# Patient Record
Sex: MALE | Race: WHITE | NOT HISPANIC OR LATINO | Employment: UNEMPLOYED | URBAN - NONMETROPOLITAN AREA
[De-identification: names, ages, dates, MRNs, and addresses within clinical notes are randomized per-mention and may not be internally consistent; named-entity substitution may affect disease eponyms.]

---

## 2020-03-16 ENCOUNTER — HISTORICAL (OUTPATIENT)
Dept: ADMINISTRATIVE | Facility: HOSPITAL | Age: 60
End: 2020-03-16

## 2020-04-21 ENCOUNTER — HISTORICAL (OUTPATIENT)
Dept: CARDIOLOGY | Facility: HOSPITAL | Age: 60
End: 2020-04-21

## 2021-01-20 ENCOUNTER — HISTORICAL (OUTPATIENT)
Dept: ADMINISTRATIVE | Facility: HOSPITAL | Age: 61
End: 2021-01-20

## 2021-03-04 ENCOUNTER — HISTORICAL (OUTPATIENT)
Dept: ADMINISTRATIVE | Facility: HOSPITAL | Age: 61
End: 2021-03-04

## 2021-03-15 ENCOUNTER — HISTORICAL (OUTPATIENT)
Dept: ADMINISTRATIVE | Facility: HOSPITAL | Age: 61
End: 2021-03-15

## 2022-01-02 ENCOUNTER — HISTORICAL (OUTPATIENT)
Dept: ADMINISTRATIVE | Facility: HOSPITAL | Age: 62
End: 2022-01-02

## 2022-01-21 ENCOUNTER — TELEPHONE (OUTPATIENT)
Dept: ORTHOPEDICS | Facility: CLINIC | Age: 62
End: 2022-01-21
Payer: MEDICAID

## 2022-01-21 DIAGNOSIS — M16.12 PRIMARY OSTEOARTHRITIS OF LEFT HIP: Primary | ICD-10-CM

## 2022-03-02 ENCOUNTER — TELEPHONE (OUTPATIENT)
Dept: ORTHOPEDICS | Facility: CLINIC | Age: 62
End: 2022-03-02
Payer: MEDICAID

## 2022-03-02 NOTE — TELEPHONE ENCOUNTER
----- Message from Lauren Temple sent at 3/2/2022  8:59 AM CST -----  Type:  Patient Call Back    Who Called: Dereje     What is the reqeust in detail: Pt IS requesting a call back to reschedule is appt to a morning appt, no solution found in the template. Please Advise     Can the clinic reply by [facility]?    Best Call Back [phone]

## 2022-03-08 ENCOUNTER — TELEPHONE (OUTPATIENT)
Dept: ORTHOPEDICS | Facility: CLINIC | Age: 62
End: 2022-03-08
Payer: MEDICAID

## 2022-03-08 NOTE — TELEPHONE ENCOUNTER
----- Message from Meagan Padilla sent at 3/8/2022  4:43 PM CST -----  Regarding: advice  Contact: [phone]  Pt is calling to speak with someone in office needing information about appt. Please contact pt

## 2022-03-08 NOTE — TELEPHONE ENCOUNTER
Spoke with Mr. Aguirre, pt has canceled appointment for now- states he will reach out to his insurance company for a closer facility.

## 2022-06-23 DIAGNOSIS — G56.00 CARPAL TUNNEL SYNDROME, UNSPECIFIED LATERALITY: Primary | ICD-10-CM

## 2022-07-12 ENCOUNTER — OFFICE VISIT (OUTPATIENT)
Dept: ORTHOPEDICS | Facility: CLINIC | Age: 62
End: 2022-07-12
Payer: MEDICAID

## 2022-07-12 ENCOUNTER — TELEPHONE (OUTPATIENT)
Dept: ORTHOPEDICS | Facility: CLINIC | Age: 62
End: 2022-07-12

## 2022-07-12 VITALS — WEIGHT: 257.81 LBS | BODY MASS INDEX: 36.09 KG/M2 | HEIGHT: 71 IN

## 2022-07-12 DIAGNOSIS — M16.12 PRIMARY OSTEOARTHRITIS OF LEFT HIP: Primary | ICD-10-CM

## 2022-07-12 DIAGNOSIS — Z41.9 ELECTIVE SURGERY: ICD-10-CM

## 2022-07-12 DIAGNOSIS — Z96.642 S/P TOTAL LEFT HIP ARTHROPLASTY: Primary | ICD-10-CM

## 2022-07-12 PROCEDURE — 1159F PR MEDICATION LIST DOCUMENTED IN MEDICAL RECORD: ICD-10-PCS | Mod: CPTII,,, | Performed by: ORTHOPAEDIC SURGERY

## 2022-07-12 PROCEDURE — 99213 OFFICE O/P EST LOW 20 MIN: CPT | Mod: PBBFAC,PN | Performed by: ORTHOPAEDIC SURGERY

## 2022-07-12 PROCEDURE — 99999 PR PBB SHADOW E&M-EST. PATIENT-LVL III: CPT | Mod: PBBFAC,,, | Performed by: ORTHOPAEDIC SURGERY

## 2022-07-12 PROCEDURE — 4010F PR ACE/ARB THEARPY RXD/TAKEN: ICD-10-PCS | Mod: CPTII,,, | Performed by: ORTHOPAEDIC SURGERY

## 2022-07-12 PROCEDURE — 1159F MED LIST DOCD IN RCRD: CPT | Mod: CPTII,,, | Performed by: ORTHOPAEDIC SURGERY

## 2022-07-12 PROCEDURE — 4010F ACE/ARB THERAPY RXD/TAKEN: CPT | Mod: CPTII,,, | Performed by: ORTHOPAEDIC SURGERY

## 2022-07-12 PROCEDURE — 99999 PR PBB SHADOW E&M-EST. PATIENT-LVL III: ICD-10-PCS | Mod: PBBFAC,,, | Performed by: ORTHOPAEDIC SURGERY

## 2022-07-12 PROCEDURE — 3008F PR BODY MASS INDEX (BMI) DOCUMENTED: ICD-10-PCS | Mod: CPTII,,, | Performed by: ORTHOPAEDIC SURGERY

## 2022-07-12 PROCEDURE — 3008F BODY MASS INDEX DOCD: CPT | Mod: CPTII,,, | Performed by: ORTHOPAEDIC SURGERY

## 2022-07-12 PROCEDURE — 1160F PR REVIEW ALL MEDS BY PRESCRIBER/CLIN PHARMACIST DOCUMENTED: ICD-10-PCS | Mod: CPTII,,, | Performed by: ORTHOPAEDIC SURGERY

## 2022-07-12 PROCEDURE — 1160F RVW MEDS BY RX/DR IN RCRD: CPT | Mod: CPTII,,, | Performed by: ORTHOPAEDIC SURGERY

## 2022-07-12 PROCEDURE — 99204 OFFICE O/P NEW MOD 45 MIN: CPT | Mod: S$PBB,,, | Performed by: ORTHOPAEDIC SURGERY

## 2022-07-12 PROCEDURE — 99204 PR OFFICE/OUTPT VISIT, NEW, LEVL IV, 45-59 MIN: ICD-10-PCS | Mod: S$PBB,,, | Performed by: ORTHOPAEDIC SURGERY

## 2022-07-12 RX ORDER — EZETIMIBE 10 MG/1
10 TABLET ORAL DAILY
COMMUNITY
Start: 2022-05-11

## 2022-07-12 RX ORDER — METOPROLOL TARTRATE 100 MG/1
100 TABLET ORAL DAILY
COMMUNITY
Start: 2022-06-14

## 2022-07-12 RX ORDER — ROSUVASTATIN CALCIUM 40 MG/1
TABLET, COATED ORAL
COMMUNITY
Start: 2022-05-11

## 2022-07-12 RX ORDER — BENAZEPRIL HYDROCHLORIDE 40 MG/1
40 TABLET ORAL DAILY
COMMUNITY
Start: 2022-06-14

## 2022-07-12 RX ORDER — ASPIRIN 81 MG/1
81 TABLET ORAL DAILY
COMMUNITY

## 2022-07-12 RX ORDER — IBUPROFEN 800 MG/1
800 TABLET ORAL EVERY 8 HOURS PRN
COMMUNITY
Start: 2022-06-29

## 2022-07-12 NOTE — PROGRESS NOTES
Subjective:      Patient ID: Dereje Aguirre is a 62 y.o. male.    Chief Complaint: Pain of the Left Knee, Consult, and Hip Pain (LEFT )    HPI      Dereje Aguirre is seen for evaluation and treatment of hip pain.  They have experienced problems with their left hip over the past several years Pain is located in the groin and  referred to the knee. They have been treated with over the counter analgesics, NSAIDS and activity modification.   Symptoms have recently worsened. Ambulation reportedly has been impaired. Self care ADLs are painful.  Patient has been unable to work due to hip pain    Review of Systems   Constitutional: Negative for fever and weight loss.   HENT: Negative for congestion.    Eyes: Negative for visual disturbance.   Cardiovascular: Negative for chest pain.   Respiratory: Negative for shortness of breath.    Hematologic/Lymphatic: Negative for bleeding problem. Does not bruise/bleed easily.   Skin: Negative for poor wound healing.   Musculoskeletal: Positive for joint pain.   Gastrointestinal: Negative for abdominal pain.   Genitourinary: Negative for dysuria.   Neurological: Negative for seizures.   Psychiatric/Behavioral: Negative for altered mental status.   Allergic/Immunologic: Negative for persistent infections.         Objective:            Ortho/SPM Exam  Left hip    The patient is not in acute distress.   Body habitus is:normal.   Sclerae normal  The patient walks with a limp.   Respiratory distress:  none  The skin over the hip is:intact.   There is:no local tenderness.   Range of motion- Flexion eighty, External rotation 35, internal rotation -10.  Resisted SLR positive.  Pain with rotation positive  Sciatic tension findings negative.  Shortening/lengthening compared to the contralateral side exam deferred.  Pulses DP present, PT present.  Motor normal 5/5 strength in all tested muscle groups.   Sensory normal.    I reviewed the relevant imaging for the patient's condition:  Left hip  films show complete loss of superior joint space with advanced secondary degenerative changes          Assessment:       Encounter Diagnosis   Name Primary?    Primary osteoarthritis of left hip Yes    The condition is radiographically advanced with significant pain and functional impairment.  Nonsurgical measures have not control symptoms      Plan:       Dereje was seen today for consult, hip pain and pain.    Diagnoses and all orders for this visit:    Primary osteoarthritis of left hip    I explained my diagnostic impression and the reasoning behind it in detail, using layman's terms.  Models and/or pictures were used to help in the explanation.     The process of left total hip replacement was explained to the patient.  The nature of the procedure was explained using a model.  The expected perioperative clinical course and period of recovery as applicable to the patient's condition was described.  The risks including death, infection, thromboembolic events, instability, leg length discrepancy, persistent pain/stiffness, fracture around implant and implant failure due to wear or loosening, with possible need for reoperation, were all explained.  The possibility and expectations of continued nonsurgical care were reviewed.  The patient understands and wishes to proceed with the recommended operation.

## 2022-07-15 DIAGNOSIS — M16.12 PRIMARY OSTEOARTHRITIS OF LEFT HIP: Primary | ICD-10-CM

## 2022-07-15 RX ORDER — MUPIROCIN 20 MG/G
OINTMENT TOPICAL
Status: CANCELLED | OUTPATIENT
Start: 2022-07-15

## 2022-07-15 RX ORDER — TRANEXAMIC ACID 100 MG/ML
1000 INJECTION, SOLUTION INTRAVENOUS
Status: CANCELLED | OUTPATIENT
Start: 2022-07-15

## 2022-07-15 RX ORDER — NAPROXEN 500 MG/1
500 TABLET ORAL
Status: CANCELLED | OUTPATIENT
Start: 2022-07-15 | End: 2022-07-15

## 2022-07-15 RX ORDER — ACETAMINOPHEN 500 MG
1000 TABLET ORAL
Status: CANCELLED | OUTPATIENT
Start: 2022-07-15 | End: 2022-07-15

## 2022-07-15 RX ORDER — CEFAZOLIN SODIUM 2 G/50ML
2 SOLUTION INTRAVENOUS
Status: CANCELLED | OUTPATIENT
Start: 2022-07-15

## 2022-07-15 RX ORDER — SODIUM CHLORIDE 0.9 % (FLUSH) 0.9 %
3 SYRINGE (ML) INJECTION EVERY 8 HOURS
Status: CANCELLED | OUTPATIENT
Start: 2022-07-15

## 2022-07-15 RX ORDER — PREGABALIN 75 MG/1
150 CAPSULE ORAL
Status: CANCELLED | OUTPATIENT
Start: 2022-07-15 | End: 2022-07-15

## 2022-09-23 DIAGNOSIS — Z01.818 PREOP TESTING: ICD-10-CM

## 2022-09-23 DIAGNOSIS — M16.12 PRIMARY OSTEOARTHRITIS OF LEFT HIP: Primary | ICD-10-CM

## 2022-09-23 NOTE — PROGRESS NOTES
PATIENT OPTIMIZATION VISIT:     CC: Left hip pain    Dereje Aguirre is a 62 y.o. male here today for a pre-operative visit in preparation for a Left total hip arthroplasty to be performed by  Dr. Leon on 10/17/22.      Dereje Aguirre has a chronic history of Left hip pain.  Pain is worse with activity and weight bearing. Patient has experienced interference of activities of daily living due to increased pain and decreased range of motion. Patient has failed non-operative treatment including NSAIDs, as well as greater than 3 months of activity modification.     he was last seen and treated in the clinic on 7/12/2022. he will be medically optimized by the pre op center. There has been no significant change in medical status since last visit. No fever, chills, malaise, or unexplained weight change.     He plans to stay with his daughter in [place] after the surgery. He lives in [place].     History of HTN, high cholesterol.     PCP: Andrey Vidales MD.     Patient is NOT vaccinated against covid. He thinks he had covid in January of 2022.     History reviewed. No pertinent past medical history.    History reviewed. No pertinent surgical history.    History reviewed. No pertinent family history.    Review of patient's allergies indicates:  No Known Allergies      Current Outpatient Medications:     amLODIPine (NORVASC) 5 MG tablet, Take 5 mg by mouth once daily., Disp: , Rfl:     aspirin (ECOTRIN) 81 MG EC tablet, Take 81 mg by mouth once daily., Disp: , Rfl:     benazepriL (LOTENSIN) 40 MG tablet, Take 40 mg by mouth once daily., Disp: , Rfl:     diphenhydramine HCl (BENADRYL ALLERGY ORAL), Take by mouth., Disp: , Rfl:     ezetimibe (ZETIA) 10 mg tablet, Take 10 mg by mouth once daily., Disp: , Rfl:      mg tablet, Take 800 mg by mouth every 8 (eight) hours as needed., Disp: , Rfl:     metoprolol tartrate (LOPRESSOR) 100 MG tablet, Take 100 mg by mouth., Disp: , Rfl:     rosuvastatin (CRESTOR) 40 MG Tab,  "SMARTSI Tablet(s) By Mouth Every Evening, Disp: , Rfl:     Review of Systems:   Constitutional: no fever or chills  Eyes: no visual changes  ENT: no nasal congestion or sore throat  Respiratory: no cough or shortness of breath  Cardiovascular: no chest pain or palpitations  Gastrointestinal: no nausea or vomiting, tolerating diet  Genitourinary: no hematuria or dysuria  Integument/Breast: no rash or pruritis  Hematologic/Lymphatic: no easy bruising or lymphadenopathy  Musculoskeletal: positive for hip pain  Neurological: no seizures or tremors  Behavioral/Psych: no auditory or visual hallucinations  Endocrine: no heat or cold intolerance    PE:  Ht 5' 11" (1.803 m)   Wt 118.9 kg (262 lb 1.6 oz)   BMI 36.56 kg/m²     Constitutional: Oriented to person, place, and time. Well-developed and well-nourished.   HENT:   Head: Normocephalic and atraumatic.   Cardiovascular: Normal rate and regular rhythm.   Pulmonary/Chest: Effort normal and breath sounds normal.   Abdominal: Soft.   Neurological: He is alert and oriented to person, place, and time.   Skin: Skin is warm and dry. Capillary refill takes less than 2 seconds.   Psychiatric: Normal mood and affect with normal behavior.     Body habitus is:normal.   The patient walks with a limp.     Left hip exam:   The skin over the hip is:intact.   There is:no local tenderness.   Range of motion- Flexion eighty, External rotation 35, internal rotation -10.    Resisted SLR positive.  Pain with rotation positive  Sciatic tension findings negative.  Shortening/lengthening compared to the contralateral side exam deferred.    Motor normal 5/5 strength in all tested muscle groups.   Sensory normal.     Radiographs: Radiographs reveal complete loss of superior joint space with advanced secondary degenerative changes    Diagnosis: osteoarthritis Left hip    Plan: Left total hip arthroplasty on 10/17/22.    Pre-op labs to be done including CBC with diff and CMP.     Patient will be " contacted by [facility] and by anesthesia for pre-op visits. Patient will be screened for covid prior to surgery- he lives 3 [place] away. May need to do rapid test day of surgery. Dr. Leon's staff will call him and let him know.     Patient plans to go home same day of surgery if possible.     Postop DME ordered including RW    Postop HHPT ordered as well.     During today's Patient Optimization Visit all medications and imaging were reviewed. If completed, all consultant notes, EKG, and lab findings were reviewed. Any additional testing or consultts needed for medical clearance were ordered today. Pre, kim, and post operative procedures and expectations discussed.  Discharge planning was discussed and Home Health has been ordered (when applicable).  All questions were answered.      I spent a total of 30 minutes completing this on the day of the visit.This includes face to face time and non-face to face time preparing to see the patient (eg, review of tests), obtaining and/or reviewing separately obtained history, documenting clinical information in the electronic or other health record, independently interpreting results and communicating results to the patient/family/caregiver, or care coordinator.    Patient has 2 week postop scheduled with Dr. Leon on 11/1/22.

## 2022-09-27 ENCOUNTER — OFFICE VISIT (OUTPATIENT)
Dept: ORTHOPEDICS | Facility: CLINIC | Age: 62
End: 2022-09-27
Payer: MEDICAID

## 2022-09-27 VITALS — HEIGHT: 71 IN | WEIGHT: 262.13 LBS | BODY MASS INDEX: 36.7 KG/M2

## 2022-09-27 DIAGNOSIS — M16.12 PRIMARY OSTEOARTHRITIS OF LEFT HIP: Primary | ICD-10-CM

## 2022-09-27 PROCEDURE — 1160F PR REVIEW ALL MEDS BY PRESCRIBER/CLIN PHARMACIST DOCUMENTED: ICD-10-PCS | Mod: CPTII,,, | Performed by: PHYSICIAN ASSISTANT

## 2022-09-27 PROCEDURE — 99214 PR OFFICE/OUTPT VISIT, EST, LEVL IV, 30-39 MIN: ICD-10-PCS | Mod: S$PBB,,, | Performed by: PHYSICIAN ASSISTANT

## 2022-09-27 PROCEDURE — 1159F MED LIST DOCD IN RCRD: CPT | Mod: CPTII,,, | Performed by: PHYSICIAN ASSISTANT

## 2022-09-27 PROCEDURE — 99213 OFFICE O/P EST LOW 20 MIN: CPT | Mod: PBBFAC,PN | Performed by: PHYSICIAN ASSISTANT

## 2022-09-27 PROCEDURE — 1159F PR MEDICATION LIST DOCUMENTED IN MEDICAL RECORD: ICD-10-PCS | Mod: CPTII,,, | Performed by: PHYSICIAN ASSISTANT

## 2022-09-27 PROCEDURE — 99214 OFFICE O/P EST MOD 30 MIN: CPT | Mod: S$PBB,,, | Performed by: PHYSICIAN ASSISTANT

## 2022-09-27 PROCEDURE — 1160F RVW MEDS BY RX/DR IN RCRD: CPT | Mod: CPTII,,, | Performed by: PHYSICIAN ASSISTANT

## 2022-09-27 PROCEDURE — 99999 PR PBB SHADOW E&M-EST. PATIENT-LVL III: CPT | Mod: PBBFAC,,, | Performed by: PHYSICIAN ASSISTANT

## 2022-09-27 PROCEDURE — 3008F BODY MASS INDEX DOCD: CPT | Mod: CPTII,,, | Performed by: PHYSICIAN ASSISTANT

## 2022-09-27 PROCEDURE — 4010F PR ACE/ARB THEARPY RXD/TAKEN: ICD-10-PCS | Mod: CPTII,,, | Performed by: PHYSICIAN ASSISTANT

## 2022-09-27 PROCEDURE — 3008F PR BODY MASS INDEX (BMI) DOCUMENTED: ICD-10-PCS | Mod: CPTII,,, | Performed by: PHYSICIAN ASSISTANT

## 2022-09-27 PROCEDURE — 4010F ACE/ARB THERAPY RXD/TAKEN: CPT | Mod: CPTII,,, | Performed by: PHYSICIAN ASSISTANT

## 2022-09-27 PROCEDURE — 99999 PR PBB SHADOW E&M-EST. PATIENT-LVL III: ICD-10-PCS | Mod: PBBFAC,,, | Performed by: PHYSICIAN ASSISTANT

## 2022-09-27 RX ORDER — AMLODIPINE BESYLATE 5 MG/1
5 TABLET ORAL DAILY
COMMUNITY
Start: 2022-09-12

## 2022-09-27 NOTE — H&P
PATIENT OPTIMIZATION VISIT:     CC: Left hip pain    Dereje Aguirre is a 62 y.o. male here today for a pre-operative visit in preparation for a Left total hip arthroplasty to be performed by  Dr. Leon on 10/17/22.      Dereje Aguirre has a chronic history of Left hip pain.  Pain is worse with activity and weight bearing. Patient has experienced interference of activities of daily living due to increased pain and decreased range of motion. Patient has failed non-operative treatment including NSAIDs, as well as greater than 3 months of activity modification.     he was last seen and treated in the clinic on 7/12/2022. he will be medically optimized by the pre op center. There has been no significant change in medical status since last visit. No fever, chills, malaise, or unexplained weight change.     He plans to stay with his daughter in [place] after the surgery. He lives in [place].     History of HTN, high cholesterol.     PCP: Andrey Vidales MD.     Patient is NOT vaccinated against covid. He thinks he had covid in January of 2022.     History reviewed. No pertinent past medical history.    History reviewed. No pertinent surgical history.    History reviewed. No pertinent family history.    Review of patient's allergies indicates:  No Known Allergies      Current Outpatient Medications:     amLODIPine (NORVASC) 5 MG tablet, Take 5 mg by mouth once daily., Disp: , Rfl:     aspirin (ECOTRIN) 81 MG EC tablet, Take 81 mg by mouth once daily., Disp: , Rfl:     benazepriL (LOTENSIN) 40 MG tablet, Take 40 mg by mouth once daily., Disp: , Rfl:     diphenhydramine HCl (BENADRYL ALLERGY ORAL), Take by mouth., Disp: , Rfl:     ezetimibe (ZETIA) 10 mg tablet, Take 10 mg by mouth once daily., Disp: , Rfl:      mg tablet, Take 800 mg by mouth every 8 (eight) hours as needed., Disp: , Rfl:     metoprolol tartrate (LOPRESSOR) 100 MG tablet, Take 100 mg by mouth., Disp: , Rfl:     rosuvastatin (CRESTOR) 40 MG Tab,  "SMARTSI Tablet(s) By Mouth Every Evening, Disp: , Rfl:     Review of Systems:   Constitutional: no fever or chills  Eyes: no visual changes  ENT: no nasal congestion or sore throat  Respiratory: no cough or shortness of breath  Cardiovascular: no chest pain or palpitations  Gastrointestinal: no nausea or vomiting, tolerating diet  Genitourinary: no hematuria or dysuria  Integument/Breast: no rash or pruritis  Hematologic/Lymphatic: no easy bruising or lymphadenopathy  Musculoskeletal: positive for hip pain  Neurological: no seizures or tremors  Behavioral/Psych: no auditory or visual hallucinations  Endocrine: no heat or cold intolerance    PE:  Ht 5' 11" (1.803 m)   Wt 118.9 kg (262 lb 1.6 oz)   BMI 36.56 kg/m²     Constitutional: Oriented to person, place, and time. Well-developed and well-nourished.   HENT:   Head: Normocephalic and atraumatic.   Cardiovascular: Normal rate and regular rhythm.   Pulmonary/Chest: Effort normal and breath sounds normal.   Abdominal: Soft.   Neurological: He is alert and oriented to person, place, and time.   Skin: Skin is warm and dry. Capillary refill takes less than 2 seconds.   Psychiatric: Normal mood and affect with normal behavior.     Body habitus is:normal.   The patient walks with a limp.     Left hip exam:   The skin over the hip is:intact.   There is:no local tenderness.   Range of motion- Flexion eighty, External rotation 35, internal rotation -10.    Resisted SLR positive.  Pain with rotation positive  Sciatic tension findings negative.  Shortening/lengthening compared to the contralateral side exam deferred.    Motor normal 5/5 strength in all tested muscle groups.   Sensory normal.     Radiographs: Radiographs reveal complete loss of superior joint space with advanced secondary degenerative changes    Diagnosis: osteoarthritis Left hip    Plan: Left total hip arthroplasty on 10/17/22.    Pre-op labs to be done including CBC with diff and CMP.     Patient will be " contacted by [facility] and by anesthesia for pre-op visits. Patient will be screened for covid prior to surgery- he lives 3 hours away. May need to do rapid test day of surgery. Dr. Leon's staff will call him and let him know.     Patient plans to go home same day of surgery if possible.     Postop DME ordered including RW    Postop HHPT ordered as well.     During today's Patient Optimization Visit all medications and imaging were reviewed. If completed, all consultant notes, EKG, and lab findings were reviewed. Any additional testing or consultts needed for medical clearance were ordered today. Pre, kim, and post operative procedures and expectations discussed.  Discharge planning was discussed and Home Health has been ordered (when applicable).  All questions were answered.      I spent a total of 30 minutes completing this on the day of the visit.This includes face to face time and non-face to face time preparing to see the patient (eg, review of tests), obtaining and/or reviewing separately obtained history, documenting clinical information in the electronic or other health record, independently interpreting results and communicating results to the patient/family/caregiver, or care coordinator.    Patient has 2 week postop scheduled with Dr. Leon on 11/1/22.

## 2022-10-10 ENCOUNTER — PATIENT MESSAGE (OUTPATIENT)
Dept: ORTHOPEDICS | Facility: CLINIC | Age: 62
End: 2022-10-10
Payer: MEDICAID

## 2022-10-10 ENCOUNTER — TELEPHONE (OUTPATIENT)
Dept: ORTHOPEDICS | Facility: CLINIC | Age: 62
End: 2022-10-10
Payer: MEDICAID

## 2022-10-10 NOTE — TELEPHONE ENCOUNTER
Anesthesia wants him to get a preop EKG. His surgery is 10/17.     [place].     Please call him and see if he can get this [place]KG done in [place]. Can PCP do it? Let me know if I need to send any orders.     Would prefer he get EKG done this week, but if it absolutely cannot be done then we can do the morning of surgery. There is a change surgery could be cancelled depending on results. Not likely but could happen.     Please let me know.

## 2022-10-10 NOTE — TELEPHONE ENCOUNTER
Spoke with Mr. Oseguera, pt stated he was seen by his cardiologist on 09/12 and did complete an EKG at that office visit. Patient will have cardiologist fax recent EKG over.

## 2022-10-17 PROBLEM — Z96.642 HISTORY OF LEFT HIP REPLACEMENT: Status: ACTIVE | Noted: 2022-10-17

## 2022-10-18 DIAGNOSIS — Z96.642 S/P TOTAL LEFT HIP ARTHROPLASTY: ICD-10-CM

## 2022-10-18 DIAGNOSIS — M16.12 PRIMARY OSTEOARTHRITIS OF LEFT HIP: Primary | ICD-10-CM

## 2022-11-01 ENCOUNTER — OFFICE VISIT (OUTPATIENT)
Dept: ORTHOPEDICS | Facility: CLINIC | Age: 62
End: 2022-11-01
Payer: MEDICAID

## 2022-11-01 VITALS — HEIGHT: 71 IN | WEIGHT: 268.06 LBS | BODY MASS INDEX: 37.53 KG/M2

## 2022-11-01 DIAGNOSIS — M16.12 PRIMARY OSTEOARTHRITIS OF LEFT HIP: Primary | ICD-10-CM

## 2022-11-01 DIAGNOSIS — Z96.642 S/P TOTAL LEFT HIP ARTHROPLASTY: ICD-10-CM

## 2022-11-01 PROCEDURE — 1160F RVW MEDS BY RX/DR IN RCRD: CPT | Mod: CPTII,,, | Performed by: ORTHOPAEDIC SURGERY

## 2022-11-01 PROCEDURE — 1159F PR MEDICATION LIST DOCUMENTED IN MEDICAL RECORD: ICD-10-PCS | Mod: CPTII,,, | Performed by: ORTHOPAEDIC SURGERY

## 2022-11-01 PROCEDURE — 4010F ACE/ARB THERAPY RXD/TAKEN: CPT | Mod: CPTII,,, | Performed by: ORTHOPAEDIC SURGERY

## 2022-11-01 PROCEDURE — 99999 PR PBB SHADOW E&M-EST. PATIENT-LVL III: CPT | Mod: PBBFAC,,, | Performed by: ORTHOPAEDIC SURGERY

## 2022-11-01 PROCEDURE — 99024 POSTOP FOLLOW-UP VISIT: CPT | Mod: ,,, | Performed by: ORTHOPAEDIC SURGERY

## 2022-11-01 PROCEDURE — 99024 PR POST-OP FOLLOW-UP VISIT: ICD-10-PCS | Mod: ,,, | Performed by: ORTHOPAEDIC SURGERY

## 2022-11-01 PROCEDURE — 1159F MED LIST DOCD IN RCRD: CPT | Mod: CPTII,,, | Performed by: ORTHOPAEDIC SURGERY

## 2022-11-01 PROCEDURE — 4010F PR ACE/ARB THEARPY RXD/TAKEN: ICD-10-PCS | Mod: CPTII,,, | Performed by: ORTHOPAEDIC SURGERY

## 2022-11-01 PROCEDURE — 99999 PR PBB SHADOW E&M-EST. PATIENT-LVL III: ICD-10-PCS | Mod: PBBFAC,,, | Performed by: ORTHOPAEDIC SURGERY

## 2022-11-01 PROCEDURE — 99213 OFFICE O/P EST LOW 20 MIN: CPT | Mod: PBBFAC,PN | Performed by: ORTHOPAEDIC SURGERY

## 2022-11-01 PROCEDURE — 1160F PR REVIEW ALL MEDS BY PRESCRIBER/CLIN PHARMACIST DOCUMENTED: ICD-10-PCS | Mod: CPTII,,, | Performed by: ORTHOPAEDIC SURGERY

## 2022-11-01 NOTE — PROGRESS NOTES
"Subjective:      Patient ID: Dereje Aguirre is a 62 y.o. male.    Chief Complaint: Post-op Evaluation (2 wk p/o- left WINSTON )      HPI:  Two weeks postop  The patient is seen for postop follow-up of left  WINSTON.  Pain control has been satisfactory  They feel that they are ambulating easily  Postoperative complaints include:  None at this time      Current Outpatient Medications:     amLODIPine (NORVASC) 5 MG tablet, Take 5 mg by mouth once daily., Disp: , Rfl:     aspirin (ECOTRIN) 81 MG EC tablet, Take 81 mg by mouth once daily., Disp: , Rfl:     benazepriL (LOTENSIN) 40 MG tablet, Take 40 mg by mouth once daily., Disp: , Rfl:     ezetimibe (ZETIA) 10 mg tablet, Take 10 mg by mouth once daily., Disp: , Rfl:     loratadine (CLARITIN) 10 mg tablet, Take 10 mg by mouth once daily., Disp: , Rfl:     metoprolol tartrate (LOPRESSOR) 100 MG tablet, Take 100 mg by mouth once daily., Disp: , Rfl:     rosuvastatin (CRESTOR) 40 MG Tab, SMARTSI Tablet(s) By Mouth Every Evening, Disp: , Rfl:     diphenhydramine HCl (BENADRYL ALLERGY ORAL), Take by mouth., Disp: , Rfl:      mg tablet, Take 800 mg by mouth every 8 (eight) hours as needed., Disp: , Rfl:     oxyCODONE-acetaminophen (PERCOCET) 5-325 mg per tablet, Take 1 tablet by mouth every 6 (six) hours as needed for Pain. (Patient not taking: Reported on 2022), Disp: 45 tablet, Rfl: 0  Review of patient's allergies indicates:  No Known Allergies    Ht 5' 11" (1.803 m)   Wt 121.6 kg (268 lb 1.3 oz)   BMI 37.39 kg/m²     ROS        Objective:    Ortho Exam          Alert, oriented, no acute distress  Sclera-Normal  Respiratory distress-none  Gait:  Minimal limp  Wound:  Healing cleanly  Range of motion:  Painless  Distal perfusion:  Intact  Distal neurologic:  Intact    Imaging:  States radiographs show well-positioned left hip implant without complicating process      Assessment:             1. Primary osteoarthritis of left hip    2. S/P total left hip arthroplasty "        The patient is recovering normally from the procedure.  There is no evidence of complication the surgical site.        Plan:          No follow-ups on file.    I explained my assessment and reviewed the natural history of recovery from the procedure.  Appropriate progression of physical therapy and activity was discussed.    After discussing the usual options the patient will do self-directed rehab for now.    Considering the distance from his home to this facility, we will try to stretch out follow-up going forward

## 2022-12-14 NOTE — PROGRESS NOTES
POSTOP VISIT FOR LEFT WINSTON:   (Edward)    Dereje Aguirre is here today for a 6 week postop check. Patient is s/p left WINSTON by Dr. Leon on 10/17/22.      He is doing a self directed exercise program for his left hip.     He has some intermittent muscular pain in left hip that is tolerable. He is doing very well.     He has triggering of right middle finger x months. Also with constant numbness in thumb, index, and middle fingers on right hand. He is right hand dominant. Triggering worse at night and in morning.       EXAM:   A well developed male in no distress.  Alert and oriented. Breathing in unlabored. Mood and affect are appropriate.     Hip incision is well healed. There is a good, stable range of motion and leg lengths are clinically equal. The extremity is neurovascularly intact. He ambulates without any assistive devices.    Impression: Doing well 6 weeks s/p LEFT WINSTON    Plan:   - Continue to increase activity as tolerated.   - Can wean from walker/cane as able.   - Continue on prn motrin.     F/u with Dr. Leon at 3 months postop and prn.

## 2022-12-16 ENCOUNTER — OFFICE VISIT (OUTPATIENT)
Dept: ORTHOPEDICS | Facility: CLINIC | Age: 62
End: 2022-12-16
Payer: MEDICAID

## 2022-12-16 VITALS — HEIGHT: 71 IN | BODY MASS INDEX: 36.93 KG/M2 | WEIGHT: 263.81 LBS

## 2022-12-16 DIAGNOSIS — Z96.642 S/P TOTAL LEFT HIP ARTHROPLASTY: ICD-10-CM

## 2022-12-16 DIAGNOSIS — M16.12 PRIMARY OSTEOARTHRITIS OF LEFT HIP: Primary | ICD-10-CM

## 2022-12-16 DIAGNOSIS — M65.331 TRIGGER FINGER, RIGHT MIDDLE FINGER: ICD-10-CM

## 2022-12-16 DIAGNOSIS — M25.569 KNEE PAIN, UNSPECIFIED CHRONICITY, UNSPECIFIED LATERALITY: ICD-10-CM

## 2022-12-16 PROCEDURE — 4010F ACE/ARB THERAPY RXD/TAKEN: CPT | Mod: CPTII,,, | Performed by: PHYSICIAN ASSISTANT

## 2022-12-16 PROCEDURE — 20550 NJX 1 TENDON SHEATH/LIGAMENT: CPT | Mod: S$PBB,79,RT, | Performed by: PHYSICIAN ASSISTANT

## 2022-12-16 PROCEDURE — 20550 TENDON SHEATH: ICD-10-PCS | Mod: S$PBB,79,RT, | Performed by: PHYSICIAN ASSISTANT

## 2022-12-16 PROCEDURE — 1160F RVW MEDS BY RX/DR IN RCRD: CPT | Mod: CPTII,,, | Performed by: PHYSICIAN ASSISTANT

## 2022-12-16 PROCEDURE — 4010F PR ACE/ARB THEARPY RXD/TAKEN: ICD-10-PCS | Mod: CPTII,,, | Performed by: PHYSICIAN ASSISTANT

## 2022-12-16 PROCEDURE — 99999 PR PBB SHADOW E&M-EST. PATIENT-LVL IV: CPT | Mod: PBBFAC,,, | Performed by: PHYSICIAN ASSISTANT

## 2022-12-16 PROCEDURE — 99214 OFFICE O/P EST MOD 30 MIN: CPT | Mod: PBBFAC,PN,25 | Performed by: PHYSICIAN ASSISTANT

## 2022-12-16 PROCEDURE — 99024 POSTOP FOLLOW-UP VISIT: CPT | Mod: POP,,, | Performed by: PHYSICIAN ASSISTANT

## 2022-12-16 PROCEDURE — 20550 NJX 1 TENDON SHEATH/LIGAMENT: CPT | Mod: PBBFAC,PN,RT | Performed by: PHYSICIAN ASSISTANT

## 2022-12-16 PROCEDURE — 99024 PR POST-OP FOLLOW-UP VISIT: ICD-10-PCS | Mod: POP,,, | Performed by: PHYSICIAN ASSISTANT

## 2022-12-16 PROCEDURE — 3008F PR BODY MASS INDEX (BMI) DOCUMENTED: ICD-10-PCS | Mod: CPTII,,, | Performed by: PHYSICIAN ASSISTANT

## 2022-12-16 PROCEDURE — 1160F PR REVIEW ALL MEDS BY PRESCRIBER/CLIN PHARMACIST DOCUMENTED: ICD-10-PCS | Mod: CPTII,,, | Performed by: PHYSICIAN ASSISTANT

## 2022-12-16 PROCEDURE — 99214 PR OFFICE/OUTPT VISIT, EST, LEVL IV, 30-39 MIN: ICD-10-PCS | Mod: S$PBB,24,25, | Performed by: PHYSICIAN ASSISTANT

## 2022-12-16 PROCEDURE — 99999 PR PBB SHADOW E&M-EST. PATIENT-LVL IV: ICD-10-PCS | Mod: PBBFAC,,, | Performed by: PHYSICIAN ASSISTANT

## 2022-12-16 PROCEDURE — 3008F BODY MASS INDEX DOCD: CPT | Mod: CPTII,,, | Performed by: PHYSICIAN ASSISTANT

## 2022-12-16 PROCEDURE — 1159F PR MEDICATION LIST DOCUMENTED IN MEDICAL RECORD: ICD-10-PCS | Mod: CPTII,,, | Performed by: PHYSICIAN ASSISTANT

## 2022-12-16 PROCEDURE — 1159F MED LIST DOCD IN RCRD: CPT | Mod: CPTII,,, | Performed by: PHYSICIAN ASSISTANT

## 2022-12-16 PROCEDURE — 99214 OFFICE O/P EST MOD 30 MIN: CPT | Mod: S$PBB,24,25, | Performed by: PHYSICIAN ASSISTANT

## 2022-12-16 RX ORDER — TRIAMCINOLONE ACETONIDE 40 MG/ML
20 INJECTION, SUSPENSION INTRA-ARTICULAR; INTRAMUSCULAR
Status: DISCONTINUED | OUTPATIENT
Start: 2022-12-16 | End: 2022-12-16 | Stop reason: HOSPADM

## 2022-12-16 RX ORDER — TRIAMCINOLONE ACETONIDE 1 MG/G
CREAM TOPICAL
COMMUNITY
Start: 2022-11-18

## 2022-12-16 RX ADMIN — TRIAMCINOLONE ACETONIDE 20 MG: 40 INJECTION, SUSPENSION INTRA-ARTICULAR; INTRAMUSCULAR at 01:12

## 2022-12-16 NOTE — PROCEDURES
Tendon Sheath    Date/Time: 12/16/2022 1:30 PM  Performed by: Charley Ritter PA-C  Authorized by: Charley Ritter PA-C     Consent Done?:  Yes (Verbal)  Timeout: prior to procedure the correct patient, procedure, and site was verified    Local anesthetic:  Lidocaine 1% without epinephrine  Location:  Long finger  Site:  R long flexor tendon sheath  Medications:  20 mg triamcinolone acetonide 40 mg/mL    Additional Comments: PROCEDURE NOTE:  RIGHT MIDDLE TRIGGER FINGER INJECTION    I have explained the risks, benefits, and alternatives of the procedure in detail.  The patient voices understanding and all questions have been answered.  The patient agrees to proceed as planned.    After a sterile prep of the skin using chloraprep one step, the area was sprayed with local topical anesthetic and then cleaned with alcohol. The RIGHT MIDDLE finger flexor tendon is injected using a 25 gauge needle with a combination of 0.5 cc 1% plain xylocaine and 20 mg of triamcinolone.     The patient is cautioned that immediate relief of pain is secondary to the local anesthetic and will be temporary. After the anesthetic wears off there may be a increase in pain that may last for a few hours or a few days and they should use ice to help alleviate this this pain.     If patient is diabetic, post injection elevation of blood sugar was discussed. Patient is to check blood sugar regularly and call PCP with any issues.     Patient tolerated the procedure well.

## 2022-12-16 NOTE — PROGRESS NOTES
Subjective:      Patient ID: Dereje Aguirre is a 62 y.o. male.    Chief Complaint: Post-op Evaluation of the Left Hip (Patient presents today for a post-op visit for his left hip.)      MARJ  (Edward/French)    Dereje Aguirre is here today for a 6 week postop check. Patient is s/p left WINSTON by Dr. Leon on 10/17/22.       He is doing a self directed exercise program for his left hip.      He has some intermittent muscular pain in left hip that is tolerable. He is doing very well.      He has triggering of right middle finger x months. Also with constant numbness in thumb, index, and middle fingers on right hand. He is right hand dominant. Triggering worse at night and in morning. He thinks PCP had EMG done showing carpal tunnel syndrome.        Past Medical History:   Diagnosis Date    High cholesterol     Hypertension     Kidney stones     Sleep apnea     uses CPAP         Current Outpatient Medications:     amLODIPine (NORVASC) 5 MG tablet, Take 5 mg by mouth once daily., Disp: , Rfl:     aspirin (ECOTRIN) 81 MG EC tablet, Take 81 mg by mouth once daily., Disp: , Rfl:     benazepriL (LOTENSIN) 40 MG tablet, Take 40 mg by mouth once daily., Disp: , Rfl:     diphenhydramine HCl (BENADRYL ALLERGY ORAL), Take by mouth., Disp: , Rfl:     ezetimibe (ZETIA) 10 mg tablet, Take 10 mg by mouth once daily., Disp: , Rfl:      mg tablet, Take 800 mg by mouth every 8 (eight) hours as needed., Disp: , Rfl:     loratadine (CLARITIN) 10 mg tablet, Take 10 mg by mouth once daily., Disp: , Rfl:     metoprolol tartrate (LOPRESSOR) 100 MG tablet, Take 100 mg by mouth once daily., Disp: , Rfl:     rosuvastatin (CRESTOR) 40 MG Tab, SMARTSI Tablet(s) By Mouth Every Evening, Disp: , Rfl:     triamcinolone acetonide 0.1% (KENALOG) 0.1 % cream, Apply topically., Disp: , Rfl:     Review of patient's allergies indicates:  No Known Allergies    Review of Systems   Constitutional: Negative for chills, fever, night sweats and weight  "gain.   Gastrointestinal:  Negative for bowel incontinence, nausea and vomiting.   Genitourinary:  Negative for bladder incontinence.   Neurological:  Negative for disturbances in coordination and loss of balance.         Objective:        Ht 5' 11" (1.803 m)   Wt 119.7 kg (263 lb 12.8 oz)   BMI 36.79 kg/m²     Ortho/SPM Exam    EXAM:   A well developed male in no distress.  Alert and oriented. Breathing in unlabored. Mood and affect are appropriate.      Hip incision is well healed. There is a good, stable range of motion and leg lengths are clinically equal. The extremity is neurovascularly intact. He ambulates without any assistive devices.    RIGHT Hand/Wrist Examination:    Observation/Inspection:  Swelling  none    Deformity  none  Discoloration  none     Scars   none    Atrophy  none    HAND/WRIST EXAMINATION:  Finkelstein's Test   Neg  WHAT Test    Neg  Snuff box tenderness   Neg  Hook of Hamate Tenderness  Neg  CMC grind    Neg    Neurovascular Exam:  Digits WWP, brisk CR < 3s throughout  NVI motor/LTS to M/R/U nerves, radial pulse 2+  Tinel's Test - Carpal Tunnel  Neg  Tinel's Test - Cubital Tunnel  Neg  Phalen's Test    positive  Median Nerve Compression Test Positive    He has triggering of right middle finger. Minimal tenderness over A1 pulley.     ROM hand/wrist/elbow full, painless      XRAY INTERPRETATION:   X-rays of right hand dated 12/16/22 are personally reviewed and show CMC joint arthritis and degeneration of DIP joint middle finger.           Assessment:       Encounter Diagnoses   Name Primary?    Primary osteoarthritis of left hip Yes    S/P total left hip arthroplasty     Trigger finger, right middle finger     Knee pain, unspecified chronicity, unspecified laterality           Plan:       Dereje was seen today for post-op evaluation.    Diagnoses and all orders for this visit:    Primary osteoarthritis of left hip    S/P total left hip arthroplasty    Trigger finger, right middle " finger  -     X-Ray Hand 3 view Right; Future  -     Tendon Sheath    Knee pain, unspecified chronicity, unspecified laterality  -     X-ray Knee Ortho Bilateral with Flexion; Future      He is doing well regarding left WINSTON.     New complaint of right middle trigger finger and numbness in right thumb/index/middle finger.     XRs show CMC joint arthritis and degeneration of DIP joint middle finger. He likely has carpal tunnel as well.     Treatment options reviewed with patient along with above right hand xrays. Following plan made:     - Return to regular activities. Okay to return to [place] per Dr. Leon. Given note.   - RIGHT middle trigger finger injection done without complication. See procedure note.   - He will have PCP fax EMG results over to be scanned into chart. Current numbness is tolerable.   - He will get referral for his knees and follow up with Dr. Leon in February. Will get XRs prior to this visit.     Follow up in about 2 months (around 2/14/2023).

## 2022-12-16 NOTE — PATIENT INSTRUCTIONS
It was nice to see you today.     You have some wear and tear at the base of your right thumb and in tip of middle finger. You have a trigger finger and this is likely what is causing your pain.     The injection that I did today should give you some good relief of pain. It is normal to have some increased soreness over the next few days after an injection. Put ice on it and elevate. This will get better.     Have your PCP send us a referral for bilateral knee pain. You can bring with you or fax to [phone].     Please stay in touch and call me if you need anything. You can also send me a message in [facility].     Charlye   [phone]

## 2023-01-25 ENCOUNTER — TELEPHONE (OUTPATIENT)
Dept: ORTHOPEDICS | Facility: CLINIC | Age: 63
End: 2023-01-25
Payer: MEDICAID

## 2023-01-25 NOTE — TELEPHONE ENCOUNTER
----- Message from Omayra Valentino sent at 1/25/2023 11:21 AM CST -----  Contact: pt  Type:  referral status     Who Called:Pt   Would the patient rather a call back or a response via [facility]? Call  Best Call Back Number: [phone]  Additional Information:   Pt calling to check on his referral ( hand and knees) before his appt on 02/14

## 2023-01-25 NOTE — TELEPHONE ENCOUNTER
Spoke with patient. I informed patient that we did not receive a referral as of yet. Patient stated he will bring it to the office the day of his appointment.

## 2023-02-01 ENCOUNTER — HOSPITAL ENCOUNTER (INPATIENT)
Facility: HOSPITAL | Age: 63
LOS: 2 days | Discharge: OTHER INSTITUTION | DRG: 194 | End: 2023-02-03
Attending: FAMILY MEDICINE | Admitting: FAMILY MEDICINE
Payer: MEDICAID

## 2023-02-01 DIAGNOSIS — J18.9 PNEUMONIA: ICD-10-CM

## 2023-02-01 DIAGNOSIS — R06.02 SOB (SHORTNESS OF BREATH): ICD-10-CM

## 2023-02-01 LAB
ABS NEUT CALC (OHS): 6.9 X10(3)/MCL (ref 2.1–9.2)
ALBUMIN SERPL-MCNC: 3.7 G/DL (ref 3.4–5)
ALBUMIN/GLOB SERPL: 1.4 RATIO
ALP SERPL-CCNC: 84 UNIT/L (ref 50–144)
ALT SERPL-CCNC: 51 UNIT/L (ref 1–45)
ANION GAP SERPL CALC-SCNC: 16 MEQ/L (ref 2–13)
AST SERPL-CCNC: 85 UNIT/L (ref 17–59)
BILIRUBIN DIRECT+TOT PNL SERPL-MCNC: 1.4 MG/DL (ref 0–1)
BUN SERPL-MCNC: 64 MG/DL (ref 7–20)
CALCIUM SERPL-MCNC: 8.3 MG/DL (ref 8.4–10.2)
CHLORIDE SERPL-SCNC: 100 MMOL/L (ref 98–110)
CO2 SERPL-SCNC: 20 MMOL/L (ref 21–32)
CREAT SERPL-MCNC: 4.98 MG/DL (ref 0.66–1.25)
CREAT/UREA NIT SERPL: 13 (ref 12–20)
ERYTHROCYTE [DISTWIDTH] IN BLOOD BY AUTOMATED COUNT: 13.2 % (ref 11.6–14.4)
GFR SERPLBLD CREATININE-BSD FMLA CKD-EPI: 12 MLS/MIN/1.73/M2
GLOBULIN SER-MCNC: 2.6 GM/DL (ref 2–3.9)
GLUCOSE SERPL-MCNC: 251 MG/DL (ref 70–115)
HCT VFR BLD AUTO: 39.1 % (ref 36–52)
HGB BLD-MCNC: 13.7 GM/DL (ref 13–18)
IMM GRANULOCYTES # BLD AUTO: 0.02 X10(3)/MCL (ref 0–0.03)
IMM GRANULOCYTES NFR BLD AUTO: 0.3 % (ref 0–0.5)
LACTATE SERPL-SCNC: 1.4 MMOL/L (ref 0.4–2)
LIPASE SERPL-CCNC: 167 U/L (ref 23–300)
LYMPHOCYTES NFR BLD MANUAL: 0.45 X10(3)/MCL
LYMPHOCYTES NFR BLD MANUAL: 6 % (ref 13–40)
MAGNESIUM SERPL-MCNC: 2.7 MG/DL (ref 1.8–2.4)
MCH RBC QN AUTO: 29.7 PG (ref 27–34)
MCV RBC AUTO: 84.6 FL (ref 79–99)
MEAN CELL HEMOGLOBIN CONCENTRATION (OHS) G/DL: 35 G/DL (ref 31–37)
MONOCYTES NFR BLD MANUAL: 0.15 X10(3)/MCL (ref 0.1–1.3)
MONOCYTES NFR BLD MANUAL: 2 % (ref 2–11)
NEUTROPHILS NFR BLD MANUAL: 67 % (ref 47–80)
NEUTS BAND NFR BLD MANUAL: 25 % (ref 0–11)
NRBC BLD AUTO-RTO: 0 % (ref 0–1)
PLATELET # BLD AUTO: 128 X10(3)/MCL (ref 140–371)
PLATELET # BLD EST: ABNORMAL 10*3/UL
PMV BLD AUTO: 10.8 FL (ref 9.4–12.4)
POTASSIUM SERPL-SCNC: 3.1 MMOL/L (ref 3.5–5.1)
PROT SERPL-MCNC: 6.3 GM/DL (ref 6.3–8.2)
RBC # BLD AUTO: 4.62 X10(6)/MCL (ref 4–6)
RBC MORPH BLD: NORMAL
SODIUM SERPL-SCNC: 136 MMOL/L (ref 135–145)
WBC # SPEC AUTO: 7.5 X10(3)/MCL (ref 4–11.5)

## 2023-02-01 PROCEDURE — 63600175 PHARM REV CODE 636 W HCPCS: Performed by: INTERNAL MEDICINE

## 2023-02-01 PROCEDURE — 63600175 PHARM REV CODE 636 W HCPCS: Performed by: FAMILY MEDICINE

## 2023-02-01 PROCEDURE — 83690 ASSAY OF LIPASE: CPT | Performed by: INTERNAL MEDICINE

## 2023-02-01 PROCEDURE — 83735 ASSAY OF MAGNESIUM: CPT | Performed by: FAMILY MEDICINE

## 2023-02-01 PROCEDURE — 25000003 PHARM REV CODE 250: Performed by: INTERNAL MEDICINE

## 2023-02-01 PROCEDURE — 87040 BLOOD CULTURE FOR BACTERIA: CPT | Performed by: FAMILY MEDICINE

## 2023-02-01 PROCEDURE — 85025 COMPLETE CBC W/AUTO DIFF WBC: CPT | Performed by: FAMILY MEDICINE

## 2023-02-01 PROCEDURE — 25000003 PHARM REV CODE 250: Performed by: FAMILY MEDICINE

## 2023-02-01 PROCEDURE — 36415 COLL VENOUS BLD VENIPUNCTURE: CPT | Performed by: FAMILY MEDICINE

## 2023-02-01 PROCEDURE — 80053 COMPREHEN METABOLIC PANEL: CPT | Performed by: FAMILY MEDICINE

## 2023-02-01 PROCEDURE — 85027 COMPLETE CBC AUTOMATED: CPT | Performed by: FAMILY MEDICINE

## 2023-02-01 PROCEDURE — 83605 ASSAY OF LACTIC ACID: CPT | Performed by: FAMILY MEDICINE

## 2023-02-01 PROCEDURE — 11000001 HC ACUTE MED/SURG PRIVATE ROOM

## 2023-02-01 RX ORDER — ONDANSETRON 2 MG/ML
4 INJECTION INTRAMUSCULAR; INTRAVENOUS EVERY 6 HOURS PRN
Status: DISCONTINUED | OUTPATIENT
Start: 2023-02-01 | End: 2023-02-01

## 2023-02-01 RX ORDER — HYDROXYZINE HYDROCHLORIDE 25 MG/1
50 TABLET, FILM COATED ORAL ONCE
Status: COMPLETED | OUTPATIENT
Start: 2023-02-01 | End: 2023-02-01

## 2023-02-01 RX ORDER — ONDANSETRON 2 MG/ML
4 INJECTION INTRAMUSCULAR; INTRAVENOUS EVERY 8 HOURS PRN
Status: DISCONTINUED | OUTPATIENT
Start: 2023-02-01 | End: 2023-02-03 | Stop reason: HOSPADM

## 2023-02-01 RX ORDER — ACETAMINOPHEN 500 MG
1000 TABLET ORAL EVERY 6 HOURS PRN
Status: DISCONTINUED | OUTPATIENT
Start: 2023-02-01 | End: 2023-02-03 | Stop reason: HOSPADM

## 2023-02-01 RX ORDER — TIZANIDINE 2 MG/1
2 TABLET ORAL EVERY 8 HOURS PRN
Status: DISCONTINUED | OUTPATIENT
Start: 2023-02-01 | End: 2023-02-03 | Stop reason: HOSPADM

## 2023-02-01 RX ORDER — ATORVASTATIN CALCIUM 40 MG/1
40 TABLET, FILM COATED ORAL NIGHTLY
Status: DISCONTINUED | OUTPATIENT
Start: 2023-02-01 | End: 2023-02-03

## 2023-02-01 RX ORDER — METOPROLOL TARTRATE 25 MG/1
25 TABLET, FILM COATED ORAL 2 TIMES DAILY
Status: DISCONTINUED | OUTPATIENT
Start: 2023-02-01 | End: 2023-02-03 | Stop reason: HOSPADM

## 2023-02-01 RX ORDER — ACETAMINOPHEN 500 MG
1000 TABLET ORAL EVERY 4 HOURS PRN
Status: DISCONTINUED | OUTPATIENT
Start: 2023-02-01 | End: 2023-02-01

## 2023-02-01 RX ORDER — ENOXAPARIN SODIUM 100 MG/ML
40 INJECTION SUBCUTANEOUS EVERY 24 HOURS
Status: DISCONTINUED | OUTPATIENT
Start: 2023-02-01 | End: 2023-02-01

## 2023-02-01 RX ORDER — TALC
6 POWDER (GRAM) TOPICAL NIGHTLY PRN
Status: DISCONTINUED | OUTPATIENT
Start: 2023-02-01 | End: 2023-02-03 | Stop reason: HOSPADM

## 2023-02-01 RX ADMIN — MELATONIN TAB 3 MG 6 MG: 3 TAB at 09:02

## 2023-02-01 RX ADMIN — ENOXAPARIN SODIUM 40 MG: 40 INJECTION SUBCUTANEOUS at 07:02

## 2023-02-01 RX ADMIN — AZITHROMYCIN 500 MG: 500 INJECTION, POWDER, LYOPHILIZED, FOR SOLUTION INTRAVENOUS at 07:02

## 2023-02-01 RX ADMIN — ATORVASTATIN CALCIUM 40 MG: 40 TABLET, FILM COATED ORAL at 09:02

## 2023-02-01 RX ADMIN — HYDROXYZINE HYDROCHLORIDE 50 MG: 25 TABLET ORAL at 09:02

## 2023-02-01 RX ADMIN — METOPROLOL TARTRATE 25 MG: 25 TABLET, FILM COATED ORAL at 09:02

## 2023-02-01 RX ADMIN — CEFTRIAXONE SODIUM 1 G: 1 INJECTION, POWDER, FOR SOLUTION INTRAMUSCULAR; INTRAVENOUS at 07:02

## 2023-02-01 RX ADMIN — ONDANSETRON 4 MG: 2 INJECTION INTRAMUSCULAR; INTRAVENOUS at 09:02

## 2023-02-02 PROBLEM — N17.9 ACUTE RENAL FAILURE: Status: ACTIVE | Noted: 2023-02-02

## 2023-02-02 PROBLEM — R91.1 LUNG NODULE: Status: ACTIVE | Noted: 2023-02-02

## 2023-02-02 PROBLEM — I10 HTN (HYPERTENSION): Status: ACTIVE | Noted: 2023-02-02

## 2023-02-02 PROBLEM — E87.6 HYPOKALEMIA: Status: ACTIVE | Noted: 2023-02-02

## 2023-02-02 PROBLEM — R74.01 TRANSAMINITIS: Status: ACTIVE | Noted: 2023-02-02

## 2023-02-02 PROBLEM — E78.5 HYPERLIPIDEMIA: Status: ACTIVE | Noted: 2023-02-02

## 2023-02-02 PROBLEM — J10.1 INFLUENZA A: Status: ACTIVE | Noted: 2023-02-02

## 2023-02-02 LAB
ANION GAP SERPL CALC-SCNC: 16 MEQ/L (ref 2–13)
AORTIC VALVE CUSP SEPERATION: 1.92 CM
ASCENDING AORTA: 2.7 CM
AV INDEX (PROSTH): 0.84
AV MEAN GRADIENT: 2 MMHG
AV PEAK GRADIENT: 4 MMHG
AV VALVE AREA: 2.82 CM2
AV VELOCITY RATIO: 0.84
BASOPHILS # BLD AUTO: 0.01 X10(3)/MCL (ref 0.01–0.08)
BASOPHILS NFR BLD AUTO: 0.1 % (ref 0.1–1.2)
BSA FOR ECHO PROCEDURE: 2.42 M2
BUN SERPL-MCNC: 72 MG/DL (ref 7–20)
CALCIUM SERPL-MCNC: 8 MG/DL (ref 8.4–10.2)
CHLORIDE SERPL-SCNC: 101 MMOL/L (ref 98–110)
CO2 SERPL-SCNC: 19 MMOL/L (ref 21–32)
CREAT SERPL-MCNC: 5.32 MG/DL (ref 0.66–1.25)
CREAT UR-MCNC: 221.6 MG/DL
CREAT/UREA NIT SERPL: 14 (ref 12–20)
CV ECHO LV RWT: 0.4 CM
DOP CALC AO PEAK VEL: 1.04 M/S
DOP CALC AO VTI: 17.9 CM
DOP CALC LVOT AREA: 3.4 CM2
DOP CALC LVOT DIAMETER: 2.07 CM
DOP CALC LVOT PEAK VEL: 0.87 M/S
DOP CALC LVOT STROKE VOLUME: 50.45 CM3
DOP CALCLVOT PEAK VEL VTI: 15 CM
E WAVE DECELERATION TIME: 172 MSEC
E/A RATIO: 0.91
E/E' RATIO: 4.36 M/S
ECHO LV POSTERIOR WALL: 1.09 CM (ref 0.6–1.1)
EJECTION FRACTION: 60 %
EOSINOPHIL # BLD AUTO: 0 X10(3)/MCL (ref 0.04–0.54)
EOSINOPHIL NFR BLD AUTO: 0 % (ref 0.7–7)
ERYTHROCYTE [DISTWIDTH] IN BLOOD BY AUTOMATED COUNT: 13.2 % (ref 11.6–14.4)
FRACTIONAL SHORTENING: 33 % (ref 28–44)
GFR SERPLBLD CREATININE-BSD FMLA CKD-EPI: 11 MLS/MIN/1.73/M2
GLUCOSE SERPL-MCNC: 233 MG/DL (ref 70–115)
HCT VFR BLD AUTO: 39.1 % (ref 36–52)
HGB BLD-MCNC: 13.7 GM/DL (ref 13–18)
IMM GRANULOCYTES # BLD AUTO: 0.02 X10(3)/MCL (ref 0–0.03)
IMM GRANULOCYTES NFR BLD AUTO: 0.3 % (ref 0–0.5)
INFLUENZA A (OHS): POSITIVE
INFLUENZA B (OHS): NEGATIVE
INTERVENTRICULAR SEPTUM: 1.13 CM (ref 0.6–1.1)
IVC DIAMETER: 1.33 CM
LEFT ATRIUM SIZE: 4.02 CM
LEFT ATRIUM VOLUME INDEX MOD: 21.9 ML/M2
LEFT ATRIUM VOLUME MOD: 51.4 CM3
LEFT INTERNAL DIMENSION IN SYSTOLE: 3.59 CM (ref 2.1–4)
LEFT VENTRICLE DIASTOLIC VOLUME INDEX: 59.87 ML/M2
LEFT VENTRICLE DIASTOLIC VOLUME: 140.7 ML
LEFT VENTRICLE MASS INDEX: 101 G/M2
LEFT VENTRICLE SYSTOLIC VOLUME INDEX: 23 ML/M2
LEFT VENTRICLE SYSTOLIC VOLUME: 54.1 ML
LEFT VENTRICULAR INTERNAL DIMENSION IN DIASTOLE: 5.39 CM (ref 3.5–6)
LEFT VENTRICULAR MASS: 236.99 G
LV LATERAL E/E' RATIO: 4 M/S
LV SEPTAL E/E' RATIO: 4.8 M/S
LVOT MG: 1.6 MMHG
LVOT MV: 0.59 CM/S
LYMPHOCYTES # BLD AUTO: 0.63 X10(3)/MCL (ref 1.32–3.57)
LYMPHOCYTES NFR BLD AUTO: 8.6 % (ref 20–55)
MCH RBC QN AUTO: 29.3 PG (ref 27–34)
MCV RBC AUTO: 83.7 FL (ref 79–99)
MEAN CELL HEMOGLOBIN CONCENTRATION (OHS) G/DL: 35 G/DL (ref 31–37)
MONOCYTES # BLD AUTO: 0.44 X10(3)/MCL (ref 0.3–0.82)
MONOCYTES NFR BLD AUTO: 6 % (ref 4.7–12.5)
MV PEAK A VEL: 0.53 M/S
MV PEAK E VEL: 0.48 M/S
NEUTROPHILS # BLD AUTO: 6.2 X10(3)/MCL (ref 1.78–5.38)
NEUTROPHILS NFR BLD AUTO: 85 % (ref 37–73)
NRBC BLD AUTO-RTO: 0 % (ref 0–1)
PLATELET # BLD AUTO: 132 X10(3)/MCL (ref 140–371)
PMV BLD AUTO: 10.5 FL (ref 9.4–12.4)
POTASSIUM SERPL-SCNC: 3.2 MMOL/L (ref 3.5–5.1)
RA PRESSURE: 3 MMHG
RBC # BLD AUTO: 4.67 X10(6)/MCL (ref 4–6)
SODIUM SERPL-SCNC: 136 MMOL/L (ref 135–145)
SODIUM UR-SCNC: 49 MMOL/L (ref 30–90)
TDI LATERAL: 0.12 M/S
TDI SEPTAL: 0.1 M/S
TDI: 0.11 M/S
TRICUSPID ANNULAR PLANE SYSTOLIC EXCURSION: 2.07 CM
WBC # SPEC AUTO: 7.3 X10(3)/MCL (ref 4–11.5)

## 2023-02-02 PROCEDURE — 25500020 PHARM REV CODE 255: Performed by: FAMILY MEDICINE

## 2023-02-02 PROCEDURE — 25000003 PHARM REV CODE 250: Performed by: INTERNAL MEDICINE

## 2023-02-02 PROCEDURE — 80048 BASIC METABOLIC PNL TOTAL CA: CPT | Performed by: FAMILY MEDICINE

## 2023-02-02 PROCEDURE — 25000003 PHARM REV CODE 250: Performed by: FAMILY MEDICINE

## 2023-02-02 PROCEDURE — 94640 AIRWAY INHALATION TREATMENT: CPT

## 2023-02-02 PROCEDURE — 82570 ASSAY OF URINE CREATININE: CPT | Performed by: INTERNAL MEDICINE

## 2023-02-02 PROCEDURE — 84300 ASSAY OF URINE SODIUM: CPT | Performed by: INTERNAL MEDICINE

## 2023-02-02 PROCEDURE — 63600175 PHARM REV CODE 636 W HCPCS: Performed by: FAMILY MEDICINE

## 2023-02-02 PROCEDURE — 27000207 HC ISOLATION

## 2023-02-02 PROCEDURE — 85025 COMPLETE CBC W/AUTO DIFF WBC: CPT | Performed by: FAMILY MEDICINE

## 2023-02-02 PROCEDURE — 11000001 HC ACUTE MED/SURG PRIVATE ROOM

## 2023-02-02 PROCEDURE — 87400 INFLUENZA A/B EACH AG IA: CPT | Performed by: INTERNAL MEDICINE

## 2023-02-02 PROCEDURE — 27000221 HC OXYGEN, UP TO 24 HOURS

## 2023-02-02 PROCEDURE — 36415 COLL VENOUS BLD VENIPUNCTURE: CPT | Performed by: FAMILY MEDICINE

## 2023-02-02 PROCEDURE — 25000242 PHARM REV CODE 250 ALT 637 W/ HCPCS: Performed by: FAMILY MEDICINE

## 2023-02-02 RX ORDER — POTASSIUM CHLORIDE 14.9 MG/ML
20 INJECTION INTRAVENOUS ONCE
Status: COMPLETED | OUTPATIENT
Start: 2023-02-02 | End: 2023-02-02

## 2023-02-02 RX ORDER — HYDROCODONE POLISTIREX AND CHLORPHENIRAMINE POLISTIREX 10; 8 MG/5ML; MG/5ML
5 SUSPENSION, EXTENDED RELEASE ORAL EVERY 12 HOURS PRN
Status: DISCONTINUED | OUTPATIENT
Start: 2023-02-02 | End: 2023-02-03 | Stop reason: HOSPADM

## 2023-02-02 RX ORDER — OSELTAMIVIR PHOSPHATE 75 MG/1
75 CAPSULE ORAL 2 TIMES DAILY
Status: DISCONTINUED | OUTPATIENT
Start: 2023-02-02 | End: 2023-02-03

## 2023-02-02 RX ORDER — BENZONATATE 100 MG/1
100 CAPSULE ORAL 3 TIMES DAILY PRN
Status: DISCONTINUED | OUTPATIENT
Start: 2023-02-02 | End: 2023-02-03 | Stop reason: HOSPADM

## 2023-02-02 RX ORDER — POTASSIUM CHLORIDE 20 MEQ/1
40 TABLET, EXTENDED RELEASE ORAL ONCE
Status: COMPLETED | OUTPATIENT
Start: 2023-02-02 | End: 2023-02-02

## 2023-02-02 RX ORDER — IPRATROPIUM BROMIDE AND ALBUTEROL SULFATE 2.5; .5 MG/3ML; MG/3ML
3 SOLUTION RESPIRATORY (INHALATION) EVERY 6 HOURS
Status: DISCONTINUED | OUTPATIENT
Start: 2023-02-02 | End: 2023-02-03 | Stop reason: HOSPADM

## 2023-02-02 RX ORDER — SODIUM CHLORIDE 9 MG/ML
INJECTION, SOLUTION INTRAVENOUS CONTINUOUS
Status: DISCONTINUED | OUTPATIENT
Start: 2023-02-02 | End: 2023-02-03 | Stop reason: HOSPADM

## 2023-02-02 RX ADMIN — PERFLUTREN 2 ML: 6.52 INJECTION, SUSPENSION INTRAVENOUS at 02:02

## 2023-02-02 RX ADMIN — POTASSIUM CHLORIDE 20 MEQ: 14.9 INJECTION, SOLUTION INTRAVENOUS at 01:02

## 2023-02-02 RX ADMIN — OSELTAMAVIR PHOSPHATE 75 MG: 75 CAPSULE ORAL at 09:02

## 2023-02-02 RX ADMIN — APIXABAN 5 MG: 2.5 TABLET, FILM COATED ORAL at 09:02

## 2023-02-02 RX ADMIN — POTASSIUM CHLORIDE 40 MEQ: 20 TABLET, EXTENDED RELEASE ORAL at 06:02

## 2023-02-02 RX ADMIN — IPRATROPIUM BROMIDE AND ALBUTEROL SULFATE 3 ML: 2.5; .5 SOLUTION RESPIRATORY (INHALATION) at 07:02

## 2023-02-02 RX ADMIN — ATORVASTATIN CALCIUM 40 MG: 40 TABLET, FILM COATED ORAL at 09:02

## 2023-02-02 RX ADMIN — OSELTAMAVIR PHOSPHATE 75 MG: 75 CAPSULE ORAL at 01:02

## 2023-02-02 RX ADMIN — GUAIFENESIN AND DEXTROMETHORPHAN HYDROBROMIDE 1 TABLET: 30; 600 TABLET, EXTENDED RELEASE ORAL at 01:02

## 2023-02-02 RX ADMIN — METOPROLOL TARTRATE 25 MG: 25 TABLET, FILM COATED ORAL at 09:02

## 2023-02-02 RX ADMIN — SODIUM CHLORIDE: 9 INJECTION, SOLUTION INTRAVENOUS at 01:02

## 2023-02-02 RX ADMIN — CEFTRIAXONE SODIUM 1 G: 1 INJECTION, POWDER, FOR SOLUTION INTRAMUSCULAR; INTRAVENOUS at 06:02

## 2023-02-02 RX ADMIN — GUAIFENESIN AND DEXTROMETHORPHAN HYDROBROMIDE 1 TABLET: 30; 600 TABLET, EXTENDED RELEASE ORAL at 09:02

## 2023-02-02 RX ADMIN — IPRATROPIUM BROMIDE AND ALBUTEROL SULFATE 3 ML: 2.5; .5 SOLUTION RESPIRATORY (INHALATION) at 12:02

## 2023-02-02 RX ADMIN — AZITHROMYCIN 500 MG: 500 INJECTION, POWDER, LYOPHILIZED, FOR SOLUTION INTRAVENOUS at 06:02

## 2023-02-02 RX ADMIN — METHYLPREDNISOLONE SODIUM SUCCINATE 40 MG: 40 INJECTION, POWDER, FOR SOLUTION INTRAMUSCULAR; INTRAVENOUS at 01:02

## 2023-02-02 RX ADMIN — SODIUM CHLORIDE: 9 INJECTION, SOLUTION INTRAVENOUS at 08:02

## 2023-02-02 NOTE — PROGRESS NOTES
[facility]/Surg  [facility] Medicine  Progress Note    Patient Name: Dereje Aguirre  MRN: [id number]  Patient Class: IP- Inpatient   Admission Date: 2/1/2023  Length of Stay: 1 days  Attending Physician: Jolene Young MD  Primary Care Provider: Andrey Vidales MD        Subjective:     Principal Problem:Pneumonia        HPI:  Patient is a 62-year-old man with history of hypertension/hyperlipidemia who had presented to his PCP Andrey Vidales MD for issues of worsening shortness of breath and cough. I spoke with Dr. Vidales and pt had been started on Azithromax and Augmentin po and was worsening so he came back in to his office on 02/01 with worsening symtpoms, Dr. Vidales felt he failed outpt management and called for admission to hospital      Patient states that for the past couple of weeks he has been having difficulty with symptoms of shortness of breath and cough.  He had gone to see his primary care physician who had treated him for a pneumonia.    Despite being treated for the symptoms patient continued to have runny nose, sinus congestion and cough.  Initially the cough was considered dry but has become more productive in the past week.  Patient states that due to the persistent cough he developed pleuritic chest pain and upper abdominal pain that gets worse when coughing and taking deep breaths.  Patient states that he had previously not had issues of shortness of breath on exertion but that has since gotten worse to the point where patient has become short of breath on minimal exertion.  Denies any constipation but has been having difficulty with diarrhea, decreased appetite and decreased oral intake.  Patient states that he has been feeling bad a lot, belching and not feeling as well as he did before.      Overview/Hospital Course:  02/02/2023 patient admitted from PCP office on 02/01 with failure of outpatient treatment known influenza A positive worsening shortness of breath requiring  inpatient admission he is on 3 L of oxygen at present satting 91%  Patient BUN creatinine were 64 and 4.9 on admit this a.m. there up to 72 and 5.3 glucose was 233 also has some mild elevation of his liver functions he is no known prior history of any renal insufficiency see chest also showed a left lower lobe nodule patient states he was seen by Dr. Jimenez pulmonologist in [place] for this in the past was told it was fungal was treated with medicine for 6 months and the lesion has been stable since   patient reports shortness of breath this a.m. very easily fatigued denies chest pain at present       Interval History:      Review of Systems   Constitutional:  Positive for appetite change, fatigue and fever.   Respiratory:  Positive for cough, shortness of breath and wheezing.    Cardiovascular:  Negative for chest pain and leg swelling.   Gastrointestinal:  Negative for abdominal distention, abdominal pain, constipation, diarrhea, nausea and vomiting.   Skin:  Negative for color change, pallor, rash and wound.   Neurological:  Negative for tremors, syncope and headaches.   Psychiatric/Behavioral:  Negative for agitation and behavioral problems.      Objective:     Vital Signs (Most Recent):  Temp: 97.9 °F (36.6 °C) (02/02/23 0701)  Pulse: 91 (02/02/23 0701)  Resp: (!) 22 (02/02/23 0701)  BP: 112/69 (02/02/23 0701)  SpO2: (!) 93 % (02/02/23 0701)   Vital Signs (24h Range):  Temp:  [97.5 °F (36.4 °C)-98.6 °F (37 °C)] 97.9 °F (36.6 °C)  Pulse:  [] 91  Resp:  [20-22] 22  SpO2:  [90 %-96 %] 93 %  BP: (110-127)/(68-75) 112/69     Weight: 116.5 kg (256 lb 12.8 oz)  Body mass index is 35.82 kg/m².    Intake/Output Summary (Last 24 hours) at 2/2/2023 1132  Last data filed at 2/2/2023 0529  Gross per 24 hour   Intake 300 ml   Output 150 ml   Net 150 ml      Physical Exam  Constitutional:       General: He is in acute distress.      Appearance: Normal appearance. He is normal weight. He is ill-appearing.   HENT:       Head: Normocephalic and atraumatic.      Right Ear: External ear normal.      Left Ear: External ear normal.      Nose: Nose normal.   Eyes:      General: No scleral icterus.     Conjunctiva/sclera: Conjunctivae normal.   Cardiovascular:      Rate and Rhythm: Normal rate and regular rhythm.      Pulses: Normal pulses.      Heart sounds: Normal heart sounds.   Pulmonary:      Effort: No respiratory distress.      Breath sounds: Wheezing (Scattered wheezes bilaterally) and rhonchi present. No rales.   Abdominal:      General: Abdomen is flat. Bowel sounds are normal. There is no distension.      Palpations: Abdomen is soft.      Tenderness: There is no abdominal tenderness. There is no guarding.   Musculoskeletal:         General: No swelling or tenderness.   Skin:     General: Skin is warm and dry.      Findings: No erythema or rash.   Neurological:      General: No focal deficit present.      Mental Status: He is alert and oriented to person, place, and time.   Psychiatric:         Mood and Affect: Mood normal.         Behavior: Behavior normal.         Thought Content: Thought content normal.         Significant Labs: All pertinent labs within the past 24 hours have been reviewed.  BMP:   Recent Labs   Lab 02/01/23 1848 02/02/23  0430    136   K 3.1* 3.2*   CO2 20* 19*   BUN 64.0* 72.0*   CREATININE 4.98* 5.32*   CALCIUM 8.3* 8.0*   MG 2.70*  --      CBC:   Recent Labs   Lab 02/01/23 1848 02/02/23  0430   WBC 7.5 7.3   HGB 13.7 13.7   HCT 39.1 39.1   * 132*       Significant Imaging: I have reviewed all pertinent imaging results/findings within the past 24 hours.      1. Bibasilar consolidation is noted within both lung bases (more pronounced on the right) and is suspicious for bibasilar pneumonic infiltrates.  2. A 2.5-3 cm, round, noncalcified nodule is noted posteriorly within the left lung base and is suspicious for a neoplastic/metastatic process.  3. A 2.1 cm, hypodense nodule is noted  originating from the left adrenal gland.  This does not appear particularly worrisome and I suspect represents a benign adenoma.  4. Left-sided, nonobstructing nephrolithiasis as described above.  5. The prostate gland is enlarged (5.5-6 cm).  I suspect the changes are related to BPH, however, correlation with the patient's physical examination and PSA is recommended.  6. Chronic changes are present as described above.            Assessment/Plan:      * Pneumonia  Bilateral lower lobe pneumonia  Sirs not septic white count is not elevated he did have a fever and have evidence of acute kidney injury with acute renal failure creatinine is worsened today  We will continue Rocephin and Zithromax  Blood in cultures have been done and pending  I am going to add some Tussionex for cough Mucinex for expectorant Tessalon Perles p.r.n.   We will also add some IV steroids Solu-Medrol 40 mg IV Q 12      Acute renal failure  We will give 1 L fluid bolus with IV fluids suspect renal failure due to dehydration his lisinopril will be held we will recheck labs in a.m.  We do not have Nephrology Services here if he does not have a significant improvement in the next 24-48 hours we may need to transfer him for Nephrology consult this was discussed with the patient      Influenza A   We will add Tamiflu    Transaminitis  Slight elevation of his liver functions we will get repeat levels in a.m.      Hypokalemia  Replace potassium      Lung nodule  Patient has a lung nodule that was there since March of 2021 he is seen pulmonology for this Dr. Jimenez in [place] it was noted on his CT at time of admission patient states that his last follow up with Dr. Jimenez they completed 6 months of some antifungal treatment and is not following up after this      Hyperlipidemia  Continue home med atorvastatin      HTN (hypertension)  Continue his home meds metoprolol        VTE Risk Mitigation (From admission, onward)         Ordered      apixaban tablet 5 mg  2 times daily         02/01/23 2116     IP VTE LOW RISK PATIENT  Once         02/01/23 1745                Discharge Planning   BARBER:      Code Status: Full Code   Is the patient medically ready for discharge?:     Reason for patient still in hospital (select all that apply): Patient trending condition and Treatment  Discharge Plan A: Home                  Jolene Young MD  Department of Hospital Medicine   [facility]/Surg

## 2023-02-02 NOTE — PROGRESS NOTES
Inpatient Nutrition Evaluation    Admit Date: 2/1/2023   Total duration of encounter: 1 day    Nutrition Recommendation/Prescription     Continue Clear Liquid Diet until patient wishes to advance. Once able to advance, ADAT to Low Na.    Add Boost Breeze Bid (500 Kcal, 18 Gm Pro) due to patients desire to stay on CLD for now.     Continue to encourage PO intake and adjust MNT PRN.     Nutrition Assessment     Chart Review    Reason Seen: malnutrition screening tool (MST)    Malnutrition Screening Tool Results   Have you recently lost weight without trying?: Unsure  Have you been eating poorly because of a decreased appetite?: No   MST Score: 2     Diagnosis:  Pneumonia, Acute Renal Failure, Influenza A, Transaminitis, Hypokalemia, Lung Nodule, HLD, HTN.     Nutrition-Related Labs:  CrCl- 15.92, Glucose- 233H, Ca- 8.0L.     Diet Order: Diet clear liquid  Oral Supplement Order: none  Appetite/Oral Intake: poor/% of meals 100%- 1 recorded meal.   Factors Affecting Nutritional Intake: altered gastrointestinal function and decreased appetite  Food/Sabianism/Cultural Preferences: none reported  Food Allergies: no known food allergies       Wound(s):       Comments (2/2): Patient reports possible 15-20# loss in 1 week but reports his weight fluctuated most recently went from 260# to 240# but CBW- 256.3# showing (3.7# / 1.4%) loss in 1 week. Patient reported poor po intake for 3 days now.     02/02/2023 patient admitted from PCP office on 02/01 with failure of outpatient treatment known influenza A positive worsening shortness of breath requiring inpatient admission he is on 3 L of oxygen at present satting 91%  Patient BUN creatinine were 64 and 4.9 on admit this a.m. there up to 72 and 5.3 glucose was 233 also has some mild elevation of his liver functions he is no known prior history of any renal insufficiency see chest also showed a left lower lobe nodule patient states he was seen by Dr. Jimenez pulmonologist  "in [place] for this in the past was told it was fungal was treated with medicine for 6 months and the lesion has been stable since   patient reports shortness of breath this a.m. very easily fatigued denies chest pain at present    Anthropometrics    Height: 5' 11" (180.3 cm)    Last Weight: 116.5 kg (256 lb 12.8 oz) (23) Weight Method: Bed Scale  BMI (Calculated): 35.8  BMI Classification: obese grade II (BMI 35-39.9)        Ideal Body Weight (IBW), Male: 172 lb     % Ideal Body Weight, Male (lb): 149.3 %                 Usual Body Weight (UBW), k.18 kg  % Usual Body Weight: 98.77     Usual Weight Provided By: patient    Wt Readings from Last 3 Encounters:   23 116.5 kg (256 lb 12.8 oz)      Weight Change(s) Since Admission:  Admit Weight: 116.5 kg (256 lb 12.8 oz) (23)      Patient Education    Not applicable.    Monitoring & Evaluation     Dietitian will monitor energy intake, weight, weight change, electrolyte/renal panel, glucose/endocrine profile, and gastrointestinal profile.  Nutrition Risk/Follow-Up: low (follow-up in 5-7 days)  Patients assigned 'low nutrition risk' status do not qualify for a full nutritional assessment but will be monitored and re-evaluated in a 5-7 day time period. Please consult if re-evaluation needed sooner.   "

## 2023-02-02 NOTE — SUBJECTIVE & OBJECTIVE
Interval History:      Review of Systems   Constitutional:  Positive for appetite change, fatigue and fever.   Respiratory:  Positive for cough, shortness of breath and wheezing.    Cardiovascular:  Negative for chest pain and leg swelling.   Gastrointestinal:  Negative for abdominal distention, abdominal pain, constipation, diarrhea, nausea and vomiting.   Skin:  Negative for color change, pallor, rash and wound.   Neurological:  Negative for tremors, syncope and headaches.   Psychiatric/Behavioral:  Negative for agitation and behavioral problems.      Objective:     Vital Signs (Most Recent):  Temp: 97.9 °F (36.6 °C) (02/02/23 0701)  Pulse: 91 (02/02/23 0701)  Resp: (!) 22 (02/02/23 0701)  BP: 112/69 (02/02/23 0701)  SpO2: (!) 93 % (02/02/23 0701)   Vital Signs (24h Range):  Temp:  [97.5 °F (36.4 °C)-98.6 °F (37 °C)] 97.9 °F (36.6 °C)  Pulse:  [] 91  Resp:  [20-22] 22  SpO2:  [90 %-96 %] 93 %  BP: (110-127)/(68-75) 112/69     Weight: 116.5 kg (256 lb 12.8 oz)  Body mass index is 35.82 kg/m².    Intake/Output Summary (Last 24 hours) at 2/2/2023 1132  Last data filed at 2/2/2023 0529  Gross per 24 hour   Intake 300 ml   Output 150 ml   Net 150 ml      Physical Exam  Constitutional:       General: He is in acute distress.      Appearance: Normal appearance. He is normal weight. He is ill-appearing.   HENT:      Head: Normocephalic and atraumatic.      Right Ear: External ear normal.      Left Ear: External ear normal.      Nose: Nose normal.   Eyes:      General: No scleral icterus.     Conjunctiva/sclera: Conjunctivae normal.   Cardiovascular:      Rate and Rhythm: Normal rate and regular rhythm.      Pulses: Normal pulses.      Heart sounds: Normal heart sounds.   Pulmonary:      Effort: No respiratory distress.      Breath sounds: Wheezing (Scattered wheezes bilaterally) and rhonchi present. No rales.   Abdominal:      General: Abdomen is flat. Bowel sounds are normal. There is no distension.       Palpations: Abdomen is soft.      Tenderness: There is no abdominal tenderness. There is no guarding.   Musculoskeletal:         General: No swelling or tenderness.   Skin:     General: Skin is warm and dry.      Findings: No erythema or rash.   Neurological:      General: No focal deficit present.      Mental Status: He is alert and oriented to person, place, and time.   Psychiatric:         Mood and Affect: Mood normal.         Behavior: Behavior normal.         Thought Content: Thought content normal.         Significant Labs: All pertinent labs within the past 24 hours have been reviewed.  BMP:   Recent Labs   Lab 02/01/23 1848 02/02/23  0430    136   K 3.1* 3.2*   CO2 20* 19*   BUN 64.0* 72.0*   CREATININE 4.98* 5.32*   CALCIUM 8.3* 8.0*   MG 2.70*  --      CBC:   Recent Labs   Lab 02/01/23 1848 02/02/23  0430   WBC 7.5 7.3   HGB 13.7 13.7   HCT 39.1 39.1   * 132*       Significant Imaging: I have reviewed all pertinent imaging results/findings within the past 24 hours.      1. Bibasilar consolidation is noted within both lung bases (more pronounced on the right) and is suspicious for bibasilar pneumonic infiltrates.  2. A 2.5-3 cm, round, noncalcified nodule is noted posteriorly within the left lung base and is suspicious for a neoplastic/metastatic process.  3. A 2.1 cm, hypodense nodule is noted originating from the left adrenal gland.  This does not appear particularly worrisome and I suspect represents a benign adenoma.  4. Left-sided, nonobstructing nephrolithiasis as described above.  5. The prostate gland is enlarged (5.5-6 cm).  I suspect the changes are related to BPH, however, correlation with the patient's physical examination and PSA is recommended.  6. Chronic changes are present as described above.

## 2023-02-02 NOTE — H&P
Date of Service: 2/1/2023    Active Problem List:  -Acute bacterial pneumonia  -Bandemia  -Failure of outpatient medical treatment  -Acute kidney injury   -Hypokalemia  -Essential hypertension/hyperlipidemia  -Coronary artery disease  -Chronic anticoagulation      Medical Decision Making:  -Treat the bacterial pneumonia with IV ceftriaxone/azithromycin.  Recheck influenza A/B.  Patient had been exposed to his relative who had been diagnosed and treated for influenza.  Patient's previous renal function back in September 2022 was normal with BUN/creatinine 26/1.13.  - Given patient's abdominal discomfort level of abdominal discomfort previous history of kidney stones and worsening renal function that is present at this time I edema necessary to do CT of the abdomen and pelvis without contrast to look for postobstructive uropathy or any other possibility of an infectious process.    Diet: Regular diet  DVT Prophy: Eliquis  Disposition: Patient likely be hospitalized within 48 hours    This encounter was completed via telemedicine (audio/video) w/ nursing at bedside ot assist w/ clinical exam.  SOC Audio/Visual Equipment is using HIPPA Compliant Web Platform     Participants on Call: Bedside RN, Patient, Physician on Call.     Physician on Call: Mariana Siegel MD  Seen in Emergency Room awaiting bed placement: No  Status of Pt: Observation  Location of Patient: [place]  Location of Physician: [place], [place] MD  Internal Medicine/Hospitalist  2/1/2023     ===========================================================  CC: Shortness of breath and cough  Allergies: No known drug allergies  Code Status: Full code  History obtained From: Patient    History:   Patient is a 62-year-old man with history of hypertension/hyperlipidemia who had arrived at the emergency room for issues of worsening shortness of breath and cough.    Patient states that for the past couple of weeks he has been having difficulty  "with symptoms of shortness of breath and cough.  He had gone to see his primary care physician who had treated him for a pneumonia.  It is unclear which antibiotic he had been placed on.  Despite being treated for the symptoms patient continued to have runny nose, sinus congestion and cough.  Initially the cough was considered dry but has become more productive in the past week.  Patient states that due to the persistent coffees developed pleuritic chest pain and upper abdominal pain that gets worse when coughing and taking deep breaths.  Patient states that he had previously not had issues of shortness of breath on exertion but that has since gotten worse to the point where patient has become short of breath on minimal exertion.  Denies any constipation but has been having difficulty with diarrhea, decreased appetite and decreased oral intake.  Patient states that he has been feeling bad a lot, belching and not feeling as well as he did before.    Initial vitals 98.6°F, 98 bpm, 22 respirations a minute, 112/68 mmHg, 93% on room air.    Review of laboratory studies show white blood cell count of, hemoglobin of 13.7, MCV of 84.6, platelet count of 128.  25% bandemia.  Sodium of 136, potassium 2.1, chloride 100, carbon dioxide 20, BUN/creatinine 64/4.98.  Glucose of 251.  Calcium of 8.3, magnesium of 2.7.  AST/ALT 85/51, total bilirubin of 1.4.  Lipase of 167.  Lactate within normal limits at 1.4.  X-ray of the chest reveals evidence of a right lower lobe pneumonia    Patient was admitted to the hospital for failure of outpatient medical treatment for a bacterial pneumonia.    ================================================  /69   Pulse 93   Temp 98.1 °F (36.7 °C)   Resp 20   Ht 5' 11" (1.803 m)   Wt 116.5 kg (256 lb 12.8 oz)   SpO2 96%   BMI 35.82 kg/m²     Physical Exam: Done via telemedicine with nursing at bedside to assist with clinical exam  Gen: Mild distress, looks uncomfortable  HEENT: NCAT, " EOMI, Moist Mucous Membranes  CV: s1s2 w/ rrr (-) MRC, peripheral pulses intact and symmetrical  Resp: Diminished air movement at the bases of both lungs, scattered end expiratory wheezing.  GI: snt, nd w/ bs  Musc-Skel: MAL,(-) Pedal Edema  Skin:No obvious rash or lesion, Normal Skin Color, Appropriate skin turgor, dry  Neuro: No Acute/New Focal/Gross Neurological Deficits appreciated, No Facial Asymmetry or weakness noted during interview/exam  Psych: A&O x3 w/ appropriate mood and affect    Labs/Imaging/Medications/Vitals/Relevant Records within EMR have personally been reviewed by me    ===========================================    No current facility-administered medications on file prior to encounter.     No current outpatient medications on file prior to encounter.     Past Medical History:   Diagnosis Date    High cholesterol     Hypertension      No past surgical history on file.  Social History     Socioeconomic History    Marital status:      Family History Reviewed and Non-contributory

## 2023-02-02 NOTE — PLAN OF CARE
02/02/23 0931   Discharge Assessment   Assessment Type Discharge Planning Assessment   Confirmed/corrected address, phone number and insurance Yes   Confirmed Demographics Correct on Facesheet   Source of Information patient   When was your last doctors appointment? 01/30/23   Communicated BARBER with patient/caregiver Date not available/Unable to determine   Reason For Admission Pneumonia   People in Home alone   Do you expect to return to your current living situation? Yes   Prior to hospitilization cognitive status: Alert/Oriented   Current cognitive status: Alert/Oriented   Equipment Currently Used at Home CPAP   Readmission within 30 days? No   Patient currently being followed by outpatient case management? No   Do you currently have service(s) that help you manage your care at home? No   Do you take prescription medications? Yes   Do you have prescription coverage? Yes   Coverage [employer]   Do you have any problems affording any of your prescribed medications? No   Is the patient taking medications as prescribed? yes   Who is going to help you get home at discharge? Family or Friend   How do you get to doctors appointments? car, drives self   Are you on dialysis? No   Do you take coumadin? No   Discharge Plan A Home   Discharge Plan B Home Health   DME Needed Upon Discharge  none   Discharge Plan discussed with: Patient   Discharge Barriers Identified None   Physical Activity   On average, how many days per week do you engage in moderate to strenuous exercise (like a brisk walk)? 7 days   On average, how many minutes do you engage in exercise at this level? 20 min   Financial Resource Strain   How hard is it for you to pay for the very basics like food, housing, medical care, and heating? Not hard   Housing Stability   In the last 12 months, was there a time when you were not able to pay the mortgage or rent on time? N   In the last 12 months, how many places have you lived? 1   In the last  12 months, was there a time when you did not have a steady place to sleep or slept in a shelter (including now)? N   Transportation Needs   In the past 12 months, has lack of transportation kept you from medical appointments or from getting medications? no   In the past 12 months, has lack of transportation kept you from meetings, work, or from getting things needed for daily living? No   Food Insecurity   Within the past 12 months, you worried that your food would run out before you got the money to buy more. Never true   Within the past 12 months, the food you bought just didn't last and you didn't have money to get more. Never true   Stress   Do you feel stress - tense, restless, nervous, or anxious, or unable to sleep at night because your mind is troubled all the time - these days? Only a littl   Social Connections   In a typical week, how many times do you talk on the phone with family, friends, or neighbors? More than 3   How often do you get together with friends or relatives? More than 3   How often do you attend Religion or Scientology services? Never   Do you belong to any clubs or organizations such as Religion groups, unions, fraternal or athletic groups, or school groups? No   How often do you attend meetings of the clubs or organizations you belong to? Never   Are you , , , , never , or living with a partner?    Alcohol Use   Q1: How often do you have a drink containing alcohol? Monthly or l   Q2: How many drinks containing alcohol do you have on a typical day when you are drinking? 1 or 2   Q3: How often do you have six or more drinks on one occasion? Never

## 2023-02-02 NOTE — PLAN OF CARE
Problem: Adult Inpatient Plan of Care  Goal: Plan of Care Review  2/2/2023 0348 by Elizabeth La RN  Outcome: Ongoing, Progressing  2/1/2023 2058 by Elizabeth La RN  Outcome: Ongoing, Progressing  Goal: Patient-Specific Goal (Individualized)  2/2/2023 0348 by Elizabeth La RN  Outcome: Ongoing, Progressing  2/1/2023 2058 by Elizabeth La RN  Outcome: Ongoing, Progressing  Goal: Absence of Hospital-Acquired Illness or Injury  2/2/2023 0348 by Elizabeth La RN  Outcome: Ongoing, Progressing  2/1/2023 2058 by Elizabeth La RN  Outcome: Ongoing, Progressing  Goal: Optimal Comfort and Wellbeing  2/2/2023 0348 by Elizabeth La RN  Outcome: Ongoing, Progressing  2/1/2023 2058 by Elizabeth La RN  Outcome: Ongoing, Progressing  Goal: Readiness for Transition of Care  2/2/2023 0348 by Elizabeth La RN  Outcome: Ongoing, Progressing  2/1/2023 2058 by Elizabeth La RN  Outcome: Ongoing, Progressing     Problem: Fluid Imbalance (Pneumonia)  Goal: Fluid Balance  2/2/2023 0348 by Elizabeth La RN  Outcome: Ongoing, Progressing  2/1/2023 2058 by Elizabeth La RN  Outcome: Ongoing, Progressing     Problem: Infection (Pneumonia)  Goal: Resolution of Infection Signs and Symptoms  2/2/2023 0348 by Elizabeth La RN  Outcome: Ongoing, Progressing  2/1/2023 2058 by Elizabeth La RN  Outcome: Ongoing, Progressing     Problem: Respiratory Compromise (Pneumonia)  Goal: Effective Oxygenation and Ventilation  2/2/2023 0348 by Elizabeth La RN  Outcome: Ongoing, Progressing  2/1/2023 2058 by Elizabeth La RN  Outcome: Ongoing, Progressing

## 2023-02-02 NOTE — ASSESSMENT & PLAN NOTE
Bilateral lower lobe pneumonia  Sirs not septic white count is not elevated he did have a fever and have evidence of acute kidney injury with acute renal failure creatinine is worsened today  We will continue Rocephin and Zithromax  Blood in cultures have been done and pending  I am going to add some Tussionex for cough Mucinex for expectorant Tesgideon Barrera p.r.n.   We will also add some IV steroids Solu-Medrol 40 mg IV Q 12

## 2023-02-02 NOTE — HOSPITAL COURSE
02/02/2023 patient admitted from PCP office on 02/01 with failure of outpatient treatment known influenza A positive worsening shortness of breath requiring inpatient admission he is on 3 L of oxygen at present satting 91%  Patient BUN creatinine were 64 and 4.9 on admit this a.m. there up to 72 and 5.3 glucose was 233 also has some mild elevation of his liver functions he is no known prior history of any renal insufficiency see chest also showed a left lower lobe nodule patient states he was seen by Dr. Jimenez pulmonologist in [place] for this in the past was told it was fungal was treated with medicine for 6 months and the lesion has been stable since   patient reports shortness of breath this a.m. very easily fatigued denies chest pain at present  02/03/2023 patient admitted with flu found to have worsening renal function creatinine is up to 7.3 this a.m. I called the transfer center this morning we are working on finding somewhere that has a renal/nephrologist [facility] on diversion we did place a Lee this morning and she got about 800 cc out with placement patient says he really did not have the urge to urinate denies any history of prostate trouble clinically he feels better as far as his breathing and congestion today we will await word from the transfer center regarding transfer we will leave his acute renal failure is likely multifactorial and hope he will respond to continued care without having to go on dialysis but feel he needs an inpatient renal consult at this time due to worsening renal function this was explained to the patient  02/03/2023 hospital course patient was admitted from PCP found to have mild renal insufficiency we gave him ivf with bolus and held nephrotoxic med for the however his renal function has not improved  I spoke to the transfer center this morning and he is going to be transferred to [facility] there other issues include chronic AFib  for which he is on Eliquis he has some hyperglycemia we have ordered him some sliding scale A1c was measured at 7.8 he was not on any meds for diabetes at home prior to admit we have continued his metoprolol he was on an ACE inhibitor prior to admit which has been held also holding his lipitor due to some elevated liver function

## 2023-02-02 NOTE — HPI
Patient is a 62-year-old man with history of hypertension/hyperlipidemia who had presented to his PCP Andrey Vidales MD for issues of worsening shortness of breath and cough. I spoke with Dr. Vidales and pt had been started on Azithromax and Augmentin po and was worsening so he came back in to his office on 02/01 with worsening symtpoms, Dr. Vidales felt he failed outpt management and called for admission to hospital      Patient states that for the past couple of weeks he has been having difficulty with symptoms of shortness of breath and cough.  He had gone to see his primary care physician who had treated him for a pneumonia.    Despite being treated for the symptoms patient continued to have runny nose, sinus congestion and cough.  Initially the cough was considered dry but has become more productive in the past week.  Patient states that due to the persistent cough he developed pleuritic chest pain and upper abdominal pain that gets worse when coughing and taking deep breaths.  Patient states that he had previously not had issues of shortness of breath on exertion but that has since gotten worse to the point where patient has become short of breath on minimal exertion.  Denies any constipation but has been having difficulty with diarrhea, decreased appetite and decreased oral intake.  Patient states that he has been feeling bad a lot, belching and not feeling as well as he did before.

## 2023-02-02 NOTE — ASSESSMENT & PLAN NOTE
Patient has a lung nodule that was there since March of 2021 he is seen pulmonology for this Dr. Jimenez in [place] it was noted on his CT at time of admission patient states that his last follow up with Dr. Jimenez they completed 6 months of some antifungal treatment and is not following up after this

## 2023-02-02 NOTE — ASSESSMENT & PLAN NOTE
We will give 1 L fluid bolus with IV fluids suspect renal failure due to dehydration his lisinopril will be held we will recheck labs in a.m.  We do not have Nephrology Services here if he does not have a significant improvement in the next 24-48 hours we may need to transfer him for Nephrology consult this was discussed with the patient

## 2023-02-03 ENCOUNTER — HOSPITAL ENCOUNTER (INPATIENT)
Facility: HOSPITAL | Age: 63
LOS: 4 days | Discharge: HOME | DRG: 682 | End: 2023-02-07
Attending: FAMILY MEDICINE | Admitting: FAMILY MEDICINE
Payer: MEDICAID

## 2023-02-03 VITALS
HEART RATE: 89 BPM | TEMPERATURE: 98 F | SYSTOLIC BLOOD PRESSURE: 137 MMHG | WEIGHT: 256 LBS | HEIGHT: 71 IN | BODY MASS INDEX: 35.84 KG/M2 | DIASTOLIC BLOOD PRESSURE: 78 MMHG | RESPIRATION RATE: 20 BRPM | OXYGEN SATURATION: 92 %

## 2023-02-03 DIAGNOSIS — J10.1 INFLUENZA A: ICD-10-CM

## 2023-02-03 DIAGNOSIS — R07.9 CHEST PAIN: ICD-10-CM

## 2023-02-03 DIAGNOSIS — E11.9 TYPE 2 DIABETES MELLITUS WITHOUT COMPLICATION, WITHOUT LONG-TERM CURRENT USE OF INSULIN: ICD-10-CM

## 2023-02-03 DIAGNOSIS — N17.9 AKI (ACUTE KIDNEY INJURY): ICD-10-CM

## 2023-02-03 DIAGNOSIS — N17.9 ACUTE RENAL FAILURE, UNSPECIFIED ACUTE RENAL FAILURE TYPE: Primary | ICD-10-CM

## 2023-02-03 PROBLEM — E11.29 CONTROLLED TYPE 2 DIABETES MELLITUS WITH KIDNEY COMPLICATION, WITHOUT LONG-TERM CURRENT USE OF INSULIN: Status: ACTIVE | Noted: 2023-02-03

## 2023-02-03 PROBLEM — I48.20 CHRONIC A-FIB: Status: ACTIVE | Noted: 2023-02-03

## 2023-02-03 LAB
ABS NEUT CALC (OHS): 3.31 X10(3)/MCL (ref 2.1–9.2)
ALBUMIN SERPL-MCNC: 3.1 G/DL (ref 3.4–5)
ALBUMIN/GLOB SERPL: 1.2 RATIO
ALP SERPL-CCNC: 71 UNIT/L (ref 50–144)
ALT SERPL-CCNC: 73 UNIT/L (ref 1–45)
ANION GAP SERPL CALC-SCNC: 16 MEQ/L (ref 2–13)
APPEARANCE UR: CLEAR
AST SERPL-CCNC: 123 UNIT/L (ref 17–59)
BACTERIA #/AREA URNS AUTO: ABNORMAL /HPF
BILIRUB UR QL STRIP.AUTO: NEGATIVE MG/DL
BILIRUBIN DIRECT+TOT PNL SERPL-MCNC: 0.8 MG/DL (ref 0–1)
BUN SERPL-MCNC: 102 MG/DL (ref 7–20)
CALCIUM SERPL-MCNC: 7 MG/DL (ref 8.4–10.2)
CHLORIDE SERPL-SCNC: 101 MMOL/L (ref 98–110)
CO2 SERPL-SCNC: 16 MMOL/L (ref 21–32)
COLOR UR AUTO: YELLOW
CREAT SERPL-MCNC: 7.38 MG/DL (ref 0.66–1.25)
CREAT/UREA NIT SERPL: 14 (ref 12–20)
ERYTHROCYTE [DISTWIDTH] IN BLOOD BY AUTOMATED COUNT: 13.2 % (ref 11.6–14.4)
EST. AVERAGE GLUCOSE BLD GHB EST-MCNC: 177.2 MG/DL (ref 70–115)
GFR SERPLBLD CREATININE-BSD FMLA CKD-EPI: 8 MLS/MIN/1.73/M2
GLOBULIN SER-MCNC: 2.5 GM/DL (ref 2–3.9)
GLUCOSE SERPL-MCNC: 409 MG/DL (ref 70–115)
GLUCOSE UR QL STRIP.AUTO: >=1000 MG/DL
HBA1C MFR BLD: 7.8 % (ref 4–6)
HCT VFR BLD AUTO: 36 % (ref 36–52)
HGB BLD-MCNC: 12.2 GM/DL (ref 13–18)
IMM GRANULOCYTES # BLD AUTO: 0.05 X10(3)/MCL (ref 0–0.03)
IMM GRANULOCYTES NFR BLD AUTO: 1.1 % (ref 0–0.5)
KETONES UR QL STRIP.AUTO: NEGATIVE MG/DL
LEUKOCYTE ESTERASE UR QL STRIP.AUTO: NEGATIVE UNIT/L
LYMPH ABN # BLD MANUAL: 2 %
LYMPHOCYTES NFR BLD MANUAL: 0.72 X10(3)/MCL
LYMPHOCYTES NFR BLD MANUAL: 15 % (ref 13–40)
MCH RBC QN AUTO: 28.9 PG (ref 27–34)
MCV RBC AUTO: 85.3 FL (ref 79–99)
MEAN CELL HEMOGLOBIN CONCENTRATION (OHS) G/DL: 33.9 G/DL (ref 31–37)
MONOCYTES NFR BLD MANUAL: 0.67 X10(3)/MCL (ref 0.1–1.3)
MONOCYTES NFR BLD MANUAL: 14 % (ref 2–11)
NEUTROPHILS NFR BLD MANUAL: 68 % (ref 47–80)
NEUTS BAND NFR BLD MANUAL: 1 % (ref 0–11)
NITRITE UR QL STRIP.AUTO: NEGATIVE
NRBC BLD AUTO-RTO: 0 % (ref 0–1)
PH UR STRIP.AUTO: 5.5 [PH]
PLATELET # BLD AUTO: 134 X10(3)/MCL (ref 140–371)
PLATELET # BLD EST: ABNORMAL 10*3/UL
PMV BLD AUTO: 10.1 FL (ref 9.4–12.4)
POCT GLUCOSE: 477 MG/DL (ref 70–110)
POCT GLUCOSE: >500 MG/DL (ref 70–110)
POTASSIUM SERPL-SCNC: 4.7 MMOL/L (ref 3.5–5.1)
PROT SERPL-MCNC: 5.6 GM/DL (ref 6.3–8.2)
PROT UR QL STRIP.AUTO: 30 MG/DL
RBC # BLD AUTO: 4.22 X10(6)/MCL (ref 4–6)
RBC #/AREA URNS AUTO: ABNORMAL /HPF
RBC UR QL AUTO: ABNORMAL UNIT/L
SARS-COV-2 RDRP RESP QL NAA+PROBE: NEGATIVE
SODIUM SERPL-SCNC: 133 MMOL/L (ref 135–145)
SP GR UR STRIP.AUTO: 1.01
SQUAMOUS #/AREA URNS AUTO: ABNORMAL /HPF
UA DIPSTICK W REFLEX MICRO PNL UR: ABNORMAL
UROBILINOGEN UR STRIP-ACNC: 0.2 MG/DL
WBC # SPEC AUTO: 4.8 X10(3)/MCL (ref 4–11.5)
WBC #/AREA URNS AUTO: ABNORMAL /HPF

## 2023-02-03 PROCEDURE — 25000003 PHARM REV CODE 250: Performed by: INTERNAL MEDICINE

## 2023-02-03 PROCEDURE — 94640 AIRWAY INHALATION TREATMENT: CPT

## 2023-02-03 PROCEDURE — 36415 COLL VENOUS BLD VENIPUNCTURE: CPT | Performed by: FAMILY MEDICINE

## 2023-02-03 PROCEDURE — 81001 URINALYSIS AUTO W/SCOPE: CPT | Performed by: FAMILY MEDICINE

## 2023-02-03 PROCEDURE — 80053 COMPREHEN METABOLIC PANEL: CPT | Performed by: FAMILY MEDICINE

## 2023-02-03 PROCEDURE — 63600175 PHARM REV CODE 636 W HCPCS: Performed by: FAMILY MEDICINE

## 2023-02-03 PROCEDURE — 21400001 HC TELEMETRY ROOM

## 2023-02-03 PROCEDURE — 85025 COMPLETE CBC W/AUTO DIFF WBC: CPT | Performed by: FAMILY MEDICINE

## 2023-02-03 PROCEDURE — 25000003 PHARM REV CODE 250: Performed by: FAMILY MEDICINE

## 2023-02-03 PROCEDURE — 27000221 HC OXYGEN, UP TO 24 HOURS

## 2023-02-03 PROCEDURE — 85027 COMPLETE CBC AUTOMATED: CPT | Performed by: FAMILY MEDICINE

## 2023-02-03 PROCEDURE — 87635 SARS-COV-2 COVID-19 AMP PRB: CPT | Performed by: FAMILY MEDICINE

## 2023-02-03 PROCEDURE — 87088 URINE BACTERIA CULTURE: CPT | Performed by: FAMILY MEDICINE

## 2023-02-03 PROCEDURE — 94761 N-INVAS EAR/PLS OXIMETRY MLT: CPT

## 2023-02-03 PROCEDURE — 25000242 PHARM REV CODE 250 ALT 637 W/ HCPCS: Performed by: FAMILY MEDICINE

## 2023-02-03 PROCEDURE — 83036 HEMOGLOBIN GLYCOSYLATED A1C: CPT | Performed by: FAMILY MEDICINE

## 2023-02-03 RX ORDER — BENZONATATE 100 MG/1
100 CAPSULE ORAL 3 TIMES DAILY PRN
Status: ON HOLD
Start: 2023-02-03 | End: 2023-02-07 | Stop reason: HOSPADM

## 2023-02-03 RX ORDER — TIZANIDINE 2 MG/1
2 TABLET ORAL EVERY 8 HOURS PRN
Status: ON HOLD
Start: 2023-02-03 | End: 2023-02-07 | Stop reason: HOSPADM

## 2023-02-03 RX ORDER — HYDROCODONE POLISTIREX AND CHLORPHENIRAMINE POLISTIREX 10; 8 MG/5ML; MG/5ML
5 SUSPENSION, EXTENDED RELEASE ORAL EVERY 12 HOURS PRN
Refills: 0 | Status: ON HOLD
Start: 2023-02-03 | End: 2023-02-07 | Stop reason: HOSPADM

## 2023-02-03 RX ORDER — TALC
6 POWDER (GRAM) TOPICAL NIGHTLY PRN
Refills: 0 | Status: ON HOLD
Start: 2023-02-03 | End: 2023-02-07 | Stop reason: HOSPADM

## 2023-02-03 RX ORDER — ACETAMINOPHEN 500 MG
1000 TABLET ORAL EVERY 6 HOURS PRN
Refills: 0 | Status: ON HOLD
Start: 2023-02-03 | End: 2023-02-07 | Stop reason: HOSPADM

## 2023-02-03 RX ORDER — SODIUM CHLORIDE 9 MG/ML
125 INJECTION, SOLUTION INTRAVENOUS CONTINUOUS
Status: ON HOLD
Start: 2023-02-03 | End: 2023-02-07 | Stop reason: HOSPADM

## 2023-02-03 RX ORDER — IBUPROFEN 200 MG
24 TABLET ORAL
Status: DISCONTINUED | OUTPATIENT
Start: 2023-02-03 | End: 2023-02-03

## 2023-02-03 RX ORDER — ONDANSETRON 2 MG/ML
4 INJECTION INTRAMUSCULAR; INTRAVENOUS EVERY 8 HOURS PRN
Status: ON HOLD
Start: 2023-02-03 | End: 2023-02-07 | Stop reason: HOSPADM

## 2023-02-03 RX ORDER — INSULIN ASPART 100 [IU]/ML
10 INJECTION, SOLUTION INTRAVENOUS; SUBCUTANEOUS ONCE
Status: COMPLETED | OUTPATIENT
Start: 2023-02-03 | End: 2023-02-03

## 2023-02-03 RX ORDER — GLUCAGON 1 MG
1 KIT INJECTION
Status: DISCONTINUED | OUTPATIENT
Start: 2023-02-03 | End: 2023-02-03

## 2023-02-03 RX ORDER — IPRATROPIUM BROMIDE AND ALBUTEROL SULFATE 2.5; .5 MG/3ML; MG/3ML
3 SOLUTION RESPIRATORY (INHALATION) EVERY 6 HOURS
Qty: 75 ML | Refills: 0 | Status: ON HOLD
Start: 2023-02-03 | End: 2023-02-07 | Stop reason: HOSPADM

## 2023-02-03 RX ORDER — OSELTAMIVIR PHOSPHATE 6 MG/ML
30 FOR SUSPENSION ORAL DAILY
Qty: 25 ML | Refills: 0 | Status: ON HOLD
Start: 2023-02-03 | End: 2023-02-07 | Stop reason: HOSPADM

## 2023-02-03 RX ORDER — INSULIN ASPART 100 [IU]/ML
0-5 INJECTION, SOLUTION INTRAVENOUS; SUBCUTANEOUS
Status: DISCONTINUED | OUTPATIENT
Start: 2023-02-03 | End: 2023-02-03 | Stop reason: HOSPADM

## 2023-02-03 RX ORDER — IBUPROFEN 200 MG
16 TABLET ORAL
Status: DISCONTINUED | OUTPATIENT
Start: 2023-02-03 | End: 2023-02-03

## 2023-02-03 RX ORDER — OSELTAMIVIR PHOSPHATE 6 MG/ML
30 FOR SUSPENSION ORAL DAILY
Status: DISCONTINUED | OUTPATIENT
Start: 2023-02-03 | End: 2023-02-03 | Stop reason: HOSPADM

## 2023-02-03 RX ORDER — GLUCAGON 1 MG
1 KIT INJECTION
Status: DISCONTINUED | OUTPATIENT
Start: 2023-02-03 | End: 2023-02-03 | Stop reason: HOSPADM

## 2023-02-03 RX ORDER — METOPROLOL TARTRATE 25 MG/1
25 TABLET, FILM COATED ORAL 2 TIMES DAILY
Qty: 60 TABLET | Refills: 11
Start: 2023-02-03 | End: 2024-02-03

## 2023-02-03 RX ADMIN — IPRATROPIUM BROMIDE AND ALBUTEROL SULFATE 3 ML: 2.5; .5 SOLUTION RESPIRATORY (INHALATION) at 01:02

## 2023-02-03 RX ADMIN — METOPROLOL TARTRATE 25 MG: 25 TABLET, FILM COATED ORAL at 09:02

## 2023-02-03 RX ADMIN — APIXABAN 5 MG: 2.5 TABLET, FILM COATED ORAL at 09:02

## 2023-02-03 RX ADMIN — OSELTAMIVIR PHOSPHATE 30 MG: 6 FOR SUSPENSION ORAL at 10:02

## 2023-02-03 RX ADMIN — INSULIN ASPART 5 UNITS: 100 INJECTION, SOLUTION INTRAVENOUS; SUBCUTANEOUS at 10:02

## 2023-02-03 RX ADMIN — HYDROCODONE POLISTIREX AND CHLORPHENIRAMINE POLISTIREX 5 ML: 10; 8 SUSPENSION, EXTENDED RELEASE ORAL at 12:02

## 2023-02-03 RX ADMIN — METHYLPREDNISOLONE SODIUM SUCCINATE 40 MG: 40 INJECTION, POWDER, FOR SOLUTION INTRAMUSCULAR; INTRAVENOUS at 12:02

## 2023-02-03 RX ADMIN — GUAIFENESIN AND DEXTROMETHORPHAN HYDROBROMIDE 1 TABLET: 30; 600 TABLET, EXTENDED RELEASE ORAL at 09:02

## 2023-02-03 RX ADMIN — CEFTRIAXONE SODIUM 1 G: 1 INJECTION, POWDER, FOR SOLUTION INTRAMUSCULAR; INTRAVENOUS at 05:02

## 2023-02-03 RX ADMIN — IPRATROPIUM BROMIDE AND ALBUTEROL SULFATE 3 ML: 2.5; .5 SOLUTION RESPIRATORY (INHALATION) at 06:02

## 2023-02-03 RX ADMIN — IPRATROPIUM BROMIDE AND ALBUTEROL SULFATE 3 ML: 2.5; .5 SOLUTION RESPIRATORY (INHALATION) at 07:02

## 2023-02-03 RX ADMIN — INSULIN ASPART 10 UNITS: 100 INJECTION, SOLUTION INTRAVENOUS; SUBCUTANEOUS at 01:02

## 2023-02-03 RX ADMIN — AZITHROMYCIN 500 MG: 500 INJECTION, POWDER, LYOPHILIZED, FOR SOLUTION INTRAVENOUS at 06:02

## 2023-02-03 RX ADMIN — SODIUM CHLORIDE: 9 INJECTION, SOLUTION INTRAVENOUS at 04:02

## 2023-02-03 RX ADMIN — ACETAMINOPHEN 1000 MG: 500 TABLET, FILM COATED ORAL at 10:02

## 2023-02-03 NOTE — ASSESSMENT & PLAN NOTE
Bilateral lower lobe pneumonia  Sirs not septic white count is not elevated he did have a fever and have evidence of acute kidney injury   acute renal failure creatinine is worsened today  We will continue Rocephin and Zithromax  Blood in cultures have been done and neg so far  added Tussionex for cough Mucinex for expectorant Sandeep Barrera p.r.n.   continue IV steroids Solu-Medrol 40 mg IV Q 12

## 2023-02-03 NOTE — PLAN OF CARE
Problem: Adult Inpatient Plan of Care  Goal: Plan of Care Review  Outcome: Ongoing, Progressing  Goal: Patient-Specific Goal (Individualized)  Outcome: Ongoing, Progressing  Goal: Absence of Hospital-Acquired Illness or Injury  Outcome: Ongoing, Progressing  Goal: Optimal Comfort and Wellbeing  Outcome: Ongoing, Progressing  Goal: Readiness for Transition of Care  Outcome: Ongoing, Progressing     Problem: Fluid Imbalance (Pneumonia)  Goal: Fluid Balance  Outcome: Ongoing, Progressing     Problem: Infection (Pneumonia)  Goal: Resolution of Infection Signs and Symptoms  Outcome: Ongoing, Progressing     Problem: Respiratory Compromise (Pneumonia)  Goal: Effective Oxygenation and Ventilation  Outcome: Ongoing, Progressing     Problem: Fluid and Electrolyte Imbalance (Acute Kidney Injury/Impairment)  Goal: Fluid and Electrolyte Balance  Outcome: Ongoing, Progressing     Problem: Oral Intake Inadequate (Acute Kidney Injury/Impairment)  Goal: Optimal Nutrition Intake  Outcome: Ongoing, Progressing     Problem: Renal Function Impairment (Acute Kidney Injury/Impairment)  Goal: Effective Renal Function  Outcome: Ongoing, Progressing     Problem: Infection  Goal: Absence of Infection Signs and Symptoms  Outcome: Ongoing, Progressing

## 2023-02-03 NOTE — PLAN OF CARE
Problem: Adult Inpatient Plan of Care  Goal: Plan of Care Review  Outcome: Ongoing, Progressing  Goal: Patient-Specific Goal (Individualized)  Outcome: Ongoing, Progressing  Goal: Absence of Hospital-Acquired Illness or Injury  Outcome: Ongoing, Progressing  Goal: Optimal Comfort and Wellbeing  Outcome: Ongoing, Progressing  Goal: Readiness for Transition of Care  Outcome: Ongoing, Progressing     Problem: Fluid Imbalance (Pneumonia)  Goal: Fluid Balance  Outcome: Ongoing, Progressing     Problem: Infection (Pneumonia)  Goal: Resolution of Infection Signs and Symptoms  Outcome: Ongoing, Progressing     Problem: Respiratory Compromise (Pneumonia)  Goal: Effective Oxygenation and Ventilation  Outcome: Ongoing, Progressing     Problem: Fluid and Electrolyte Imbalance (Acute Kidney Injury/Impairment)  Goal: Fluid and Electrolyte Balance  Outcome: Ongoing, Progressing     Problem: Oral Intake Inadequate (Acute Kidney Injury/Impairment)  Goal: Optimal Nutrition Intake  Outcome: Ongoing, Progressing     Problem: Renal Function Impairment (Acute Kidney Injury/Impairment)  Goal: Effective Renal Function  Outcome: Ongoing, Progressing     Problem: Infection  Goal: Absence of Infection Signs and Symptoms  Outcome: Ongoing, Progressing     Problem: Diabetes Comorbidity  Goal: Blood Glucose Level Within Targeted Range  Outcome: Ongoing, Progressing

## 2023-02-03 NOTE — ASSESSMENT & PLAN NOTE
Patient with Long standing persistent (>12 months) atrial fibrillation which is controlled currently with Beta Blocker. Patient is currently in sinus rhythm.[id number] Score: The patient doesn't have any registry metric data available. HASBLED Score:  1. Anticoagulation indicated. Anticoagulation done with eliquis.

## 2023-02-03 NOTE — PLAN OF CARE
[facility]    FACILITY TRANSFER ORDERS      Patient Name: Dereje Aguirre  YOB: 1960    PCP: Andrey Vidales MD   PCP Address: [address] [place] [address] [place] [address]  PCP Phone Number: [phone]  PCP Fax: [phone]    Encounter Date: 02/03/2023    Admit to:  [facility]    Vital Signs:  Routine    Diagnoses:   Active Hospital Problems    Diagnosis  POA    *Pneumonia [J18.9]  Yes     Priority: 1 - High    Acute renal failure [N17.9]  Yes     Priority: 2     Influenza A [J10.1]  Yes     Priority: 3     Chronic a-fib [I48.20]  Yes     Priority: 4     Transaminitis [R74.01]  Yes     Priority: 4     Hypokalemia [E87.6]  Yes     Priority: 5     Lung nodule [R91.1]  Yes     Priority: 6     HTN (hypertension) [I10]  Yes     Priority: 6     Hyperlipidemia [E78.5]  Yes     Priority: 7     Controlled type 2 diabetes mellitus with kidney complication, without long-term current use of insulin [E11.29]  Yes      Resolved Hospital Problems   No resolved problems to display.       Allergies:Review of patient's allergies indicates:  No Known Allergies    Diet: diabetic diet: 2000 calorie    Activities: Activity as tolerated    Goals of Care Treatment Preferences:  Code Status: Full Code      Nursing:       Labs: CBC and BMP         CONSULTS:     to evaluate for community resources/long-range planning.    MISCELLANEOUS CARE:  Diabetes Care:   Fingerstick blood sugar AC and HS and Report CBG < 60 or > 350 to physician.    WOUND CARE ORDERS  None    Medications: Review discharge medications with patient and family and provide education.      Current Discharge Medication List        START taking these medications    Details   0.9 % sodium chloride (SODIUM CHLORIDE 0.9%) solution Inject 125 mL/hr into the vein continuous.      acetaminophen (TYLENOL) 500 MG tablet Take 2 tablets (1,000 mg total) by mouth every 6 (six) hours as needed.  Refills: 0      albuterol-ipratropium (DUO-NEB) 2.5  mg-0.5 mg/3 mL nebulizer solution Take 3 mLs by nebulization every 6 (six) hours. Rescue  Qty: 75 mL, Refills: 0      apixaban (ELIQUIS) 5 mg Tab Take 1 tablet (5 mg total) by mouth 2 (two) times daily.      benzonatate (TESSALON) 100 MG capsule Take 1 capsule (100 mg total) by mouth 3 (three) times daily as needed for Cough.      dextromethorphan-guaiFENesin  mg (MUCINEX DM)  mg per 12 hr tablet Take 1 tablet by mouth 2 (two) times daily. for 10 days  Qty: 20 tablet, Refills: 0      dextrose 5 % (D5W) SolP 250 mL with azithromycin 500 mg SolR 500 mg Inject 500 mg into the vein once daily.      dextrose 5 % in water (D5W) 5 % PgBk 50 mL with cefTRIAXone 1 gram SolR 1 g Inject 1 g into the vein every 12 (twelve) hours.      hydrocodone-chlorpheniramine (TUSSIONEX) 10-8 mg/5 mL suspension Take 5 mLs by mouth every 12 (twelve) hours as needed for Cough.  Refills: 0    Comments: Quantity prescribed more than 7 day supply? No      melatonin (MELATIN) 3 mg tablet Take 2 tablets (6 mg total) by mouth nightly as needed for Insomnia.  Refills: 0      methylPREDNISolone sodium succinate (SOLU-MEDROL) 40 mg/mL SolR Inject 40 mg into the vein every 12 (twelve) hours.      metoprolol tartrate (LOPRESSOR) 25 MG tablet Take 1 tablet (25 mg total) by mouth 2 (two) times daily.  Qty: 60 tablet, Refills: 11    Comments: .      ondansetron 4 mg/2 mL Soln Inject 4 mg into the vein every 8 (eight) hours as needed.      oseltamivir (TAMIFLU) 6 mg/mL SusR Take 5 mLs (30 mg total) by mouth once daily. for 5 days  Qty: 25 mL, Refills: 0      tiZANidine (ZANAFLEX) 2 MG tablet Take 1 tablet (2 mg total) by mouth every 8 (eight) hours as needed.                Immunizations Administered as of 2/3/2023       No immunizations on file.                          _________________________________  Jolene Young MD  02/03/2023

## 2023-02-03 NOTE — DISCHARGE SUMMARY
[facility]/Surg  Hospital Medicine  Discharge Summary      Patient Name: Dereje Aguirre  MRN: [id number]  [facility]: [id number]  Patient Class: IP- Inpatient  Admission Date: 2/1/2023  Hospital Length of Stay: 2 days  Discharge Date and Time:  02/03/2023 9:19 AM  Attending Physician: Jolene Young MD   Discharging Provider: Jolene Young MD  Primary Care Provider: Andrey Vidales MD    Primary Care Team: Networked reference to record PCT     HPI:   Patient is a 62-year-old man with history of hypertension/hyperlipidemia who had presented to his PCP Andrey Vidales MD for issues of worsening shortness of breath and cough. I spoke with Dr. Vidales and pt had been started on Azithromax and Augmentin po and was worsening so he came back in to his office on 02/01 with worsening symtpoms, Dr. Vidales felt he failed outpt management and called for admission to hospital      Patient states that for the past couple of weeks he has been having difficulty with symptoms of shortness of breath and cough.  He had gone to see his primary care physician who had treated him for a pneumonia.    Despite being treated for the symptoms patient continued to have runny nose, sinus congestion and cough.  Initially the cough was considered dry but has become more productive in the past week.  Patient states that due to the persistent cough he developed pleuritic chest pain and upper abdominal pain that gets worse when coughing and taking deep breaths.  Patient states that he had previously not had issues of shortness of breath on exertion but that has since gotten worse to the point where patient has become short of breath on minimal exertion.  Denies any constipation but has been having difficulty with diarrhea, decreased appetite and decreased oral intake.  Patient states that he has been feeling bad a lot, belching and not feeling as well as he did before.      * No surgery found *      Hospital Course:   02/02/2023 patient  admitted from PCP office on 02/01 with failure of outpatient treatment known influenza A positive worsening shortness of breath requiring inpatient admission he is on 3 L of oxygen at present satting 91%  Patient BUN creatinine were 64 and 4.9 on admit this a.m. there up to 72 and 5.3 glucose was 233 also has some mild elevation of his liver functions he is no known prior history of any renal insufficiency see chest also showed a left lower lobe nodule patient states he was seen by Dr. Jimenez pulmonologist in [place] for this in the past was told it was fungal was treated with medicine for 6 months and the lesion has been stable since   patient reports shortness of breath this a.m. very easily fatigued denies chest pain at present  02/03/2023 patient admitted with flu found to have worsening renal function creatinine is up to 7.3 this a.m. I called the transfer center this morning we are working on finding somewhere that has a renal/nephrologist [facility] on diversion we did place a Lee this morning and she got about 800 cc out with placement patient says he really did not have the urge to urinate denies any history of prostate trouble clinically he feels better as far as his breathing and congestion today we will await word from the transfer center regarding transfer we will leave his acute renal failure is likely multifactorial and hope he will respond to continued care without having to go on dialysis but feel he needs an inpatient renal consult at this time due to worsening renal function this was explained to the patient  02/03/2023 hospital course patient was admitted from PCP found to have mild renal insufficiency we gave him ivf with bolus and held nephrotoxic med for the however his renal function has not improved  I spoke to the transfer center this morning and he is going to be transferred [facility] other issues include chronic AFib for which he is on  Eliquis he has some hyperglycemia we have ordered him some sliding scale A1c was measured at 7.8 he was not on any meds for diabetes at home prior to admit we have continued his metoprolol he was on an ACE inhibitor prior to admit which has been held also holding his lipitor due to some elevated liver function       Goals of Care Treatment Preferences:  Code Status: Full Code      Consults:     * Pneumonia  Bilateral lower lobe pneumonia  Sirs not septic white count is not elevated he did have a fever and have evidence of acute kidney injury   acute renal failure creatinine is worsened today  We will continue Rocephin and Zithromax  Blood in cultures have been done and neg so far  added Tussionex for cough Mucinex for expectorant Tessalon Bruce p.r.n.   continue IV steroids Solu-Medrol 40 mg IV Q 12   patient be transferred to [place] [facility] for worsening renal function his creatinine is up to 7.3 despite IV fluid rehydration and holding of nephrotoxic meds we placed a Lee this a.m. 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    Significant Diagnostic Studies: Labs:   BMP:   Recent  Labs   Lab 02/01/23  1848 02/02/23  0430 02/03/23  0536    136 133*   K 3.1* 3.2* 4.7   CO2 20* 19* 16*   BUN 64.0* 72.0* 102.0*   CREATININE 4.98* 5.32* 7.38*   CALCIUM 8.3* 8.0* 7.0*   MG 2.70*  --   --        Pending Diagnostic Studies:     Procedure Component Value Units Date/Time    Urinalysis, Reflex to Urine Culture [[id number]]     Order Status: Sent Lab Status: No result     Specimen: Urine          Medications:  Reconciled Home Medications:      Medication List      You have not been prescribed any medications.         Indwelling Lines/Drains at time of discharge:   Lines/Drains/Airways     Drain  Duration                Urethral Catheter 02/03/23 0701 Straight-tip;Non-latex 16 Fr. <1 day                Time spent on the discharge of patient: 45 minutes     Physical Exam  Constitutional:       General: He is not in acute distress.     Appearance: Normal appearance. He is normal weight. He is not ill-appearing.   HENT:      Head: Normocephalic and atraumatic.   Cardiovascular:      Rate and Rhythm: Normal rate and regular rhythm.      Pulses: Normal pulses.      Heart sounds: Normal heart sounds.   Pulmonary:      Effort: Pulmonary effort is normal.      Breath sounds: Wheezing (Few scattered wheezes his overall breathing has improved since admit) present.   Abdominal:      General: Abdomen is flat. Bowel sounds are normal.      Palpations: Abdomen is soft.   Skin:     General: Skin is warm and dry.      Findings: No erythema or rash.   Neurological:      Mental Status: He is alert.     Had a face-to-face encounter with this patient prior to discharge    Jolene Young MD  Department of Hospital Medicine  [facility]/Surg

## 2023-02-03 NOTE — PROGRESS NOTES
[facility]/Surg  [facility] Medicine  Progress Note    Patient Name: Dereje Aguirre  MRN: [id number]  Patient Class: IP- Inpatient   Admission Date: 2/1/2023  Length of Stay: 2 days  Attending Physician: Jolene Young MD  Primary Care Provider: Andrey Vidales MD        Subjective:     Principal Problem:Pneumonia        HPI:  Patient is a 62-year-old man with history of hypertension/hyperlipidemia who had presented to his PCP Andrey Vidales MD for issues of worsening shortness of breath and cough. I spoke with Dr. Vidales and pt had been started on Azithromax and Augmentin po and was worsening so he came back in to his office on 02/01 with worsening symtpoms, Dr. Vidales felt he failed outpt management and called for admission to hospital      Patient states that for the past couple of weeks he has been having difficulty with symptoms of shortness of breath and cough.  He had gone to see his primary care physician who had treated him for a pneumonia.    Despite being treated for the symptoms patient continued to have runny nose, sinus congestion and cough.  Initially the cough was considered dry but has become more productive in the past week.  Patient states that due to the persistent cough he developed pleuritic chest pain and upper abdominal pain that gets worse when coughing and taking deep breaths.  Patient states that he had previously not had issues of shortness of breath on exertion but that has since gotten worse to the point where patient has become short of breath on minimal exertion.  Denies any constipation but has been having difficulty with diarrhea, decreased appetite and decreased oral intake.  Patient states that he has been feeling bad a lot, belching and not feeling as well as he did before.      Overview/Hospital Course:  02/02/2023 patient admitted from PCP office on 02/01 with failure of outpatient treatment known influenza A positive worsening shortness of breath requiring  inpatient admission he is on 3 L of oxygen at present satting 91%  Patient BUN creatinine were 64 and 4.9 on admit this a.m. there up to 72 and 5.3 glucose was 233 also has some mild elevation of his liver functions he is no known prior history of any renal insufficiency see chest also showed a left lower lobe nodule patient states he was seen by Dr. Jimenez pulmonologist in [place] for this in the past was told it was fungal was treated with medicine for 6 months and the lesion has been stable since   patient reports shortness of breath this a.m. very easily fatigued denies chest pain at present  02/03/2023 patient admitted with flu found to have worsening renal function creatinine is up to 7.3 this a.m. I called the transfer center this morning we are working on finding somewhere that has a renal/nephrologist [facility] on diversion we did place a Lee this morning and she got about 800 cc out with placement patient says he really did not have the urge to urinate denies any history of prostate trouble clinically he feels better as far as his breathing and congestion today we will await word from the transfer center regarding transfer we will leave his acute renal failure is likely multifactorial and hope he will respond to continued care without having to go on dialysis but feel he needs an inpatient renal consult at this time due to worsening renal function this was explained to the patient       Interval History:      Review of Systems   Constitutional:  Positive for appetite change (Improving) and fatigue (Improving). Negative for fever.   Respiratory:  Positive for cough (He is feeling better today less coughing) and shortness of breath (Overall improving). Negative for wheezing.    Cardiovascular:  Negative for chest pain and leg swelling.   Gastrointestinal:  Negative for abdominal pain, constipation, diarrhea, nausea and vomiting.   Skin:  Negative for rash and wound.   Neurological:   Negative for tremors, syncope and headaches.   Psychiatric/Behavioral:  Negative for agitation and behavioral problems.      Objective:     Vital Signs (Most Recent):  Temp: 97.8 °F (36.6 °C) (02/03/23 0701)  Pulse: 79 (02/03/23 0701)  Resp: 20 (02/03/23 0701)  BP: 120/73 (02/03/23 0701)  SpO2: 95 % (02/03/23 0701)   Vital Signs (24h Range):  Temp:  [97.8 °F (36.6 °C)-100 °F (37.8 °C)] 97.8 °F (36.6 °C)  Pulse:  [79-95] 79  Resp:  [16-20] 20  SpO2:  [90 %-95 %] 95 %  BP: ()/(55-73) 120/73     Weight: 116.5 kg (256 lb 12.8 oz)  Body mass index is 35.82 kg/m².    Intake/Output Summary (Last 24 hours) at 2/3/2023 0904  Last data filed at 2/3/2023 0500  Gross per 24 hour   Intake 3371 ml   Output --   Net 3371 ml        Physical Exam  Constitutional:       General: He is not in acute distress.     Appearance: Normal appearance. He is normal weight. He is not ill-appearing.      Comments: Is improving today and looks better   HENT:      Head: Normocephalic and atraumatic.      Right Ear: External ear normal.      Left Ear: External ear normal.      Nose: Nose normal.   Eyes:      General: No scleral icterus.     Conjunctiva/sclera: Conjunctivae normal.   Cardiovascular:      Rate and Rhythm: Normal rate and regular rhythm.      Pulses: Normal pulses.      Heart sounds: Normal heart sounds.   Pulmonary:      Effort: No respiratory distress.      Breath sounds: No wheezing (Scattered wheezes bilaterally), rhonchi or rales.   Abdominal:      General: Abdomen is flat. Bowel sounds are normal. There is no distension.      Palpations: Abdomen is soft.      Tenderness: There is no abdominal tenderness. There is no guarding.   Musculoskeletal:         General: No swelling or tenderness.   Skin:     General: Skin is warm and dry.      Findings: No erythema or rash.   Neurological:      General: No focal deficit present.      Mental Status: He is alert and oriented to person, place, and time.   Psychiatric:         Mood and  Affect: Mood normal.         Behavior: Behavior normal.         Significant Labs: All pertinent labs within the past 24 hours have been reviewed.  BMP:   Recent Labs   Lab 02/01/23 1848 02/02/23 0430 02/03/23  0536      < > 133*   K 3.1*   < > 4.7   CO2 20*   < > 16*   BUN 64.0*   < > 102.0*   CREATININE 4.98*   < > 7.38*   CALCIUM 8.3*   < > 7.0*   MG 2.70*  --   --     < > = values in this interval not displayed.       CBC:   Recent Labs   Lab 02/01/23 1848 02/02/23 0430 02/03/23  0536   WBC 7.5 7.3 4.8   HGB 13.7 13.7 12.2*   HCT 39.1 39.1 36.0   * 132* 134*         Significant Imaging: I have reviewed all pertinent imaging results/findings within the past 24 hours.      1. Bibasilar consolidation is noted within both lung bases (more pronounced on the right) and is suspicious for bibasilar pneumonic infiltrates.  2. A 2.5-3 cm, round, noncalcified nodule is noted posteriorly within the left lung base and is suspicious for a neoplastic/metastatic process.  3. A 2.1 cm, hypodense nodule is noted originating from the left adrenal gland.  This does not appear particularly worrisome and I suspect represents a benign adenoma.  4. Left-sided, nonobstructing nephrolithiasis as described above.  5. The prostate gland is enlarged (5.5-6 cm).  I suspect the changes are related to BPH, however, correlation with the patient's physical examination and PSA is recommended.  6. Chronic changes are present as described above.            Assessment/Plan:      * Pneumonia  Bilateral lower lobe pneumonia  Sirs not septic white count is not elevated he did have a fever and have evidence of acute kidney injury   acute renal failure creatinine is worsened today  We will continue Rocephin and Zithromax  Blood in cultures have been done and neg so far  added Tussionex for cough Mucinex for expectorant Sandeep Barrera p.r.n.   continue IV steroids Solu-Medrol 40 mg IV Q 12      Acute renal failure  In despite IV bolus  gave IV fluids overnight renal function is worsening  Discussed with patient we will arrange for transfer to accepting facility with Nephrology         Influenza A  We will add Tamiflu    Chronic a-fib  Patient with Long standing persistent (>12 months) atrial fibrillation which is controlled currently with Beta Blocker. Patient is currently in sinus rhythm.[id number] Score: The patient doesn't have any registry metric data available. HASBLED Score:  1. Anticoagulation indicated. Anticoagulation done with eliquis.        Transaminitis  Slight elevation of his liver functions   Increase this a.m. continue to monitor  Hold Lipitor      Hypokalemia  Replace potassium      HTN (hypertension)  Continue his home meds metoprolol      Lung nodule  Patient has a lung nodule that was there since March of 2021 he is seen pulmonology for this Dr. Jimenez in [place] it was noted on his CT at time of admission patient states that his last follow up with Dr. Jimenez they completed 6 months of some antifungal treatment and is not following up after this      Hyperlipidemia  Hold Lipitor due to elevated liver function        VTE Risk Mitigation (From admission, onward)         Ordered     apixaban tablet 5 mg  2 times daily         02/01/23 2116     IP VTE LOW RISK PATIENT  Once         02/01/23 1745                Discharge Planning   BARBER:      Code Status: Full Code   Is the patient medically ready for discharge?:     Reason for patient still in hospital (select all that apply): Patient trending condition, Treatment, Consult recommendations and Pending disposition  Discharge Plan A: Home                  Jolene Young MD  Department of Hospital Medicine   [facility]/Surg

## 2023-02-03 NOTE — PROGRESS NOTES
Pharmacist Renal Dose Adjustment Note    Dereje Aguirre is a 62 y.o. male being treated with the medication Tamiflu    Patient Data:    Vital Signs (Most Recent):  Temp: 97.8 °F (36.6 °C) (02/03/23 0701)  Pulse: 79 (02/03/23 0701)  Resp: 20 (02/03/23 0701)  BP: 120/73 (02/03/23 0701)  SpO2: 95 % (02/03/23 0701) Vital Signs (72h Range):  Temp:  [97.5 °F (36.4 °C)-100 °F (37.8 °C)]   Pulse:  []   Resp:  [16-22]   BP: ()/(55-75)   SpO2:  [90 %-96 %]      Recent Labs   Lab 02/01/23  1848 02/02/23  0430 02/03/23  0536   CREATININE 4.98* 5.32* 7.38*     Serum creatinine: 7.38 mg/dL (H) 02/03/23 0536  Estimated creatinine clearance: 13.5 mL/min (A)    Medication: Tamilfu dose: 75mg frequency q12h  will be changed to medication:tamiflu dose:30mg frequency:daily    Pharmacist's Name: Talita Aguirre  Pharmacist's Extension: [phone]

## 2023-02-03 NOTE — ASSESSMENT & PLAN NOTE
Bilateral lower lobe pneumonia  Sirs not septic white count is not elevated he did have a fever and have evidence of acute kidney injury   acute renal failure creatinine is worsened today  We will continue Rocephin and Zithromax  Blood in cultures have been done and neg so far  added Tussionex for cough Mucinex for expectorant Tesgideon Barrera p.r.n.   continue IV steroids Solu-Medrol 40 mg IV Q 12   patient be transferred to [place] [facility] facility for worsening renal function his creatinine is up to 7.3 despite IV fluid rehydration and holding of nephrotoxic meds we placed a Lee this a.m. with 800 cc of urine output so he is likely not significantly obstructed reports no history of prostate issues in the past

## 2023-02-03 NOTE — PROVIDER TRANSFER
Outside Transfer Acceptance Note / Regional Referral Center    Upon patient arrival, please contact Hospital Medicine on call.    Referring facility: [facility]   Referring provider: MARNIE TAN  Accepting facility: [facility]  Accepting provider: SHARI MENDEZ  Admitting provider: JEANETTE BARGER  Reason for transfer:  Need Nephrology  Transfer diagnosis: TRENT, Influenza, Pneumonia  Transfer specialty requested: Nephrology  Transfer specialty notified: yes  Transfer level: NUMBER 1-5: 2  Bed type requested: tele  Isolation status: Droplet   Admission class or status: IP- Inpatient      Narrative     62-year-old male with a history of hypertension, coronary artery disease, paroxysmal atrial fibrillation (on Eliquis), lung nodule (previously treated by Pulmonologist in [place] as a fungal infection by report), and hyperlipidemia admitted to [facility] on February 1 with dyspnea and cough.  He was being treated as an outpatient for pneumonia, but symptoms persisted.  He was admitted with pneumonia, influenza, TRENT, and hypokalemia.  Glucose was elevated during his stay, and liver enzymes were also elevated.  He was treated with Rocephin and azithromycin along with Solu-Medrol. Lisinopril was held, and he received IV fluids.  Tamiflu was added.  Potassium was replaced. Despite interventions, renal function has continued to worsen with BUN increased from 64 up to 102 and creatinine increase from 4.98 up to 7.38.  With the addition of Solu-Medrol, glucose has increased.  Serum bicarbonate decreased from 20 down to 16, though anion gap remains about the same at 16.  With the worsening renal function, they are requesting transfer to Hospital Medicine at [facility] in droplet isolation status for Nephrology consultation.  Statin is being held.  I requested a urinalysis.  Also requested intervention on the hyperglycemia.   Referring noted that patient's respiratory status appears improved today, and he is stable in a telemetry bed currently.    February 3: White blood cells 4.8, hemoglobin 12.2, hematocrit 36, platelets 134, sodium 133, potassium 4.7, chloride 101, CO2 16, , creatinine 7.38, glucose 409, , ALT 73, A1C 7.8  -chest x-ray showed overall improvement compared with February 1.  Pulmonary vasculature is congested with increased interstitial lung markings as seen previously.  Previously noted consolidation within the right lower lung field has improved considerably.  No significant pleural effusions noted.    February 2: Influenza A positive, sodium 136, potassium 3.2, chloride 101, CO2 19, BUN 72, creatinine 5.32, glucose 233, urine sodium 49, urine creatinine 221.6  -CT abdomen and pelvis without contrast had bibasilar consolidation noted within both lung bases (more pronounced on the right) and is suspicious for bibasilar pneumonic infiltrates.  2.5-3 cm round noncalcified nodule noted posteriorly within the left lung base suspicious for neoplastic/metastatic process.  2.1 cm hypodense nodule noted originating from the left adrenal gland.  Left-sided nonobstructing nephrolithiasis.  Prostate gland is enlarged.  -echocardiogram with EF 60-65%.  Normal LV diastolic function.  Normal RV size and systolic function    February 1: White blood cells 7.5, hemoglobin 13.7, hematocrit 39.1, platelets 128, magnesium 2.7, lactic acid 1.4, sodium 136, potassium 3.1, chloride 100, CO2 20, BUN 64, creatinine 4.98, glucose 251, AST 85, ALT 51, lipase 167, blood cultures with no growth at 24 hours    Objective     Vitals: Temp: 97.8 °F (36.6 °C) (02/03/23 0701)  Pulse: 79 (02/03/23 0701)  Resp: 20 (02/03/23 0701)  BP: 120/73 (02/03/23 0701)  SpO2: 95 % (02/03/23 0701)  Recent Labs: CBC:   Recent Labs   Lab 02/01/23  1848 02/02/23  0430 02/03/23  0536   WBC 7.5 7.3 4.8   HGB 13.7 13.7 12.2*   HCT 39.1 39.1 36.0   * 132*  134*     CMP:   Recent Labs   Lab 02/01/23  1848 02/02/23  0430 02/03/23  0536    136 133*   K 3.1* 3.2* 4.7   CO2 20* 19* 16*   BUN 64.0* 72.0* 102.0*   CREATININE 4.98* 5.32* 7.38*   CALCIUM 8.3* 8.0* 7.0*   ALBUMIN 3.7  --  3.1*   BILITOT 1.4*  --  0.8   ALKPHOS 84  --  71   AST 85*  --  123*   ALT 51*  --  73*         Instructions    Admit to Hospital Medicine  Droplet isolation      JUAN Dent MD  Hospital Medicine Staff  Cell: [phone]

## 2023-02-03 NOTE — SUBJECTIVE & OBJECTIVE
Interval History:      Review of Systems   Constitutional:  Positive for appetite change (Improving) and fatigue (Improving). Negative for fever.   Respiratory:  Positive for cough (He is feeling better today less coughing) and shortness of breath (Overall improving). Negative for wheezing.    Cardiovascular:  Negative for chest pain and leg swelling.   Gastrointestinal:  Negative for abdominal pain, constipation, diarrhea, nausea and vomiting.   Skin:  Negative for rash and wound.   Neurological:  Negative for tremors, syncope and headaches.   Psychiatric/Behavioral:  Negative for agitation and behavioral problems.      Objective:     Vital Signs (Most Recent):  Temp: 97.8 °F (36.6 °C) (02/03/23 0701)  Pulse: 79 (02/03/23 0701)  Resp: 20 (02/03/23 0701)  BP: 120/73 (02/03/23 0701)  SpO2: 95 % (02/03/23 0701)   Vital Signs (24h Range):  Temp:  [97.8 °F (36.6 °C)-100 °F (37.8 °C)] 97.8 °F (36.6 °C)  Pulse:  [79-95] 79  Resp:  [16-20] 20  SpO2:  [90 %-95 %] 95 %  BP: ()/(55-73) 120/73     Weight: 116.5 kg (256 lb 12.8 oz)  Body mass index is 35.82 kg/m².    Intake/Output Summary (Last 24 hours) at 2/3/2023 0904  Last data filed at 2/3/2023 0500  Gross per 24 hour   Intake 3371 ml   Output --   Net 3371 ml        Physical Exam  Constitutional:       General: He is not in acute distress.     Appearance: Normal appearance. He is normal weight. He is not ill-appearing.      Comments: Is improving today and looks better   HENT:      Head: Normocephalic and atraumatic.      Right Ear: External ear normal.      Left Ear: External ear normal.      Nose: Nose normal.   Eyes:      General: No scleral icterus.     Conjunctiva/sclera: Conjunctivae normal.   Cardiovascular:      Rate and Rhythm: Normal rate and regular rhythm.      Pulses: Normal pulses.      Heart sounds: Normal heart sounds.   Pulmonary:      Effort: No respiratory distress.      Breath sounds: No wheezing (Scattered wheezes bilaterally), rhonchi or  rales.   Abdominal:      General: Abdomen is flat. Bowel sounds are normal. There is no distension.      Palpations: Abdomen is soft.      Tenderness: There is no abdominal tenderness. There is no guarding.   Musculoskeletal:         General: No swelling or tenderness.   Skin:     General: Skin is warm and dry.      Findings: No erythema or rash.   Neurological:      General: No focal deficit present.      Mental Status: He is alert and oriented to person, place, and time.   Psychiatric:         Mood and Affect: Mood normal.         Behavior: Behavior normal.         Significant Labs: All pertinent labs within the past 24 hours have been reviewed.  BMP:   Recent Labs   Lab 02/01/23 1848 02/02/23  0430 02/03/23  0536      < > 133*   K 3.1*   < > 4.7   CO2 20*   < > 16*   BUN 64.0*   < > 102.0*   CREATININE 4.98*   < > 7.38*   CALCIUM 8.3*   < > 7.0*   MG 2.70*  --   --     < > = values in this interval not displayed.       CBC:   Recent Labs   Lab 02/01/23 1848 02/02/23 0430 02/03/23  0536   WBC 7.5 7.3 4.8   HGB 13.7 13.7 12.2*   HCT 39.1 39.1 36.0   * 132* 134*         Significant Imaging: I have reviewed all pertinent imaging results/findings within the past 24 hours.      1. Bibasilar consolidation is noted within both lung bases (more pronounced on the right) and is suspicious for bibasilar pneumonic infiltrates.  2. A 2.5-3 cm, round, noncalcified nodule is noted posteriorly within the left lung base and is suspicious for a neoplastic/metastatic process.  3. A 2.1 cm, hypodense nodule is noted originating from the left adrenal gland.  This does not appear particularly worrisome and I suspect represents a benign adenoma.  4. Left-sided, nonobstructing nephrolithiasis as described above.  5. The prostate gland is enlarged (5.5-6 cm).  I suspect the changes are related to BPH, however, correlation with the patient's physical examination and PSA is recommended.  6. Chronic changes are present as  described above.

## 2023-02-03 NOTE — ASSESSMENT & PLAN NOTE
In despite IV bolus gave IV fluids overnight renal function is worsening  Discussed with patient we will arrange for transfer to accepting facility with Nephrology

## 2023-02-04 PROBLEM — G47.33 OSA (OBSTRUCTIVE SLEEP APNEA): Status: ACTIVE | Noted: 2023-02-04

## 2023-02-04 PROBLEM — E11.9 TYPE 2 DIABETES MELLITUS, WITHOUT LONG-TERM CURRENT USE OF INSULIN: Status: ACTIVE | Noted: 2023-02-04

## 2023-02-04 PROBLEM — E66.01 CLASS 2 SEVERE OBESITY DUE TO EXCESS CALORIES WITH SERIOUS COMORBIDITY AND BODY MASS INDEX (BMI) OF 36.0 TO 36.9 IN ADULT: Status: ACTIVE | Noted: 2023-02-04

## 2023-02-04 PROBLEM — E66.812 CLASS 2 SEVERE OBESITY DUE TO EXCESS CALORIES WITH SERIOUS COMORBIDITY AND BODY MASS INDEX (BMI) OF 36.0 TO 36.9 IN ADULT: Status: ACTIVE | Noted: 2023-02-04

## 2023-02-04 LAB
ALBUMIN SERPL BCP-MCNC: 2.5 G/DL (ref 3.5–5.2)
ALP SERPL-CCNC: 67 U/L (ref 55–135)
ALT SERPL W/O P-5'-P-CCNC: 59 U/L (ref 10–44)
ANION GAP SERPL CALC-SCNC: 17 MMOL/L (ref 8–16)
ANISOCYTOSIS BLD QL SMEAR: SLIGHT
AST SERPL-CCNC: 64 U/L (ref 10–40)
BACTERIA #/AREA URNS HPF: ABNORMAL /HPF
BASOPHILS # BLD AUTO: 0.02 K/UL (ref 0–0.2)
BASOPHILS NFR BLD: 0.2 % (ref 0–1.9)
BILIRUB SERPL-MCNC: 0.4 MG/DL (ref 0.1–1)
BILIRUB UR QL STRIP: NEGATIVE
BUN SERPL-MCNC: 113 MG/DL (ref 8–23)
BURR CELLS BLD QL SMEAR: ABNORMAL
CALCIUM SERPL-MCNC: 8 MG/DL (ref 8.7–10.5)
CHLORIDE SERPL-SCNC: 104 MMOL/L (ref 95–110)
CK SERPL-CCNC: 387 U/L (ref 20–200)
CLARITY UR: CLEAR
CO2 SERPL-SCNC: 11 MMOL/L (ref 23–29)
COLOR UR: COLORLESS
CREAT SERPL-MCNC: 7.6 MG/DL (ref 0.5–1.4)
DACRYOCYTES BLD QL SMEAR: ABNORMAL
DIFFERENTIAL METHOD: ABNORMAL
EOSINOPHIL # BLD AUTO: 0 K/UL (ref 0–0.5)
EOSINOPHIL NFR BLD: 0 % (ref 0–8)
EOSINOPHIL URNS QL WRIGHT STN: NORMAL
ERYTHROCYTE [DISTWIDTH] IN BLOOD BY AUTOMATED COUNT: 13 % (ref 11.5–14.5)
EST. GFR  (NO RACE VARIABLE): 7 ML/MIN/1.73 M^2
GIANT PLATELETS BLD QL SMEAR: ABNORMAL
GLUCOSE SERPL-MCNC: 592 MG/DL (ref 70–110)
GLUCOSE UR QL STRIP: ABNORMAL
HCT VFR BLD AUTO: 38.3 % (ref 40–54)
HGB BLD-MCNC: 12.9 G/DL (ref 14–18)
HGB UR QL STRIP: ABNORMAL
HYALINE CASTS #/AREA URNS LPF: 1 /LPF
IMM GRANULOCYTES # BLD AUTO: 0.24 K/UL (ref 0–0.04)
IMM GRANULOCYTES NFR BLD AUTO: 2.8 % (ref 0–0.5)
KETONES UR QL STRIP: NEGATIVE
LEUKOCYTE ESTERASE UR QL STRIP: NEGATIVE
LYMPHOCYTES # BLD AUTO: 1.3 K/UL (ref 1–4.8)
LYMPHOCYTES NFR BLD: 14.6 % (ref 18–48)
MCH RBC QN AUTO: 28.8 PG (ref 27–31)
MCHC RBC AUTO-ENTMCNC: 33.7 G/DL (ref 32–36)
MCV RBC AUTO: 86 FL (ref 82–98)
MICROSCOPIC COMMENT: ABNORMAL
MONOCYTES # BLD AUTO: 1 K/UL (ref 0.3–1)
MONOCYTES NFR BLD: 11.4 % (ref 4–15)
NEUTROPHILS # BLD AUTO: 6.1 K/UL (ref 1.8–7.7)
NEUTROPHILS NFR BLD: 71 % (ref 38–73)
NITRITE UR QL STRIP: NEGATIVE
NRBC BLD-RTO: 0 /100 WBC
OVALOCYTES BLD QL SMEAR: ABNORMAL
PH UR STRIP: 6 [PH] (ref 5–8)
PLATELET # BLD AUTO: 157 K/UL (ref 150–450)
PLATELET BLD QL SMEAR: ABNORMAL
PMV BLD AUTO: 10.3 FL (ref 9.2–12.9)
POCT GLUCOSE: 263 MG/DL (ref 70–110)
POCT GLUCOSE: 323 MG/DL (ref 70–110)
POCT GLUCOSE: 448 MG/DL (ref 70–110)
POCT GLUCOSE: >500 MG/DL (ref 70–110)
POIKILOCYTOSIS BLD QL SMEAR: SLIGHT
POTASSIUM SERPL-SCNC: 4.2 MMOL/L (ref 3.5–5.1)
PROT SERPL-MCNC: 6 G/DL (ref 6–8.4)
PROT UR QL STRIP: ABNORMAL
RBC # BLD AUTO: 4.48 M/UL (ref 4.6–6.2)
RBC #/AREA URNS HPF: 36 /HPF (ref 0–4)
SODIUM SERPL-SCNC: 132 MMOL/L (ref 136–145)
SP GR UR STRIP: 1.01 (ref 1–1.03)
URN SPEC COLLECT METH UR: ABNORMAL
UROBILINOGEN UR STRIP-ACNC: NEGATIVE EU/DL
WBC # BLD AUTO: 8.58 K/UL (ref 3.9–12.7)
WBC #/AREA URNS HPF: 3 /HPF (ref 0–5)
WBC CLUMPS URNS QL MICRO: ABNORMAL
YEAST URNS QL MICRO: ABNORMAL

## 2023-02-04 PROCEDURE — 63600175 PHARM REV CODE 636 W HCPCS: Performed by: HOSPITALIST

## 2023-02-04 PROCEDURE — 36415 COLL VENOUS BLD VENIPUNCTURE: CPT | Performed by: INTERNAL MEDICINE

## 2023-02-04 PROCEDURE — 83874 ASSAY OF MYOGLOBIN: CPT | Performed by: INTERNAL MEDICINE

## 2023-02-04 PROCEDURE — 94761 N-INVAS EAR/PLS OXIMETRY MLT: CPT

## 2023-02-04 PROCEDURE — 27000221 HC OXYGEN, UP TO 24 HOURS

## 2023-02-04 PROCEDURE — 83874 ASSAY OF MYOGLOBIN: CPT | Mod: 91 | Performed by: INTERNAL MEDICINE

## 2023-02-04 PROCEDURE — 63600175 PHARM REV CODE 636 W HCPCS: Performed by: INTERNAL MEDICINE

## 2023-02-04 PROCEDURE — 82550 ASSAY OF CK (CPK): CPT | Performed by: INTERNAL MEDICINE

## 2023-02-04 PROCEDURE — 25000003 PHARM REV CODE 250: Performed by: INTERNAL MEDICINE

## 2023-02-04 PROCEDURE — 80053 COMPREHEN METABOLIC PANEL: CPT | Performed by: INTERNAL MEDICINE

## 2023-02-04 PROCEDURE — 25000003 PHARM REV CODE 250: Performed by: HOSPITALIST

## 2023-02-04 PROCEDURE — 85025 COMPLETE CBC W/AUTO DIFF WBC: CPT | Performed by: INTERNAL MEDICINE

## 2023-02-04 PROCEDURE — 99900035 HC TECH TIME PER 15 MIN (STAT)

## 2023-02-04 PROCEDURE — 83516 IMMUNOASSAY NONANTIBODY: CPT | Performed by: INTERNAL MEDICINE

## 2023-02-04 PROCEDURE — 87205 SMEAR GRAM STAIN: CPT | Performed by: INTERNAL MEDICINE

## 2023-02-04 PROCEDURE — 86036 ANCA SCREEN EACH ANTIBODY: CPT | Performed by: INTERNAL MEDICINE

## 2023-02-04 PROCEDURE — 99223 PR INITIAL HOSPITAL CARE,LEVL III: ICD-10-PCS | Mod: ,,, | Performed by: INTERNAL MEDICINE

## 2023-02-04 PROCEDURE — 21400001 HC TELEMETRY ROOM

## 2023-02-04 PROCEDURE — 81000 URINALYSIS NONAUTO W/SCOPE: CPT | Performed by: INTERNAL MEDICINE

## 2023-02-04 PROCEDURE — 99223 1ST HOSP IP/OBS HIGH 75: CPT | Mod: ,,, | Performed by: INTERNAL MEDICINE

## 2023-02-04 RX ORDER — SODIUM CHLORIDE 0.9 % (FLUSH) 0.9 %
3 SYRINGE (ML) INJECTION EVERY 12 HOURS PRN
Status: DISCONTINUED | OUTPATIENT
Start: 2023-02-04 | End: 2023-02-07 | Stop reason: HOSPADM

## 2023-02-04 RX ORDER — ACETAMINOPHEN 650 MG/1
650 SUPPOSITORY RECTAL EVERY 6 HOURS PRN
Status: DISCONTINUED | OUTPATIENT
Start: 2023-02-04 | End: 2023-02-07 | Stop reason: HOSPADM

## 2023-02-04 RX ORDER — GLUCAGON 1 MG
1 KIT INJECTION
Status: DISCONTINUED | OUTPATIENT
Start: 2023-02-04 | End: 2023-02-07 | Stop reason: HOSPADM

## 2023-02-04 RX ORDER — AMLODIPINE BESYLATE 5 MG/1
5 TABLET ORAL DAILY
Status: DISCONTINUED | OUTPATIENT
Start: 2023-02-04 | End: 2023-02-07 | Stop reason: HOSPADM

## 2023-02-04 RX ORDER — MUPIROCIN 20 MG/G
OINTMENT TOPICAL 2 TIMES DAILY
Status: DISCONTINUED | OUTPATIENT
Start: 2023-02-04 | End: 2023-02-07 | Stop reason: HOSPADM

## 2023-02-04 RX ORDER — IBUPROFEN 200 MG
24 TABLET ORAL
Status: DISCONTINUED | OUTPATIENT
Start: 2023-02-04 | End: 2023-02-07 | Stop reason: HOSPADM

## 2023-02-04 RX ORDER — POLYETHYLENE GLYCOL 3350 17 G/17G
17 POWDER, FOR SOLUTION ORAL DAILY PRN
Status: DISCONTINUED | OUTPATIENT
Start: 2023-02-04 | End: 2023-02-07 | Stop reason: HOSPADM

## 2023-02-04 RX ORDER — GLUCAGON 1 MG
1 KIT INJECTION
Status: DISCONTINUED | OUTPATIENT
Start: 2023-02-04 | End: 2023-02-04 | Stop reason: SDUPTHER

## 2023-02-04 RX ORDER — NALOXONE HCL 0.4 MG/ML
0.02 VIAL (ML) INJECTION
Status: DISCONTINUED | OUTPATIENT
Start: 2023-02-04 | End: 2023-02-07 | Stop reason: HOSPADM

## 2023-02-04 RX ORDER — OSELTAMIVIR PHOSPHATE 30 MG/1
30 CAPSULE ORAL DAILY
Status: DISCONTINUED | OUTPATIENT
Start: 2023-02-04 | End: 2023-02-04

## 2023-02-04 RX ORDER — AMOXICILLIN 250 MG
1 CAPSULE ORAL 2 TIMES DAILY
Status: DISCONTINUED | OUTPATIENT
Start: 2023-02-04 | End: 2023-02-07 | Stop reason: HOSPADM

## 2023-02-04 RX ORDER — ACETAMINOPHEN 325 MG/1
650 TABLET ORAL EVERY 8 HOURS PRN
Status: DISCONTINUED | OUTPATIENT
Start: 2023-02-04 | End: 2023-02-07 | Stop reason: HOSPADM

## 2023-02-04 RX ORDER — INSULIN ASPART 100 [IU]/ML
0-15 INJECTION, SOLUTION INTRAVENOUS; SUBCUTANEOUS EVERY 6 HOURS PRN
Status: DISCONTINUED | OUTPATIENT
Start: 2023-02-04 | End: 2023-02-07 | Stop reason: HOSPADM

## 2023-02-04 RX ORDER — HYDROCODONE BITARTRATE AND ACETAMINOPHEN 5; 325 MG/1; MG/1
1 TABLET ORAL EVERY 6 HOURS PRN
Status: DISCONTINUED | OUTPATIENT
Start: 2023-02-04 | End: 2023-02-04

## 2023-02-04 RX ORDER — AMLODIPINE BESYLATE 5 MG/1
5 TABLET ORAL DAILY
COMMUNITY

## 2023-02-04 RX ORDER — IBUPROFEN 200 MG
16 TABLET ORAL
Status: DISCONTINUED | OUTPATIENT
Start: 2023-02-04 | End: 2023-02-07 | Stop reason: HOSPADM

## 2023-02-04 RX ORDER — PROMETHAZINE HYDROCHLORIDE 25 MG/1
25 TABLET ORAL EVERY 6 HOURS PRN
Status: DISCONTINUED | OUTPATIENT
Start: 2023-02-04 | End: 2023-02-07 | Stop reason: HOSPADM

## 2023-02-04 RX ORDER — IPRATROPIUM BROMIDE AND ALBUTEROL SULFATE 2.5; .5 MG/3ML; MG/3ML
3 SOLUTION RESPIRATORY (INHALATION) EVERY 6 HOURS PRN
Status: DISCONTINUED | OUTPATIENT
Start: 2023-02-04 | End: 2023-02-07

## 2023-02-04 RX ORDER — ONDANSETRON 2 MG/ML
4 INJECTION INTRAMUSCULAR; INTRAVENOUS EVERY 8 HOURS PRN
Status: DISCONTINUED | OUTPATIENT
Start: 2023-02-04 | End: 2023-02-07 | Stop reason: HOSPADM

## 2023-02-04 RX ORDER — MORPHINE SULFATE 2 MG/ML
2 INJECTION, SOLUTION INTRAMUSCULAR; INTRAVENOUS EVERY 4 HOURS PRN
Status: DISCONTINUED | OUTPATIENT
Start: 2023-02-04 | End: 2023-02-04

## 2023-02-04 RX ORDER — IBUPROFEN 200 MG
24 TABLET ORAL
Status: DISCONTINUED | OUTPATIENT
Start: 2023-02-04 | End: 2023-02-04 | Stop reason: SDUPTHER

## 2023-02-04 RX ORDER — IBUPROFEN 200 MG
16 TABLET ORAL
Status: DISCONTINUED | OUTPATIENT
Start: 2023-02-04 | End: 2023-02-04 | Stop reason: SDUPTHER

## 2023-02-04 RX ORDER — AMOXICILLIN 250 MG
1 CAPSULE ORAL 2 TIMES DAILY PRN
Status: DISCONTINUED | OUTPATIENT
Start: 2023-02-04 | End: 2023-02-07 | Stop reason: HOSPADM

## 2023-02-04 RX ORDER — TALC
6 POWDER (GRAM) TOPICAL NIGHTLY PRN
Status: DISCONTINUED | OUTPATIENT
Start: 2023-02-04 | End: 2023-02-07 | Stop reason: HOSPADM

## 2023-02-04 RX ORDER — OSELTAMIVIR PHOSPHATE 30 MG/1
30 CAPSULE ORAL EVERY OTHER DAY
Status: COMPLETED | OUTPATIENT
Start: 2023-02-06 | End: 2023-02-06

## 2023-02-04 RX ORDER — METOPROLOL TARTRATE 25 MG/1
25 TABLET, FILM COATED ORAL 2 TIMES DAILY
Status: DISCONTINUED | OUTPATIENT
Start: 2023-02-04 | End: 2023-02-07 | Stop reason: HOSPADM

## 2023-02-04 RX ORDER — ENOXAPARIN SODIUM 100 MG/ML
30 INJECTION SUBCUTANEOUS EVERY 24 HOURS
Status: DISCONTINUED | OUTPATIENT
Start: 2023-02-04 | End: 2023-02-05

## 2023-02-04 RX ORDER — MAG HYDROX/ALUMINUM HYD/SIMETH 200-200-20
30 SUSPENSION, ORAL (FINAL DOSE FORM) ORAL 4 TIMES DAILY PRN
Status: DISCONTINUED | OUTPATIENT
Start: 2023-02-04 | End: 2023-02-04

## 2023-02-04 RX ORDER — INSULIN ASPART 100 [IU]/ML
1-10 INJECTION, SOLUTION INTRAVENOUS; SUBCUTANEOUS
Status: DISCONTINUED | OUTPATIENT
Start: 2023-02-04 | End: 2023-02-04

## 2023-02-04 RX ADMIN — INSULIN ASPART 10 UNITS: 100 INJECTION, SOLUTION INTRAVENOUS; SUBCUTANEOUS at 04:02

## 2023-02-04 RX ADMIN — INSULIN ASPART 10 UNITS: 100 INJECTION, SOLUTION INTRAVENOUS; SUBCUTANEOUS at 06:02

## 2023-02-04 RX ADMIN — SODIUM BICARBONATE: 84 INJECTION, SOLUTION INTRAVENOUS at 06:02

## 2023-02-04 RX ADMIN — OSELTAMIVIR PHOSPHATE 30 MG: 30 CAPSULE ORAL at 08:02

## 2023-02-04 RX ADMIN — GUAIFENESIN AND DEXTROMETHORPHAN HYDROBROMIDE 1 TABLET: 600; 30 TABLET, EXTENDED RELEASE ORAL at 11:02

## 2023-02-04 RX ADMIN — CEFTRIAXONE 1 G: 1 INJECTION, POWDER, FOR SOLUTION INTRAMUSCULAR; INTRAVENOUS at 06:02

## 2023-02-04 RX ADMIN — AMLODIPINE BESYLATE 5 MG: 5 TABLET ORAL at 08:02

## 2023-02-04 RX ADMIN — METOPROLOL TARTRATE 25 MG: 25 TABLET, FILM COATED ORAL at 11:02

## 2023-02-04 RX ADMIN — MUPIROCIN: 20 OINTMENT TOPICAL at 08:02

## 2023-02-04 RX ADMIN — ENOXAPARIN SODIUM 30 MG: 40 INJECTION SUBCUTANEOUS at 05:02

## 2023-02-04 RX ADMIN — INSULIN ASPART 12 UNITS: 100 INJECTION, SOLUTION INTRAVENOUS; SUBCUTANEOUS at 11:02

## 2023-02-04 RX ADMIN — INSULIN ASPART 9 UNITS: 100 INJECTION, SOLUTION INTRAVENOUS; SUBCUTANEOUS at 05:02

## 2023-02-04 RX ADMIN — INSULIN HUMAN 10 UNITS: 100 INJECTION, SOLUTION PARENTERAL at 08:02

## 2023-02-04 RX ADMIN — SODIUM BICARBONATE: 84 INJECTION, SOLUTION INTRAVENOUS at 01:02

## 2023-02-04 RX ADMIN — MUPIROCIN: 20 OINTMENT TOPICAL at 11:02

## 2023-02-04 RX ADMIN — METOPROLOL TARTRATE 25 MG: 25 TABLET, FILM COATED ORAL at 08:02

## 2023-02-04 RX ADMIN — SODIUM BICARBONATE: 84 INJECTION, SOLUTION INTRAVENOUS at 02:02

## 2023-02-04 RX ADMIN — GUAIFENESIN AND DEXTROMETHORPHAN HYDROBROMIDE 1 TABLET: 600; 30 TABLET, EXTENDED RELEASE ORAL at 08:02

## 2023-02-04 NOTE — ASSESSMENT & PLAN NOTE
- BUN 64, Cr 4.98 on 02/01 on admission to OSH, uptrended to 102 and 7.38 respectively despite IV fluids. [place] [facility] [place]. Prev baseline   - Nephrology consulted- Dr. Byrd following- discussed with Dr. Byrd-- concern for pigment nephropathy as pt has large occult blood but no RBC vs. AIN in setting of azithro use, less likely vasculitis type picture. No need for urgent HD at this time, will continue to eval   - STOP Azithro   - Renal U/S shows no hydronephrosis, acute L medical renal disease, nonobstructing L renal calcification   - monitor strict IS and OS- ledezma in place  - monitor BMP   - renally dose meds; avoid nephrotoxics   - continue sodium bicarb infusion for acidosis

## 2023-02-04 NOTE — ASSESSMENT & PLAN NOTE
Patient's FSGs are uncontrolled due to hyperglycemia on current medication regimen.  Last A1c reviewed-   Lab Results   Component Value Date    HGBA1C 7.8 (H) 02/03/2023     Most recent fingerstick glucose reviewed-   Recent Labs   Lab 02/04/23  0619 02/04/23  0831 02/04/23  1057 02/04/23  1505   POCTGLUCOSE >500* 448* 323* 263*     Current correctional scale  Low  titrate as needed anti-hyperglycemic dose as follows-   Antihyperglycemics (From admission, onward)    Start     Stop Route Frequency Ordered    02/04/23 0916  insulin aspart U-100 pen 0-15 Units         -- SubQ Every 6 hours PRN 02/04/23 0817      Plan:  -SSI increased to high dose   -steroids stopped  - if BG remain elevated, will add long acting insulin but will need to start low dose due to ARF   -hypoglycemic protocol  -patient will likely need diabetic regimen upon hospital discharge

## 2023-02-04 NOTE — ASSESSMENT & PLAN NOTE
Patient diagnosed with bilateral lower lobe pneumonia at outside facility and was initiated on Rocephin and azithromycin.  Patient remains afebrile without leukocytosis and currently saturating 95% on 3 L via nasal cannula in no acute distress without use of accessory muscles noted.  Patient currently speaking in full sentences.  Blood cultures negative to date.  Patient COVID negative but influenza A positive.  Currently on Tamiflu.  Plan:  -continue antibiotics and Tamiflu  -f/u cultures  -Tylenol p.r.n. for fever  -continue antiemetics and antitussives p.r.n.  -discontinue steroids  -titrate oxygen therapy as needed  -incentive spirometry  -continuous pulse ox  -duo nebs p.r.n.

## 2023-02-04 NOTE — ASSESSMENT & PLAN NOTE
Patient found to have lung nodule on recent imaging which was noted back in March 2021 and currently follows Dr. Jimenez in [place] from pulmonology.  Plan:  -f/u outpatient with pulmonology as directed

## 2023-02-04 NOTE — NURSING TRANSFER
Nursing Transfer Note      2/3/2023     Reason patient is being transferred: NEPHROLOGY    Transfer To: [facility]    Transfer via stretcher    Transfer with IV FLUIDS, to O2, cardiac monitoring    Transported by [facility]    Medicines sent: NO    Any special needs or follow-up needed: NONE    Chart send with patient: No    Notified: PATIENT SPOKE WITH FAMILY    Patient reassessed at: 2/3/23 2015

## 2023-02-04 NOTE — HPI
Dereje Aguirre is a 62 y.o. male with a PMH  has a past medical history of High cholesterol and Hypertension. who presented as a transfer from outside facility for higher level of care and nephrology consultation.  Presenting history noted below from  and outside hospital as as follows:    Patient is a 62-year-old man with history of hypertension/hyperlipidemia who had presented to his PCP Andrey Vidales MD for issues of worsening shortness of breath and cough. I spoke with Dr. Vidales and pt had been started on Azithromax and Augmentin po and was worsening so he came back in to his office on 02/01 with worsening symtpoms, Dr. Vidales felt he failed outpt management and called for admission to hospital      Patient states that for the past couple of weeks he has been having difficulty with symptoms of shortness of breath and cough.  He had gone to see his primary care physician who had treated him for a pneumonia.    Despite being treated for the symptoms patient continued to have runny nose, sinus congestion and cough.  Initially the cough was considered dry but has become more productive in the past week.  Patient states that due to the persistent cough he developed pleuritic chest pain and upper abdominal pain that gets worse when coughing and taking deep breaths.  Patient states that he had previously not had issues of shortness of breath on exertion but that has since gotten worse to the point where patient has become short of breath on minimal exertion.  Denies any constipation but has been having difficulty with diarrhea, decreased appetite and decreased oral intake.  Patient states that he has been feeling bad a lot, belching and not feeling as well as he did before.    Hospital Course:     02/02/2023 patient admitted from PCP office on 02/01 with failure of outpatient treatment known influenza A positive worsening shortness of breath requiring inpatient admission he is on 3 L of oxygen at present satting  91%  Patient BUN creatinine were 64 and 4.9 on admit this a.m. there up to 72 and 5.3 glucose was 233 also has some mild elevation of his liver functions he is no known prior history of any renal insufficiency see chest also showed a left lower lobe nodule patient states he was seen by Dr. Jimenez pulmonologist in [place] for this in the past was told it was fungal was treated with medicine for 6 months and the lesion has been stable since patient reports shortness of breath this a.m. very easily fatigued denies chest pain at present    02/03/2023 patient admitted with flu found to have worsening renal function creatinine is up to 7.3 this a.m. I called the transfer center this morning we are working on finding somewhere that has a renal/nephrologist [facility] on diversion we did place a Lee this morning and she got about 800 cc out with placement patient says he really did not have the urge to urinate denies any history of prostate trouble clinically he feels better as far as his breathing and congestion today we will await word from the transfer center regarding transfer we will leave his acute renal failure is likely multifactorial and hope he will respond to continued care without having to go on dialysis but feel he needs an inpatient renal consult at this time due to worsening renal function this was explained to the patient    02/03/2023 hospital course patient was admitted from PCP found to have mild renal insufficiency we gave him ivf with bolus and held nephrotoxic med for the however his renal function has not improved  I spoke to the transfer center this morning and he is going to be transferred to [facility] there other issues include chronic AFib for which he is on Eliquis he has some hyperglycemia we have ordered him some sliding scale A1c was measured at 7.8 he was not on any meds for diabetes at home prior to admit we have continued his metoprolol he was on  an ACE inhibitor prior to admit which has been held also holding his lipitor due to some elevated liver function    At time of bedside assessment, patient lying in bed in no acute distress upon entering room earlier tonight without any concerns or complaints.  Patient currently pain-free and denies endorsing any lightheadedness, dizziness, headache, visual changes, fever, chills, sweats, nausea, vomiting, chest pain, shortness a breath, abdominal pain, bowel/bladder incontinence, muscle weakness, arthralgias, or onset neurological deficits.  Patient does report endorsing clear productive cough and dry mouth but reported all other review of systems negative except as noted above.  Patient updated on treatment plan in regards to Nephrology consultation and continuation of IVFs, treatment of hyperglycemia, and TRENT and agree with treatment plan moving forward.    PCP: Andrey Vidales

## 2023-02-04 NOTE — NURSING
[facility] HERE TO TRANSPORT PATIENT TO [facility]. LEAVING IN STABLE CONDITION VIA STRETCHER. ABRIL JOHNSON AT [facility] NOTIFIED OF TRANSFER.

## 2023-02-04 NOTE — ASSESSMENT & PLAN NOTE
- continue Tamiflu-will decrease dose to 30 every other day based on renal function for total course of 5d   - supportive care, droplet precautions   - wean O2 as tolerated to maintain sats> 90%

## 2023-02-04 NOTE — ASSESSMENT & PLAN NOTE
Patient found to have worsening renal function despite fluid resuscitation and avoidance of nephrotoxins with creatinine/GFR now measuring 12/7 respectfully.  Patient reported normal renal function in no underlying kidney disease prior to admission.  Nephrology consulted and awaiting further evaluation/recommendations.  Patient initiated on sodium bicarb and half-normal saline fluids.  Plan:  -continue IVFs  -monitor renal function  -avoid nephrotoxins  -renally dose medications  -f/u nephrology

## 2023-02-04 NOTE — PROGRESS NOTES
[facility] Medicine  Progress Note    Patient Name: Dereje Aguirre  MRN: [id number]  Patient Class: IP- Inpatient   Admission Date: 2/3/2023  Length of Stay: 1 days  Attending Physician: Clara Dhillon MD  Primary Care Provider: Andrey Vidales MD        Subjective:     Principal Problem:<principal problem not specified>        HPI:  Dereje Aguirre is a 62 y.o. male with a PMH  has a past medical history of High cholesterol and Hypertension. who presented as a transfer from outside facility for higher level of care and nephrology consultation.  Presenting history noted below from [facility] and outside hospital as as follows:    Patient is a 62-year-old man with history of hypertension/hyperlipidemia who had presented to his PCP Andrey Vidales MD for issues of worsening shortness of breath and cough. I spoke with Dr. Vidales and pt had been started on Azithromax and Augmentin po and was worsening so he came back in to his office on 02/01 with worsening symtpoms, Dr. Vidales felt he failed outpt management and called for admission to hospital      Patient states that for the past couple of weeks he has been having difficulty with symptoms of shortness of breath and cough.  He had gone to see his primary care physician who had treated him for a pneumonia.    Despite being treated for the symptoms patient continued to have runny nose, sinus congestion and cough.  Initially the cough was considered dry but has become more productive in the past week.  Patient states that due to the persistent cough he developed pleuritic chest pain and upper abdominal pain that gets worse when coughing and taking deep breaths.  Patient states that he had previously not had issues of shortness of breath on exertion but that has since gotten worse to the point where patient has become short of breath on minimal exertion.  Denies any constipation but has been having difficulty with diarrhea, decreased appetite and  decreased oral intake.  Patient states that he has been feeling bad a lot, belching and not feeling as well as he did before.    Hospital Course:     02/02/2023 patient admitted from PCP office on 02/01 with failure of outpatient treatment known influenza A positive worsening shortness of breath requiring inpatient admission he is on 3 L of oxygen at present satting 91%  Patient BUN creatinine were 64 and 4.9 on admit this a.m. there up to 72 and 5.3 glucose was 233 also has some mild elevation of his liver functions he is no known prior history of any renal insufficiency see chest also showed a left lower lobe nodule patient states he was seen by Dr. Jimenez pulmonologist in [place] for this in the past was told it was fungal was treated with medicine for 6 months and the lesion has been stable since patient reports shortness of breath this a.m. very easily fatigued denies chest pain at present    02/03/2023 patient admitted with flu found to have worsening renal function creatinine is up to 7.3 this a.m. I called the transfer center this morning we are working on finding somewhere that has a renal/nephrologist [facility] on diversion we did place a [facility] this morning and she got about 800 cc out with placement patient says he really did not have the urge to urinate denies any history of prostate trouble clinically he feels better as far as his breathing and congestion today we will await word from the transfer center regarding transfer we will leave his acute renal failure is likely multifactorial and hope he will respond to continued care without having to go on dialysis but feel he needs an inpatient renal consult at this time due to worsening renal function this was explained to the patient    02/03/2023 hospital course patient was admitted from PCP found to have mild renal insufficiency we gave him ivf with bolus and held nephrotoxic med for the however his renal function has not improved   I spoke to the transfer center this morning and he is going to be transferred to [place] facility [facility] there other issues include chronic AFib for which he is on Eliquis he has some hyperglycemia we have ordered him some sliding scale A1c was measured at 7.8 he was not on any meds for diabetes at home prior to admit we have continued his metoprolol he was on an ACE inhibitor prior to admit which has been held also holding his lipitor due to some elevated liver function    At time of bedside assessment, patient lying in bed in no acute distress upon entering room earlier tonight without any concerns or complaints.  Patient currently pain-free and denies endorsing any lightheadedness, dizziness, headache, visual changes, fever, chills, sweats, nausea, vomiting, chest pain, shortness a breath, abdominal pain, bowel/bladder incontinence, muscle weakness, arthralgias, or onset neurological deficits.  Patient does report endorsing clear productive cough and dry mouth but reported all other review of systems negative except as noted above.  Patient updated on treatment plan in regards to Nephrology consultation and continuation of IVFs, treatment of hyperglycemia, and TRENT and agree with treatment plan moving forward.    PCP: Andrey Vidales        Overview/Hospital Course:  Pt admitted as a transfer overnight on 02/03 from OSH for management of acute renal failure. Steroids discontinued on admission due to hyperglycemia. PTA antibiotics of Rocephin/Azithromycin continued. Pt started on IV sodium bicarb for metabolic acidosis in setting of renal failure. Nephrology consulted for evaluation.       Interval History: NAEON. Persistently hyperglycemic, was on steroids PTA which were discontinued. Pt awake, alert, in NAD. Denies CP, SOB. Has nonproductive cough. Denies abd pain, nausea, vomiting. Last BM was 5 days prior. Discussed with renal Dr. Byrd- no acute indication of urgent HD right now as pt mental status wnl  and electrolytes ok. Will plan for renal biopsy on Monday 02/06    Review of Systems  Objective:     Vital Signs (Most Recent):  Temp: 97.9 °F (36.6 °C) (02/04/23 1151)  Pulse: 81 (02/04/23 1151)  Resp: 18 (02/04/23 1151)  BP: 134/73 (02/04/23 1151)  SpO2: 96 % (02/04/23 1151) Vital Signs (24h Range):  Temp:  [96.9 °F (36.1 °C)-97.9 °F (36.6 °C)] 97.9 °F (36.6 °C)  Pulse:  [78-89] 81  Resp:  [18-20] 18  SpO2:  [92 %-96 %] 96 %  BP: (131-152)/(65-78) 134/73     Weight: 119.8 kg (264 lb 1.8 oz)  Body mass index is 36.84 kg/m².    Intake/Output Summary (Last 24 hours) at 2/4/2023 1508  Last data filed at 2/4/2023 1200  Gross per 24 hour   Intake 240 ml   Output 2500 ml   Net -2260 ml      Physical Exam  Vitals and nursing note reviewed.   Constitutional:       General: He is not in acute distress.     Appearance: He is obese.   Cardiovascular:      Rate and Rhythm: Normal rate and regular rhythm.      Heart sounds: No murmur heard.    No friction rub. No gallop.   Pulmonary:      Effort: Pulmonary effort is normal.      Breath sounds: Normal breath sounds. No wheezing, rhonchi or rales.   Abdominal:      General: There is distension.      Palpations: Abdomen is soft.      Tenderness: There is no abdominal tenderness. There is no guarding or rebound.      Comments: Bowel sounds hypoactive    Musculoskeletal:      Right lower leg: No edema.      Left lower leg: No edema.   Neurological:      General: No focal deficit present.      Mental Status: He is alert and oriented to person, place, and time. Mental status is at baseline.       Significant Labs: All pertinent labs within the past 24 hours have been reviewed.    Significant Imaging: I have reviewed all pertinent imaging results/findings within the past 24 hours.      Assessment/Plan:      Acute renal failure  - BUN 64, Cr 4.98 on 02/01 on admission to OSH, uptrended to 102 and 7.38 respectively despite IV fluids. Pt transferred to [facility] for renal evaluation. Prev  baseline   - Nephrology consulted- Dr. Byrd following- discussed with Dr. Byrd-- concern for pigment nephropathy as pt has large occult blood but no RBC vs. AIN in setting of azithro use, less likely vasculitis type picture. No need for urgent HD at this time, will continue to eval   - STOP Azithro   - Renal U/S shows no hydronephrosis, acute L medical renal disease, nonobstructing L renal calcification   - monitor strict IS and OS- ledezma in place  - monitor BMP   - renally dose meds; avoid nephrotoxics   - continue sodium bicarb infusion for acidosis       Influenza A  - continue Tamiflu-will decrease dose to 30 every other day based on renal function for total course of 5d   - supportive care, droplet precautions   - wean O2 as tolerated to maintain sats> 90%       Pneumonia  - Recevied rocephin + Azithro at OSH as CXR showed RLL infiltrate vs. Atelectasis  - continue Rocephin (day 4)   - stop Azithro   - Wean o2 as tolerated to maintain sats > 90%     Chronic a-fib  Patient with Long standing persistent (>12 months) atrial fibrillation which is controlled currently with Beta Blocker. Patient is currently in sinus rhythm.[id number] Score: 1. Anticoagulation indicated. Anticoagulation done with Eliquis outpatient but currently on Lovenox due to renal function..  - continue Lopressor   - continue therapeutic lovenox (renal dosing)   - lovenox will need to be stopped 24 hrs prior to biopsy due to Cr clearance per pharmacy         Type 2 diabetes mellitus, without long-term current use of insulin  Patient's FSGs are uncontrolled due to hyperglycemia on current medication regimen.  Last A1c reviewed-   Lab Results   Component Value Date    HGBA1C 7.8 (H) 02/03/2023     Most recent fingerstick glucose reviewed-   Recent Labs   Lab 02/04/23  0619 02/04/23  0831 02/04/23  1057 02/04/23  1505   POCTGLUCOSE >500* 448* 323* 263*     Current correctional scale  Low  titrate as needed anti-hyperglycemic dose as follows-    Antihyperglycemics (From admission, onward)    Start     Stop Route Frequency Ordered    02/04/23 0916  insulin aspart U-100 pen 0-15 Units         -- SubQ Every 6 hours PRN 02/04/23 0817      Plan:  -SSI increased to high dose   -steroids stopped  - if BG remain elevated, will add long acting insulin but will need to start low dose due to ARF   -hypoglycemic protocol  -patient will likely need diabetic regimen upon hospital discharge    ELENA (obstructive sleep apnea)  - resume CPAP qhs     Class 2 severe obesity due to excess calories with serious comorbidity and body mass index (BMI) of 36.0 to 36.9 in adult  Body mass index is 36.84 kg/m². Elevation likely secondary to increased calorie intake and sedentary lifestyle. Patient educated on morbidity and mortality in regards to elevated BMI and stressed importance of diet and exercise.   Plan:  -low fat/low calorie diet       Hyperlipidemia  - statin held due to elevated LFTs       HTN (hypertension)  - currently normotensive   - Continue Norvasc   - ARB held due to ARF   - monitor BP       Lung nodule  - CT abd on admission at OSH showed 2.5-3 cm, round, noncalcified nodule is noted posteriorly within the left lung base and is suspicious for a neoplastic/metastatic process  - Per chart review, pt had CT chest which showed lung nodule in 03/2021 and biopsy was recommended at the time  - will need to discuss with patient re: prior work up as he reports he sees pulmonologist outside of here     Transaminitis  -LFTs downtrending, may be in setting of viral URI. Abd imaging at OSH shows no liver or gallbladder abnormalities  - monitor CMP         VTE Risk Mitigation (From admission, onward)         Ordered     enoxaparin injection 30 mg  Daily         02/04/23 0038     IP VTE HIGH RISK PATIENT  Once         02/04/23 0038     Place sequential compression device  Until discontinued         02/04/23 0038                Discharge Planning   BARBER:      Code Status: Full Code    Is the patient medically ready for discharge?:     Reason for patient still in hospital (select all that apply): Patient trending condition  Discharge Plan A: Home                  Clara Dhillon MD  Department of Hospital Medicine   [facility] ([facility])

## 2023-02-04 NOTE — NURSING
REPORT CALLED TO ABRIL JOHNSON AT [facility]. VERBALIZED UNDERSTANDING. GIVEN TIME TO ASK QUESTIONS.

## 2023-02-04 NOTE — H&P
[facility] Medicine  History & Physical    Patient Name: Dereje Aguirre  MRN: [id number]  Patient Class: IP- Inpatient  Admission Date: 2/3/2023  Attending Physician: Clara Dhillon MD   Primary Care Provider: Andrey Vidales MD         Patient information was obtained from patient, past medical records and ER records.     Subjective:     Principal Problem:<principal problem not specified>    Chief Complaint: No chief complaint on file.       HPI: Dereje Aguirre is a 62 y.o. male with a PMH  has a past medical history of High cholesterol and Hypertension. who presented as a transfer from outside facility for higher level of care and nephrology consultation.  Presenting history noted below from  and outside hospital as as follows:    Patient is a 62-year-old man with history of hypertension/hyperlipidemia who had presented to his PCP Andrey Vidales MD for issues of worsening shortness of breath and cough. I spoke with Dr. Vidales and pt had been started on Azithromax and Augmentin po and was worsening so he came back in to his office on 02/01 with worsening symtpoms, Dr. Vidales felt he failed outpt management and called for admission to hospital      Patient states that for the past couple of weeks he has been having difficulty with symptoms of shortness of breath and cough.  He had gone to see his primary care physician who had treated him for a pneumonia.    Despite being treated for the symptoms patient continued to have runny nose, sinus congestion and cough.  Initially the cough was considered dry but has become more productive in the past week.  Patient states that due to the persistent cough he developed pleuritic chest pain and upper abdominal pain that gets worse when coughing and taking deep breaths.  Patient states that he had previously not had issues of shortness of breath on exertion but that has since gotten worse to the point where patient has become short of breath on  minimal exertion.  Denies any constipation but has been having difficulty with diarrhea, decreased appetite and decreased oral intake.  Patient states that he has been feeling bad a lot, belching and not feeling as well as he did before.    Hospital Course:     02/02/2023 patient admitted from PCP office on 02/01 with failure of outpatient treatment known influenza A positive worsening shortness of breath requiring inpatient admission he is on 3 L of oxygen at present satting 91%  Patient BUN creatinine were 64 and 4.9 on admit this a.m. there up to 72 and 5.3 glucose was 233 also has some mild elevation of his liver functions he is no known prior history of any renal insufficiency see chest also showed a left lower lobe nodule patient states he was seen by Dr. Jimenez pulmonologist in [place] for this in the past was told it was fungal was treated with medicine for 6 months and the lesion has been stable since patient reports shortness of breath this a.m. very easily fatigued denies chest pain at present    02/03/2023 patient admitted with flu found to have worsening renal function creatinine is up to 7.3 this a.m. I called the transfer center this morning we are working on finding somewhere that has a renal/nephrologist [facility] on diversion we did place a Lee this morning and she got about 800 cc out with placement patient says he really did not have the urge to urinate denies any history of prostate trouble clinically he feels better as far as his breathing and congestion today we will await word from the transfer center regarding transfer we will leave his acute renal failure is likely multifactorial and hope he will respond to continued care without having to go on dialysis but feel he needs an inpatient renal consult at this time due to worsening renal function this was explained to the patient    02/03/2023 hospital course patient was admitted from PCP found to have mild renal  insufficiency we gave him ivf with bolus and held nephrotoxic med for the however his renal function has not improved  I spoke to the transfer center this morning and he is going to be transferred to [place] facility [facility] there other issues include chronic AFib for which he is on Eliquis he has some hyperglycemia we have ordered him some sliding scale A1c was measured at 7.8 he was not on any meds for diabetes at home prior to admit we have continued his metoprolol he was on an ACE inhibitor prior to admit which has been held also holding his lipitor due to some elevated liver function    At time of bedside assessment, patient lying in bed in no acute distress upon entering room earlier tonight without any concerns or complaints.  Patient currently pain-free and denies endorsing any lightheadedness, dizziness, headache, visual changes, fever, chills, sweats, nausea, vomiting, chest pain, shortness a breath, abdominal pain, bowel/bladder incontinence, muscle weakness, arthralgias, or onset neurological deficits.  Patient does report endorsing clear productive cough and dry mouth but reported all other review of systems negative except as noted above.  Patient updated on treatment plan in regards to Nephrology consultation and continuation of IVFs, treatment of hyperglycemia, and TRENT and agree with treatment plan moving forward.    PCP: Andrey Vidales        Past Medical History:   Diagnosis Date    High cholesterol     Hypertension        Past Surgical History:   Procedure Laterality Date    CORONARY ANGIOPLASTY WITH STENT PLACEMENT      REVISION TOTAL HIP ARTHROPLASTY Left        Review of patient's allergies indicates:  No Known Allergies    Current Facility-Administered Medications on File Prior to Encounter   Medication    [COMPLETED] insulin aspart U-100 pen 10 Units    [DISCONTINUED] 0.9%  NaCl infusion    [DISCONTINUED] acetaminophen tablet 1,000 mg    [DISCONTINUED] albuterol-ipratropium 2.5  mg-0.5 mg/3 mL nebulizer solution 3 mL    [DISCONTINUED] apixaban tablet 5 mg    [DISCONTINUED] atorvastatin tablet 40 mg    [DISCONTINUED] azithromycin (ZITHROMAX) 500 mg in dextrose 5 % (D5W) 250 mL IVPB (ADD-vantage)    [DISCONTINUED] azithromycin (ZITHROMAX) 500 mg in dextrose 5 % (D5W) 250 mL IVPB (Vial-Mate)    [DISCONTINUED] benzonatate capsule 100 mg    [DISCONTINUED] cefTRIAXone (ROCEPHIN) 1 g in dextrose 5 % in water (D5W) 5 % 50 mL IVPB (MB+)    [DISCONTINUED] dextromethorphan-guaiFENesin  mg per 12 hr tablet 1 tablet    [DISCONTINUED] dextrose 10% bolus 125 mL 125 mL    [DISCONTINUED] dextrose 10% bolus 125 mL 125 mL    [DISCONTINUED] dextrose 10% bolus 250 mL 250 mL    [DISCONTINUED] dextrose 10% bolus 250 mL 250 mL    [DISCONTINUED] glucagon (human recombinant) injection 1 mg    [DISCONTINUED] glucagon (human recombinant) injection 1 mg    [DISCONTINUED] glucose chewable tablet 16 g    [DISCONTINUED] glucose chewable tablet 16 g    [DISCONTINUED] glucose chewable tablet 24 g    [DISCONTINUED] glucose chewable tablet 24 g    [DISCONTINUED] hydrocodone-chlorpheniramine 10-8 mg/5 mL suspension 5 mL    [DISCONTINUED] insulin aspart U-100 pen 0-5 Units    [DISCONTINUED] melatonin tablet 6 mg    [DISCONTINUED] methylPREDNISolone sodium succinate injection 40 mg    [DISCONTINUED] metoprolol tartrate (LOPRESSOR) tablet 25 mg    [DISCONTINUED] ondansetron injection 4 mg    [DISCONTINUED] oseltamivir 6 mg/mL 30 mg    [DISCONTINUED] oseltamivir capsule 75 mg    [DISCONTINUED] tiZANidine tablet 2 mg     Current Outpatient Medications on File Prior to Encounter   Medication Sig    acetaminophen (TYLENOL) 500 MG tablet Take 2 tablets (1,000 mg total) by mouth every 6 (six) hours as needed.    albuterol-ipratropium (DUO-NEB) 2.5 mg-0.5 mg/3 mL nebulizer solution Take 3 mLs by nebulization every 6 (six) hours. Rescue    amLODIPine (NORVASC) 5 MG tablet Take 5 mg by mouth once daily.     apixaban (ELIQUIS) 5 mg Tab Take 1 tablet (5 mg total) by mouth 2 (two) times daily.    dextromethorphan-guaiFENesin  mg (MUCINEX DM)  mg per 12 hr tablet Take 1 tablet by mouth 2 (two) times daily. for 10 days    methylPREDNISolone sodium succinate (SOLU-MEDROL) 40 mg/mL SolR Inject 40 mg into the vein every 12 (twelve) hours.    metoprolol tartrate (LOPRESSOR) 25 MG tablet Take 1 tablet (25 mg total) by mouth 2 (two) times daily.    oseltamivir (TAMIFLU) 6 mg/mL SusR Take 5 mLs (30 mg total) by mouth once daily. for 5 days    0.9 % sodium chloride (SODIUM CHLORIDE 0.9%) solution Inject 125 mL/hr into the vein continuous.    benzonatate (TESSALON) 100 MG capsule Take 1 capsule (100 mg total) by mouth 3 (three) times daily as needed for Cough.    dextrose 5 % (D5W) SolP 250 mL with azithromycin 500 mg SolR 500 mg Inject 500 mg into the vein once daily.    dextrose 5 % in water (D5W) 5 % PgBk 50 mL with cefTRIAXone 1 gram SolR 1 g Inject 1 g into the vein every 12 (twelve) hours.    hydrocodone-chlorpheniramine (TUSSIONEX) 10-8 mg/5 mL suspension Take 5 mLs by mouth every 12 (twelve) hours as needed for Cough.    melatonin (MELATIN) 3 mg tablet Take 2 tablets (6 mg total) by mouth nightly as needed for Insomnia.    ondansetron 4 mg/2 mL Soln Inject 4 mg into the vein every 8 (eight) hours as needed.    tiZANidine (ZANAFLEX) 2 MG tablet Take 1 tablet (2 mg total) by mouth every 8 (eight) hours as needed.     Family History       Problem Relation (Age of Onset)    No Known Problems Mother, Father          Tobacco Use    Smoking status: Never    Smokeless tobacco: Never   Substance and Sexual Activity    Alcohol use: Not Currently    Drug use: Never    Sexual activity: Not Currently     Review of Systems   All other systems reviewed and are negative.  Objective:     Vital Signs (Most Recent):  Temp: 97.5 °F (36.4 °C) (02/04/23 0453)  Pulse: 80 (02/04/23 0453)  Resp: 20 (02/04/23  0453)  BP: 131/74 (02/04/23 0453)  SpO2: 95 % (02/04/23 0453) Vital Signs (24h Range):  Temp:  [96.9 °F (36.1 °C)-98.2 °F (36.8 °C)] 97.5 °F (36.4 °C)  Pulse:  [79-89] 80  Resp:  [18-20] 20  SpO2:  [92 %-95 %] 95 %  BP: (120-137)/(65-78) 131/74     Weight: 119.8 kg (264 lb 1.8 oz)  Body mass index is 36.84 kg/m².    Physical Exam  Vitals reviewed.   Constitutional:       General: He is not in acute distress.     Appearance: Normal appearance. He is obese. He is not ill-appearing, toxic-appearing or diaphoretic.   HENT:      Head: Normocephalic and atraumatic.      Right Ear: External ear normal.      Left Ear: External ear normal.      Nose: Nose normal. No congestion or rhinorrhea.      Mouth/Throat:      Mouth: Mucous membranes are moist.      Pharynx: Oropharynx is clear. No oropharyngeal exudate or posterior oropharyngeal erythema.   Eyes:      General: No scleral icterus.     Extraocular Movements: Extraocular movements intact.      Conjunctiva/sclera: Conjunctivae normal.      Pupils: Pupils are equal, round, and reactive to light.   Neck:      Vascular: No carotid bruit.   Cardiovascular:      Rate and Rhythm: Normal rate and regular rhythm.      Pulses: Normal pulses.      Heart sounds: Normal heart sounds. No murmur heard.    No friction rub. No gallop.   Pulmonary:      Effort: Pulmonary effort is normal. No respiratory distress.      Breath sounds: Normal breath sounds. No stridor. No wheezing, rhonchi or rales.   Chest:      Chest wall: No tenderness.   Abdominal:      General: Abdomen is flat. Bowel sounds are normal. There is no distension.      Palpations: Abdomen is soft. There is no mass.      Tenderness: There is no abdominal tenderness. There is no guarding or rebound.      Hernia: No hernia is present.   Genitourinary:     Comments: Lee catheter in place  Musculoskeletal:         General: No swelling, tenderness, deformity or signs of injury. Normal range of motion.      Cervical back: Normal  range of motion and neck supple. No rigidity or tenderness.   Lymphadenopathy:      Cervical: No cervical adenopathy.   Skin:     General: Skin is warm and dry.      Capillary Refill: Capillary refill takes less than 2 seconds.      Coloration: Skin is not jaundiced or pale.      Findings: No bruising, erythema, lesion or rash.   Neurological:      General: No focal deficit present.      Mental Status: He is alert and oriented to person, place, and time. Mental status is at baseline.      Cranial Nerves: No cranial nerve deficit.      Sensory: No sensory deficit.      Motor: No weakness.      Coordination: Coordination normal.   Psychiatric:         Mood and Affect: Mood normal.         Behavior: Behavior normal.         Thought Content: Thought content normal.         Judgment: Judgment normal.         CRANIAL NERVES     CN III, IV, VI   Pupils are equal, round, and reactive to light.     Significant Labs: All pertinent labs within the past 24 hours have been reviewed.    Significant Imaging: I have reviewed all pertinent imaging results/findings within the past 24 hours.    LABS:  Recent Results (from the past 24 hour(s))   Comprehensive Metabolic Panel    Collection Time: 02/03/23  5:36 AM   Result Value Ref Range    Sodium Level 133 (L) 135 - 145 mmol/L    Potassium Level 4.7 3.5 - 5.1 mmol/L    Chloride 101 98 - 110 mmol/L    Carbon Dioxide 16 (L) 21 - 32 mmol/L    Glucose Level 409 (H) 70 - 115 mg/dL    Blood Urea Nitrogen 102.0 (H) 7.0 - 20.0 mg/dL    Creatinine 7.38 (H) 0.66 - 1.25 mg/dL    Calcium Level Total 7.0 (L) 8.4 - 10.2 mg/dL    Protein Total 5.6 (L) 6.3 - 8.2 gm/dL    Albumin Level 3.1 (L) 3.4 - 5.0 g/dL    Globulin 2.5 2.0 - 3.9 gm/dL    Albumin/Globulin Ratio 1.2 ratio    Bilirubin Total 0.8 0.0 - 1.0 mg/dL    Alkaline Phosphatase 71 50 - 144 unit/L    Alanine Aminotransferase 73 (H) 1 - 45 unit/L    Aspartate Aminotransferase 123 (H) 17 - 59 unit/L    eGFR 8 mls/min/1.73/m2    Anion Gap 16.0 (H)  2.0 - 13.0 mEq/L    BUN/Creatinine Ratio 14 12 - 20   Hemoglobin A1C    Collection Time: 02/03/23  5:36 AM   Result Value Ref Range    Hemoglobin A1c 7.8 (H) 4.0 - 6.0 %    Estimated Average Glucose 177.2 (H) 70.0 - 115.0 mg/dL   CBC with Differential    Collection Time: 02/03/23  5:36 AM   Result Value Ref Range    WBC 4.8 4.0 - 11.5 x10(3)/mcL    RBC 4.22 4.00 - 6.00 x10(6)/mcL    Hgb 12.2 (L) 13.0 - 18.0 gm/dL    Hct 36.0 36.0 - 52.0 %    MCV 85.3 79.0 - 99.0 fL    MCH 28.9 27.0 - 34.0 pg    MCHC 33.9 31.0 - 37.0 g/dL    RDW 13.2 11.6 - 14.4 %    Platelet 134 (L) 140 - 371 x10(3)/mcL    MPV 10.1 9.4 - 12.4 fL    IG# 0.05 (H) 0.0001 - 0.031 x10(3)/mcL    IG% 1.1 (H) 0 - 0.5 %    NRBC% 0.0 0 - 1 %   Manual Differential    Collection Time: 02/03/23  5:36 AM   Result Value Ref Range    Neut Man 68 47 - 80 %    Band Neutrophil Man 1 0 - 11 %    Lymph Man 15 13 - 40 %    Monocyte Man 14 (H) 2 - 11 %    Abn Lymph Man 2 %    Abs Neut calc 3.312 2.1 - 9.2 x10(3)/mcL    Abs Mono 0.672 0.1 - 1.3 x10(3)/mcL    Abs Lymp 0.72 0.6 - 4.6 x10(3)/mcL    Platelet Est Decreased (A) Normal, Adequate   POCT glucose    Collection Time: 02/03/23  9:15 AM   Result Value Ref Range    POCT Glucose 477 (HH) 70 - 110 mg/dL   Urinalysis, Reflex to Urine Culture    Collection Time: 02/03/23  9:33 AM    Specimen: Urine   Result Value Ref Range    Color, UA Yellow Yellow, Light-Yellow, Dark Yellow, Agnieszka, Straw    Appearance, UA Clear Clear    Specific Gravity, UA 1.015     pH, UA 5.5 5.0 - 8.5    Protein, UA 30 (A) Negative mg/dL    Glucose, UA >=1000 (A) Negative, Normal mg/dL    Ketones, UA Negative Negative mg/dL    Blood, UA Large (A) Negative unit/L    Bilirubin, UA Negative Negative mg/dL    Urobilinogen, UA 0.2 0.2, 1.0, Normal mg/dL    Nitrites, UA Negative Negative    Leukocyte Esterase, UA Negative Negative unit/L   Urinalysis, Microscopic    Collection Time: 02/03/23  9:33 AM   Result Value Ref Range    Bacteria, UA 4+ (A) None Seen,  Rare, Occasional /HPF    UA Additional Findings Starch crystal rare     RBC, UA 0-5 None Seen, 0-2, 3-5, 0-5 /HPF    WBC, UA 11-20 (A) None Seen, 0-2, 3-5, 0-5 /HPF    Squamous Epithelial Cells, UA Few (A) None Seen, Rare, Occasional, Occ /HPF   POCT glucose    Collection Time: 02/03/23 12:42 PM   Result Value Ref Range    POCT Glucose >500 (H) 70 - 110 mg/dL   COVID-19 Rapid Screening    Collection Time: 02/03/23  8:43 PM   Result Value Ref Range    SARS COV-2 MOLECULAR Negative Negative   POCT glucose    Collection Time: 02/04/23  1:33 AM   Result Value Ref Range    POCT Glucose >500 (H) 70 - 110 mg/dL   CBC with Automated Differential    Collection Time: 02/04/23  3:21 AM   Result Value Ref Range    WBC 8.58 3.90 - 12.70 K/uL    RBC 4.48 (L) 4.60 - 6.20 M/uL    Hemoglobin 12.9 (L) 14.0 - 18.0 g/dL    Hematocrit 38.3 (L) 40.0 - 54.0 %    MCV 86 82 - 98 fL    MCH 28.8 27.0 - 31.0 pg    MCHC 33.7 32.0 - 36.0 g/dL    RDW 13.0 11.5 - 14.5 %    Platelets 157 150 - 450 K/uL    MPV 10.3 9.2 - 12.9 fL    Immature Granulocytes 2.8 (H) 0.0 - 0.5 %    Gran # (ANC) 6.1 1.8 - 7.7 K/uL    Immature Grans (Abs) 0.24 (H) 0.00 - 0.04 K/uL    Lymph # 1.3 1.0 - 4.8 K/uL    Mono # 1.0 0.3 - 1.0 K/uL    Eos # 0.0 0.0 - 0.5 K/uL    Baso # 0.02 0.00 - 0.20 K/uL    nRBC 0 0 /100 WBC    Gran % 71.0 38.0 - 73.0 %    Lymph % 14.6 (L) 18.0 - 48.0 %    Mono % 11.4 4.0 - 15.0 %    Eosinophil % 0.0 0.0 - 8.0 %    Basophil % 0.2 0.0 - 1.9 %    Platelet Estimate Appears normal     Aniso Slight     Poik Slight     Ovalocytes Occasional     Tear Drop Cells Moderate     Bogdan Cells Occasional     Large/Giant Platelets Absent     Differential Method Automated    Comprehensive Metabolic Panel (CMP)    Collection Time: 02/04/23  3:21 AM   Result Value Ref Range    Sodium 132 (L) 136 - 145 mmol/L    Potassium 4.2 3.5 - 5.1 mmol/L    Chloride 104 95 - 110 mmol/L    CO2 11 (L) 23 - 29 mmol/L    Glucose 592 (HH) 70 - 110 mg/dL     (H) 8 - 23 mg/dL     Creatinine 7.6 (H) 0.5 - 1.4 mg/dL    Calcium 8.0 (L) 8.7 - 10.5 mg/dL    Total Protein 6.0 6.0 - 8.4 g/dL    Albumin 2.5 (L) 3.5 - 5.2 g/dL    Total Bilirubin 0.4 0.1 - 1.0 mg/dL    Alkaline Phosphatase 67 55 - 135 U/L    AST 64 (H) 10 - 40 U/L    ALT 59 (H) 10 - 44 U/L    Anion Gap 17 (H) 8 - 16 mmol/L    eGFR 7 (A) >60 mL/min/1.73 m^2   POCT glucose    Collection Time: 02/04/23  4:47 AM   Result Value Ref Range    POCT Glucose >500 (H) 70 - 110 mg/dL       RADIOLOGY  X-Ray Chest PA And Lateral    Result Date: 2/3/2023  EXAMINATION: STUDY: XR CHEST PA AND LATERAL CLINICAL HISTORY AND TECHNIQUE: Emerson Olguin, RT on 2/3/2023  5:34 AM CLINICAL HX: IN PT  X 3 DAYS - C/O SOB AND EPIGASTRIC PAIN  FLU POSITIVE - R/O PNEUMONIA PAST MEDICAL HX: CV(-),  COVID-19 HX, N/A TECHNIQUE: 2V CHEST TECH: RW COMPARISON: 02/01/2023 FINDINGS: The cardiac and mediastinal contours are unremarkable.  Pulmonary vasculature is congested with increased interstitial lung markings as seen previously.The previously noted consolidation within the right lower lung field has improved considerably.No significant pleural effusions are noted.Mild-to-moderate degenerative changes are noted throughout the thoracic spine.     1. There is overall improvement when compared to the previous examination of 02/01/2023.  See above comments. Electronically signed by: Nilay Boateng Date:    02/03/2023 Time:    05:50    Chest X-ray, PA And Lateral    Result Date: 2/2/2023  EXAMINATION: STUDY: XR CHEST PA AND LATERAL CLINICAL HISTORY AND TECHNIQUE: Bala Duron, RT on 2/1/2023  6:33 PM CLINICAL HX: IN PT  (DIRECT ADMIT) X THIS AM - C/O SOB AND EPIGASTRIC PAIN - R/O PNEUMONIA PAST MEDICAL HX: CV(-),  COVID-19 HX, N/A TECHNIQUE: 2V CHEST TECH: NN PT TRANSPORTED WO INCIDENT COMPARISON: 01/02/2022 FINDINGS: The cardiac and mediastinal contours appear unremarkable.The lungs are under expanded with coarse interstitial lung markings noted within both lower lung  field and patchy clouding noted within the right lower lung field.No significant pleural effusions are noted.Mild-to-moderate degenerative changes are noted throughout the thoracic spine.     1. The lungs are under expanded with coarse interstitial lung markings noted within both lower lung field and patchy clouding noted within the right lower lung field.The changes may simply represent subsegmental atelectasis, however, I suspect there is an early pneumonic infiltrate within the right lower lobe and clinical correlation is recommended. Electronically signed by: Nilay Boateng Date:    02/02/2023 Time:    05:06    Echo    Result Date: 2/2/2023  · The left ventricle is normal in size with normal systolic function. LVEF 60-65% · Normal left ventricular diastolic function. · Normal right ventricular size with normal right ventricular systolic function. · Less than mild valvular stenosis/regurgitation.      CT Abdomen Pelvis  Without Contrast    Result Date: 2/2/2023  EXAMINATION: STUDY:CT ABDOMEN PELVIS WITHOUT CONTRAST CLINICAL HISTORY AND TECHNIQUE: Corina Rocha RT on 2/2/2023  8:52 AM PT STATUS:  PROCEDURE: CT ABD/PELVIS WO CLINICAL HX: ADMITTED FOR ACUTE BACTERIAL PNEUMONIA LOWER ABD. PAIN & LOSS OF APPETITE X'S 1 WEEK WITH ABD. PAIN WORSENING IN PAST 2 DAYS. ACUTE KIDNEY DISEASE. PMH: CV-, HTN, KIDNEY STONES IV CONTRAST: NONE ORAL CONTRAST: NONE RECTAL CONTRAST: NONE AXIAL IMAGES @ 5MM INTERVALS WITH MULTIPLANAR RECONSTRUCTION TOTAL IMAGE NUMBER: [id number] NUMBER OF CT SCANS IN PAST 12 MONTHS: 2 CTDIvol(mGy): HEAD:     BODY: 11.3 DLP(mGycm): HEAD:     BODY: 821.1 TECH: [facility] PT TRANSPORTED W/O INCIDENT This patient has had 2 CT scans performed within the last 12 months. The following DOSE REDUCTION TECHNIQUES are used for all CT scans at [facility]: 1. Automated exposure control. 2. Adjustment of the mA and/or kv according to patient size. 3. Use of iterative reconstruction technique.  COMPARISON: None FINDINGS: Liver: No clinically significant abnormalities are noted. Gallbladder/biliary system: No clinically significant abnormalities are noted. Spleen: No clinically significant abnormalities are noted. Adrenal glands: A 2.1 cm, hypodense nodule is noted originating from the left adrenal gland. Pancreas: No clinically significant abnormalities are noted. Kidneys/ureters: A 3 mm, nonobstructing stone/nephrolithiasis is noted centrally within the left kidney.  I see no obvious ureteral stones or evidence of ureteral obstruction, however, it should be noted that the pelvic structures are obscured by streak artifact from the patient's left-sided hip prosthetic. Urinary bladder: No clinically significant abnormalities are noted. Prostate gland/uterus and ovaries: Prostate gland measures between 5.5 and 6 cm in greatest diameter with no worrisome focal abnormalities appreciated. GI tract: Unopacified loops of large and small bowel as well as the gastric lumen and appendix are difficult to evaluate with no definite abnormalities appreciated. Vascular structures: Moderate atherosclerotic plaquing is noted throughout the abdominal aorta and its primary branches. Musculoskeletal structures: There is mild demineralization of the skeletal structures with a mild-to-moderate S-shaped scoliotic curvature of the thoracolumbar spine and at least moderate degenerative changes noted throughout the lumbar spine.  The patient is post left hip prosthetic with significant streak artifact obscuring fine detail within the pelvis. Miscellaneous: N/A     1. Bibasilar consolidation is noted within both lung bases (more pronounced on the right) and is suspicious for bibasilar pneumonic infiltrates. 2. A 2.5-3 cm, round, noncalcified nodule is noted posteriorly within the left lung base and is suspicious for a neoplastic/metastatic process. 3. A 2.1 cm, hypodense nodule is noted originating from the left adrenal gland.  This  does not appear particularly worrisome and I suspect represents a benign adenoma. 4. Left-sided, nonobstructing nephrolithiasis as described above. 5. The prostate gland is enlarged (5.5-6 cm).  I suspect the changes are related to BPH, however, correlation with the patient's physical examination and PSA is recommended. 6. Chronic changes are present as described above. Electronically signed by: Nilay Boateng Date:    02/02/2023 Time:    09:02      EKG    MICROBIOLOGY    [facility]      Assessment/Plan:     Influenza A        Pneumonia  Patient diagnosed with bilateral lower lobe pneumonia at outside facility and was initiated on Rocephin and azithromycin.  Patient remains afebrile without leukocytosis and currently saturating 95% on 3 L via nasal cannula in no acute distress without use of accessory muscles noted.  Patient currently speaking in full sentences.  Blood cultures negative to date.  Patient COVID negative but influenza A positive.  Currently on Tamiflu.  Plan:  -continue antibiotics and Tamiflu  -f/u cultures  -Tylenol p.r.n. for fever  -continue antiemetics and antitussives p.r.n.  -discontinue steroids  -titrate oxygen therapy as needed  -incentive spirometry  -continuous pulse ox  -duo nebs p.r.n.      Acute renal failure  Patient found to have worsening renal function despite fluid resuscitation and avoidance of nephrotoxins with creatinine/GFR now measuring 12/7 respectfully.  Patient reported normal renal function in no underlying kidney disease prior to admission.  Nephrology consulted and awaiting further evaluation/recommendations.  Patient initiated on sodium bicarb and half-normal saline fluids.  Plan:  -continue IVFs  -monitor renal function  -avoid nephrotoxins  -renally dose medications  -f/u nephrology      Lung nodule  Patient found to have lung nodule on recent imaging which was noted back in March 2021 and currently follows Dr. Jimenez in [place] from pulmonology.  Plan:  -f/u outpatient  with pulmonology as directed         HTN (hypertension)  Currently normotensive.   Plan:  -Optimize pain control   -Continue home metoprolol and amlodipine.  Hold Ace/Arb in setting of TRENT/renal failure    -Monitor BP  -Low salt/renal diet  -IV hydralazine prn for SBP>160 or DBP>90         Chronic a-fib  Patient with Long standing persistent (>12 months) atrial fibrillation which is controlled currently with Beta Blocker. Patient is currently in sinus rhythm.[id number] Score: 1. Anticoagulation indicated. Anticoagulation done with Eliquis outpatient but currently on Lovenox due to renal function..        Hyperlipidemia  Patient is chronically on statin.will not continue for now due to elevated LFTs.. Last Lipid Panel: No results found for: CHOL, HDL, LDLCALC, TRIG, CHOLHDL  Plan:  -hold home medication for reasons noted above  -monitor LFTs  -low fat/low calorie diet      Class 2 severe obesity due to excess calories with serious comorbidity and body mass index (BMI) of 36.0 to 36.9 in adult  Body mass index is 36.84 kg/m². Elevation likely secondary to increased calorie intake and sedentary lifestyle. Patient educated on morbidity and mortality in regards to elevated BMI and stressed importance of diet and exercise.   Plan:  -low fat/low calorie diet       ELENA (obstructive sleep apnea)  Currently on CPAP outpatient.  Plan:  -continue CPAP q.h.s.      Type 2 diabetes mellitus, without long-term current use of insulin  Patient's FSGs are uncontrolled due to hyperglycemia on current medication regimen.  Last A1c reviewed-   Lab Results   Component Value Date    HGBA1C 7.8 (H) 02/03/2023     Most recent fingerstick glucose reviewed-   Recent Labs   Lab 02/03/23  0915 02/03/23  1242 02/04/23  0133 02/04/23  0447   POCTGLUCOSE 477* >500* >500* >500*     Current correctional scale  Low  titrate as needed anti-hyperglycemic dose as follows-   Antihyperglycemics (From admission, onward)    Start     Stop Route Frequency  Ordered    02/04/23 0348  insulin aspart U-100 pen 1-10 Units         -- SubQ Before meals & nightly PRN 02/04/23 0248      Plan:  -SSI  -discontinue steroids due to worsening hyperglycemia  -hypoglycemic protocol  -patient will likely need diabetic regimen upon hospital discharge    VTE Risk Mitigation (From admission, onward)         Ordered     enoxaparin injection 30 mg  Daily         02/04/23 0038     IP VTE HIGH RISK PATIENT  Once         02/04/23 0038     Place sequential compression device  Until discontinued         02/04/23 0038              //Core Measures   -DVT proph: SCDs, Lovenox   -Code status: Full    -Surrogate: none provided       Components of this note were documented using a voice recognition system and are subject to errors not corrected at the time the document was proof read. Please contact the author for any clarifications.       Juan Yanez MD  Department of Hospital Medicine   [facility] Telemetry ([facility])

## 2023-02-04 NOTE — ASSESSMENT & PLAN NOTE
-LFTs downtrending, may be in setting of viral URI. Abd imaging at [facility] shows no liver or gallbladder abnormalities  - monitor CMP

## 2023-02-04 NOTE — ASSESSMENT & PLAN NOTE
Patient is chronically on statin.will not continue for now due to elevated LFTs.. Last Lipid Panel: No results found for: CHOL, HDL, LDLCALC, TRIG, CHOLHDL  Plan:  -hold home medication for reasons noted above  -monitor LFTs  -low fat/low calorie diet

## 2023-02-04 NOTE — ASSESSMENT & PLAN NOTE
Patient's FSGs are uncontrolled due to hyperglycemia on current medication regimen.  Last A1c reviewed-   Lab Results   Component Value Date    HGBA1C 7.8 (H) 02/03/2023     Most recent fingerstick glucose reviewed-   Recent Labs   Lab 02/03/23  0915 02/03/23  1242 02/04/23  0133 02/04/23  0447   POCTGLUCOSE 477* >500* >500* >500*     Current correctional scale  Low  titrate as needed anti-hyperglycemic dose as follows-   Antihyperglycemics (From admission, onward)    Start     Stop Route Frequency Ordered    02/04/23 0348  insulin aspart U-100 pen 1-10 Units         -- SubQ Before meals & nightly PRN 02/04/23 0248      Plan:  -SSI  -discontinue steroids due to worsening hyperglycemia  -hypoglycemic protocol  -patient will likely need diabetic regimen upon hospital discharge

## 2023-02-04 NOTE — SUBJECTIVE & OBJECTIVE
Interval History: NAEON. Persistently hyperglycemic, was on steroids PTA which were discontinued. Pt awake, alert, in NAD. Denies CP, SOB. Has nonproductive cough. Denies abd pain, nausea, vomiting. Last BM was 5 days prior. Discussed with renal Dr. Byrd- no acute indication of urgent HD right now as pt mental status wnl and electrolytes ok. Will plan for renal biopsy on Monday 02/06    Review of Systems  Objective:     Vital Signs (Most Recent):  Temp: 97.9 °F (36.6 °C) (02/04/23 1151)  Pulse: 81 (02/04/23 1151)  Resp: 18 (02/04/23 1151)  BP: 134/73 (02/04/23 1151)  SpO2: 96 % (02/04/23 1151) Vital Signs (24h Range):  Temp:  [96.9 °F (36.1 °C)-97.9 °F (36.6 °C)] 97.9 °F (36.6 °C)  Pulse:  [78-89] 81  Resp:  [18-20] 18  SpO2:  [92 %-96 %] 96 %  BP: (131-152)/(65-78) 134/73     Weight: 119.8 kg (264 lb 1.8 oz)  Body mass index is 36.84 kg/m².    Intake/Output Summary (Last 24 hours) at 2/4/2023 1508  Last data filed at 2/4/2023 1200  Gross per 24 hour   Intake 240 ml   Output 2500 ml   Net -2260 ml      Physical Exam  Vitals and nursing note reviewed.   Constitutional:       General: He is not in acute distress.     Appearance: He is obese.   Cardiovascular:      Rate and Rhythm: Normal rate and regular rhythm.      Heart sounds: No murmur heard.    No friction rub. No gallop.   Pulmonary:      Effort: Pulmonary effort is normal.      Breath sounds: Normal breath sounds. No wheezing, rhonchi or rales.   Abdominal:      General: There is distension.      Palpations: Abdomen is soft.      Tenderness: There is no abdominal tenderness. There is no guarding or rebound.      Comments: Bowel sounds hypoactive    Musculoskeletal:      Right lower leg: No edema.      Left lower leg: No edema.   Neurological:      General: No focal deficit present.      Mental Status: He is alert and oriented to person, place, and time. Mental status is at baseline.       Significant Labs: All pertinent labs within the past 24 hours have  been reviewed.    Significant Imaging: I have reviewed all pertinent imaging results/findings within the past 24 hours.

## 2023-02-04 NOTE — SUBJECTIVE & OBJECTIVE
Past Medical History:   Diagnosis Date    High cholesterol     Hypertension        Past Surgical History:   Procedure Laterality Date    CORONARY ANGIOPLASTY WITH STENT PLACEMENT      REVISION TOTAL HIP ARTHROPLASTY Left        Review of patient's allergies indicates:  No Known Allergies    Current Facility-Administered Medications on File Prior to Encounter   Medication    [COMPLETED] insulin aspart U-100 pen 10 Units    [DISCONTINUED] 0.9%  NaCl infusion    [DISCONTINUED] acetaminophen tablet 1,000 mg    [DISCONTINUED] albuterol-ipratropium 2.5 mg-0.5 mg/3 mL nebulizer solution 3 mL    [DISCONTINUED] apixaban tablet 5 mg    [DISCONTINUED] atorvastatin tablet 40 mg    [DISCONTINUED] azithromycin (ZITHROMAX) 500 mg in dextrose 5 % (D5W) 250 mL IVPB (ADD-vantage)    [DISCONTINUED] azithromycin (ZITHROMAX) 500 mg in dextrose 5 % (D5W) 250 mL IVPB (Vial-Mate)    [DISCONTINUED] benzonatate capsule 100 mg    [DISCONTINUED] cefTRIAXone (ROCEPHIN) 1 g in dextrose 5 % in water (D5W) 5 % 50 mL IVPB (MB+)    [DISCONTINUED] dextromethorphan-guaiFENesin  mg per 12 hr tablet 1 tablet    [DISCONTINUED] dextrose 10% bolus 125 mL 125 mL    [DISCONTINUED] dextrose 10% bolus 125 mL 125 mL    [DISCONTINUED] dextrose 10% bolus 250 mL 250 mL    [DISCONTINUED] dextrose 10% bolus 250 mL 250 mL    [DISCONTINUED] glucagon (human recombinant) injection 1 mg    [DISCONTINUED] glucagon (human recombinant) injection 1 mg    [DISCONTINUED] glucose chewable tablet 16 g    [DISCONTINUED] glucose chewable tablet 16 g    [DISCONTINUED] glucose chewable tablet 24 g    [DISCONTINUED] glucose chewable tablet 24 g    [DISCONTINUED] hydrocodone-chlorpheniramine 10-8 mg/5 mL suspension 5 mL    [DISCONTINUED] insulin aspart U-100 pen 0-5 Units    [DISCONTINUED] melatonin tablet 6 mg    [DISCONTINUED] methylPREDNISolone sodium succinate injection 40 mg    [DISCONTINUED] metoprolol tartrate (LOPRESSOR) tablet 25 mg    [DISCONTINUED] ondansetron  injection 4 mg    [DISCONTINUED] oseltamivir 6 mg/mL 30 mg    [DISCONTINUED] oseltamivir capsule 75 mg    [DISCONTINUED] tiZANidine tablet 2 mg     Current Outpatient Medications on File Prior to Encounter   Medication Sig    acetaminophen (TYLENOL) 500 MG tablet Take 2 tablets (1,000 mg total) by mouth every 6 (six) hours as needed.    albuterol-ipratropium (DUO-NEB) 2.5 mg-0.5 mg/3 mL nebulizer solution Take 3 mLs by nebulization every 6 (six) hours. Rescue    amLODIPine (NORVASC) 5 MG tablet Take 5 mg by mouth once daily.    apixaban (ELIQUIS) 5 mg Tab Take 1 tablet (5 mg total) by mouth 2 (two) times daily.    dextromethorphan-guaiFENesin  mg (MUCINEX DM)  mg per 12 hr tablet Take 1 tablet by mouth 2 (two) times daily. for 10 days    methylPREDNISolone sodium succinate (SOLU-MEDROL) 40 mg/mL SolR Inject 40 mg into the vein every 12 (twelve) hours.    metoprolol tartrate (LOPRESSOR) 25 MG tablet Take 1 tablet (25 mg total) by mouth 2 (two) times daily.    oseltamivir (TAMIFLU) 6 mg/mL SusR Take 5 mLs (30 mg total) by mouth once daily. for 5 days    0.9 % sodium chloride (SODIUM CHLORIDE 0.9%) solution Inject 125 mL/hr into the vein continuous.    benzonatate (TESSALON) 100 MG capsule Take 1 capsule (100 mg total) by mouth 3 (three) times daily as needed for Cough.    dextrose 5 % (D5W) SolP 250 mL with azithromycin 500 mg SolR 500 mg Inject 500 mg into the vein once daily.    dextrose 5 % in water (D5W) 5 % PgBk 50 mL with cefTRIAXone 1 gram SolR 1 g Inject 1 g into the vein every 12 (twelve) hours.    hydrocodone-chlorpheniramine (TUSSIONEX) 10-8 mg/5 mL suspension Take 5 mLs by mouth every 12 (twelve) hours as needed for Cough.    melatonin (MELATIN) 3 mg tablet Take 2 tablets (6 mg total) by mouth nightly as needed for Insomnia.    ondansetron 4 mg/2 mL Soln Inject 4 mg into the vein every 8 (eight) hours as needed.    tiZANidine (ZANAFLEX) 2 MG tablet Take 1 tablet (2 mg total) by mouth every 8  (eight) hours as needed.     Family History       Problem Relation (Age of Onset)    No Known Problems Mother, Father          Tobacco Use    Smoking status: Never    Smokeless tobacco: Never   Substance and Sexual Activity    Alcohol use: Not Currently    Drug use: Never    Sexual activity: Not Currently     Review of Systems   All other systems reviewed and are negative.  Objective:     Vital Signs (Most Recent):  Temp: 97.5 °F (36.4 °C) (02/04/23 0453)  Pulse: 80 (02/04/23 0453)  Resp: 20 (02/04/23 0453)  BP: 131/74 (02/04/23 0453)  SpO2: 95 % (02/04/23 0453) Vital Signs (24h Range):  Temp:  [96.9 °F (36.1 °C)-98.2 °F (36.8 °C)] 97.5 °F (36.4 °C)  Pulse:  [79-89] 80  Resp:  [18-20] 20  SpO2:  [92 %-95 %] 95 %  BP: (120-137)/(65-78) 131/74     Weight: 119.8 kg (264 lb 1.8 oz)  Body mass index is 36.84 kg/m².    Physical Exam  Vitals reviewed.   Constitutional:       General: He is not in acute distress.     Appearance: Normal appearance. He is obese. He is not ill-appearing, toxic-appearing or diaphoretic.   HENT:      Head: Normocephalic and atraumatic.      Right Ear: External ear normal.      Left Ear: External ear normal.      Nose: Nose normal. No congestion or rhinorrhea.      Mouth/Throat:      Mouth: Mucous membranes are moist.      Pharynx: Oropharynx is clear. No oropharyngeal exudate or posterior oropharyngeal erythema.   Eyes:      General: No scleral icterus.     Extraocular Movements: Extraocular movements intact.      Conjunctiva/sclera: Conjunctivae normal.      Pupils: Pupils are equal, round, and reactive to light.   Neck:      Vascular: No carotid bruit.   Cardiovascular:      Rate and Rhythm: Normal rate and regular rhythm.      Pulses: Normal pulses.      Heart sounds: Normal heart sounds. No murmur heard.    No friction rub. No gallop.   Pulmonary:      Effort: Pulmonary effort is normal. No respiratory distress.      Breath sounds: Normal breath sounds. No stridor. No wheezing, rhonchi or  rales.   Chest:      Chest wall: No tenderness.   Abdominal:      General: Abdomen is flat. Bowel sounds are normal. There is no distension.      Palpations: Abdomen is soft. There is no mass.      Tenderness: There is no abdominal tenderness. There is no guarding or rebound.      Hernia: No hernia is present.   Genitourinary:     Comments: Lee catheter in place  Musculoskeletal:         General: No swelling, tenderness, deformity or signs of injury. Normal range of motion.      Cervical back: Normal range of motion and neck supple. No rigidity or tenderness.   Lymphadenopathy:      Cervical: No cervical adenopathy.   Skin:     General: Skin is warm and dry.      Capillary Refill: Capillary refill takes less than 2 seconds.      Coloration: Skin is not jaundiced or pale.      Findings: No bruising, erythema, lesion or rash.   Neurological:      General: No focal deficit present.      Mental Status: He is alert and oriented to person, place, and time. Mental status is at baseline.      Cranial Nerves: No cranial nerve deficit.      Sensory: No sensory deficit.      Motor: No weakness.      Coordination: Coordination normal.   Psychiatric:         Mood and Affect: Mood normal.         Behavior: Behavior normal.         Thought Content: Thought content normal.         Judgment: Judgment normal.         CRANIAL NERVES     CN III, IV, VI   Pupils are equal, round, and reactive to light.     Significant Labs: All pertinent labs within the past 24 hours have been reviewed.    Significant Imaging: I have reviewed all pertinent imaging results/findings within the past 24 hours.    LABS:  Recent Results (from the past 24 hour(s))   Comprehensive Metabolic Panel    Collection Time: 02/03/23  5:36 AM   Result Value Ref Range    Sodium Level 133 (L) 135 - 145 mmol/L    Potassium Level 4.7 3.5 - 5.1 mmol/L    Chloride 101 98 - 110 mmol/L    Carbon Dioxide 16 (L) 21 - 32 mmol/L    Glucose Level 409 (H) 70 - 115 mg/dL    Blood  Urea Nitrogen 102.0 (H) 7.0 - 20.0 mg/dL    Creatinine 7.38 (H) 0.66 - 1.25 mg/dL    Calcium Level Total 7.0 (L) 8.4 - 10.2 mg/dL    Protein Total 5.6 (L) 6.3 - 8.2 gm/dL    Albumin Level 3.1 (L) 3.4 - 5.0 g/dL    Globulin 2.5 2.0 - 3.9 gm/dL    Albumin/Globulin Ratio 1.2 ratio    Bilirubin Total 0.8 0.0 - 1.0 mg/dL    Alkaline Phosphatase 71 50 - 144 unit/L    Alanine Aminotransferase 73 (H) 1 - 45 unit/L    Aspartate Aminotransferase 123 (H) 17 - 59 unit/L    eGFR 8 mls/min/1.73/m2    Anion Gap 16.0 (H) 2.0 - 13.0 mEq/L    BUN/Creatinine Ratio 14 12 - 20   Hemoglobin A1C    Collection Time: 02/03/23  5:36 AM   Result Value Ref Range    Hemoglobin A1c 7.8 (H) 4.0 - 6.0 %    Estimated Average Glucose 177.2 (H) 70.0 - 115.0 mg/dL   CBC with Differential    Collection Time: 02/03/23  5:36 AM   Result Value Ref Range    WBC 4.8 4.0 - 11.5 x10(3)/mcL    RBC 4.22 4.00 - 6.00 x10(6)/mcL    Hgb 12.2 (L) 13.0 - 18.0 gm/dL    Hct 36.0 36.0 - 52.0 %    MCV 85.3 79.0 - 99.0 fL    MCH 28.9 27.0 - 34.0 pg    MCHC 33.9 31.0 - 37.0 g/dL    RDW 13.2 11.6 - 14.4 %    Platelet 134 (L) 140 - 371 x10(3)/mcL    MPV 10.1 9.4 - 12.4 fL    IG# 0.05 (H) 0.0001 - 0.031 x10(3)/mcL    IG% 1.1 (H) 0 - 0.5 %    NRBC% 0.0 0 - 1 %   Manual Differential    Collection Time: 02/03/23  5:36 AM   Result Value Ref Range    Neut Man 68 47 - 80 %    Band Neutrophil Man 1 0 - 11 %    Lymph Man 15 13 - 40 %    Monocyte Man 14 (H) 2 - 11 %    Abn Lymph Man 2 %    Abs Neut calc 3.312 2.1 - 9.2 x10(3)/mcL    Abs Mono 0.672 0.1 - 1.3 x10(3)/mcL    Abs Lymp 0.72 0.6 - 4.6 x10(3)/mcL    Platelet Est Decreased (A) Normal, Adequate   POCT glucose    Collection Time: 02/03/23  9:15 AM   Result Value Ref Range    POCT Glucose 477 (HH) 70 - 110 mg/dL   Urinalysis, Reflex to Urine Culture    Collection Time: 02/03/23  9:33 AM    Specimen: Urine   Result Value Ref Range    Color, UA Yellow Yellow, Light-Yellow, Dark Yellow, Agnieszka, Straw    Appearance, UA Clear Clear     Specific Gravity, UA 1.015     pH, UA 5.5 5.0 - 8.5    Protein, UA 30 (A) Negative mg/dL    Glucose, UA >=1000 (A) Negative, Normal mg/dL    Ketones, UA Negative Negative mg/dL    Blood, UA Large (A) Negative unit/L    Bilirubin, UA Negative Negative mg/dL    Urobilinogen, UA 0.2 0.2, 1.0, Normal mg/dL    Nitrites, UA Negative Negative    Leukocyte Esterase, UA Negative Negative unit/L   Urinalysis, Microscopic    Collection Time: 02/03/23  9:33 AM   Result Value Ref Range    Bacteria, UA 4+ (A) None Seen, Rare, Occasional /HPF    UA Additional Findings Starch crystal rare     RBC, UA 0-5 None Seen, 0-2, 3-5, 0-5 /HPF    WBC, UA 11-20 (A) None Seen, 0-2, 3-5, 0-5 /HPF    Squamous Epithelial Cells, UA Few (A) None Seen, Rare, Occasional, Occ /HPF   POCT glucose    Collection Time: 02/03/23 12:42 PM   Result Value Ref Range    POCT Glucose >500 (H) 70 - 110 mg/dL   COVID-19 Rapid Screening    Collection Time: 02/03/23  8:43 PM   Result Value Ref Range    SARS COV-2 MOLECULAR Negative Negative   POCT glucose    Collection Time: 02/04/23  1:33 AM   Result Value Ref Range    POCT Glucose >500 (H) 70 - 110 mg/dL   CBC with Automated Differential    Collection Time: 02/04/23  3:21 AM   Result Value Ref Range    WBC 8.58 3.90 - 12.70 K/uL    RBC 4.48 (L) 4.60 - 6.20 M/uL    Hemoglobin 12.9 (L) 14.0 - 18.0 g/dL    Hematocrit 38.3 (L) 40.0 - 54.0 %    MCV 86 82 - 98 fL    MCH 28.8 27.0 - 31.0 pg    MCHC 33.7 32.0 - 36.0 g/dL    RDW 13.0 11.5 - 14.5 %    Platelets 157 150 - 450 K/uL    MPV 10.3 9.2 - 12.9 fL    Immature Granulocytes 2.8 (H) 0.0 - 0.5 %    Gran # (ANC) 6.1 1.8 - 7.7 K/uL    Immature Grans (Abs) 0.24 (H) 0.00 - 0.04 K/uL    Lymph # 1.3 1.0 - 4.8 K/uL    Mono # 1.0 0.3 - 1.0 K/uL    Eos # 0.0 0.0 - 0.5 K/uL    Baso # 0.02 0.00 - 0.20 K/uL    nRBC 0 0 /100 WBC    Gran % 71.0 38.0 - 73.0 %    Lymph % 14.6 (L) 18.0 - 48.0 %    Mono % 11.4 4.0 - 15.0 %    Eosinophil % 0.0 0.0 - 8.0 %    Basophil % 0.2 0.0 - 1.9 %     Platelet Estimate Appears normal     Aniso Slight     Poik Slight     Ovalocytes Occasional     Tear Drop Cells Moderate     Pall Mall Cells Occasional     Large/Giant Platelets Absent     Differential Method Automated    Comprehensive Metabolic Panel (CMP)    Collection Time: 02/04/23  3:21 AM   Result Value Ref Range    Sodium 132 (L) 136 - 145 mmol/L    Potassium 4.2 3.5 - 5.1 mmol/L    Chloride 104 95 - 110 mmol/L    CO2 11 (L) 23 - 29 mmol/L    Glucose 592 (HH) 70 - 110 mg/dL     (H) 8 - 23 mg/dL    Creatinine 7.6 (H) 0.5 - 1.4 mg/dL    Calcium 8.0 (L) 8.7 - 10.5 mg/dL    Total Protein 6.0 6.0 - 8.4 g/dL    Albumin 2.5 (L) 3.5 - 5.2 g/dL    Total Bilirubin 0.4 0.1 - 1.0 mg/dL    Alkaline Phosphatase 67 55 - 135 U/L    AST 64 (H) 10 - 40 U/L    ALT 59 (H) 10 - 44 U/L    Anion Gap 17 (H) 8 - 16 mmol/L    eGFR 7 (A) >60 mL/min/1.73 m^2   POCT glucose    Collection Time: 02/04/23  4:47 AM   Result Value Ref Range    POCT Glucose >500 (H) 70 - 110 mg/dL       RADIOLOGY  X-Ray Chest PA And Lateral    Result Date: 2/3/2023  EXAMINATION: STUDY: XR CHEST PA AND LATERAL CLINICAL HISTORY AND TECHNIQUE: Emerson Olguin, RT on 2/3/2023  5:34 AM CLINICAL HX: IN PT  X 3 DAYS - C/O SOB AND EPIGASTRIC PAIN  FLU POSITIVE - R/O PNEUMONIA PAST MEDICAL HX: CV(-),  COVID-19 HX, N/A TECHNIQUE: 2V CHEST TECH: RW COMPARISON: 02/01/2023 FINDINGS: The cardiac and mediastinal contours are unremarkable.  Pulmonary vasculature is congested with increased interstitial lung markings as seen previously.The previously noted consolidation within the right lower lung field has improved considerably.No significant pleural effusions are noted.Mild-to-moderate degenerative changes are noted throughout the thoracic spine.     1. There is overall improvement when compared to the previous examination of 02/01/2023.  See above comments. Electronically signed by: Nilay Boateng Date:    02/03/2023 Time:    05:50    Chest X-ray, PA And Lateral    Result  Date: 2/2/2023  EXAMINATION: STUDY: XR CHEST PA AND LATERAL CLINICAL HISTORY AND TECHNIQUE: Bala Duron, RT on 2/1/2023  6:33 PM CLINICAL HX: IN PT  (DIRECT ADMIT) X THIS AM - C/O SOB AND EPIGASTRIC PAIN - R/O PNEUMONIA PAST MEDICAL HX: CV(-),  COVID-19 HX, N/A TECHNIQUE: 2V CHEST TECH: NN PT TRANSPORTED WO INCIDENT COMPARISON: 01/02/2022 FINDINGS: The cardiac and mediastinal contours appear unremarkable.The lungs are under expanded with coarse interstitial lung markings noted within both lower lung field and patchy clouding noted within the right lower lung field.No significant pleural effusions are noted.Mild-to-moderate degenerative changes are noted throughout the thoracic spine.     1. The lungs are under expanded with coarse interstitial lung markings noted within both lower lung field and patchy clouding noted within the right lower lung field.The changes may simply represent subsegmental atelectasis, however, I suspect there is an early pneumonic infiltrate within the right lower lobe and clinical correlation is recommended. Electronically signed by: Nilay Boateng Date:    02/02/2023 Time:    05:06    Echo    Result Date: 2/2/2023  · The left ventricle is normal in size with normal systolic function. LVEF 60-65% · Normal left ventricular diastolic function. · Normal right ventricular size with normal right ventricular systolic function. · Less than mild valvular stenosis/regurgitation.      CT Abdomen Pelvis  Without Contrast    Result Date: 2/2/2023  EXAMINATION: STUDY:CT ABDOMEN PELVIS WITHOUT CONTRAST CLINICAL HISTORY AND TECHNIQUE: Corina Rocha, RT on 2/2/2023  8:52 AM PT STATUS:  PROCEDURE: CT ABD/PELVIS WO CLINICAL HX: ADMITTED FOR ACUTE BACTERIAL PNEUMONIA LOWER ABD. PAIN & LOSS OF APPETITE X'S 1 WEEK WITH ABD. PAIN WORSENING IN PAST 2 DAYS. ACUTE KIDNEY DISEASE. PMH: CV-, HTN, KIDNEY STONES IV CONTRAST: NONE ORAL CONTRAST: NONE RECTAL CONTRAST: NONE AXIAL IMAGES @ 5MM INTERVALS WITH  MULTIPLANAR RECONSTRUCTION TOTAL IMAGE NUMBER: [id number] NUMBER OF CT SCANS IN PAST 12 MONTHS: 2 CTDIvol(mGy): HEAD:     BODY: 11.3 DLP(mGycm): HEAD:     BODY: 821.1 TECH: AKG PT TRANSPORTED W/O INCIDENT This patient has had 2 CT scans performed within the last 12 months. The following DOSE REDUCTION TECHNIQUES are used for all CT scans at [facility]: 1. Automated exposure control. 2. Adjustment of the mA and/or kv according to patient size. 3. Use of iterative reconstruction technique. COMPARISON: None FINDINGS: Liver: No clinically significant abnormalities are noted. Gallbladder/biliary system: No clinically significant abnormalities are noted. Spleen: No clinically significant abnormalities are noted. Adrenal glands: A 2.1 cm, hypodense nodule is noted originating from the left adrenal gland. Pancreas: No clinically significant abnormalities are noted. Kidneys/ureters: A 3 mm, nonobstructing stone/nephrolithiasis is noted centrally within the left kidney.  I see no obvious ureteral stones or evidence of ureteral obstruction, however, it should be noted that the pelvic structures are obscured by streak artifact from the patient's left-sided hip prosthetic. Urinary bladder: No clinically significant abnormalities are noted. Prostate gland/uterus and ovaries: Prostate gland measures between 5.5 and 6 cm in greatest diameter with no worrisome focal abnormalities appreciated. GI tract: Unopacified loops of large and small bowel as well as the gastric lumen and appendix are difficult to evaluate with no definite abnormalities appreciated. Vascular structures: Moderate atherosclerotic plaquing is noted throughout the abdominal aorta and its primary branches. Musculoskeletal structures: There is mild demineralization of the skeletal structures with a mild-to-moderate S-shaped scoliotic curvature of the thoracolumbar spine and at least moderate degenerative changes noted throughout the lumbar spine.  The  patient is post left hip prosthetic with significant streak artifact obscuring fine detail within the pelvis. Miscellaneous: N/A     1. Bibasilar consolidation is noted within both lung bases (more pronounced on the right) and is suspicious for bibasilar pneumonic infiltrates. 2. A 2.5-3 cm, round, noncalcified nodule is noted posteriorly within the left lung base and is suspicious for a neoplastic/metastatic process. 3. A 2.1 cm, hypodense nodule is noted originating from the left adrenal gland.  This does not appear particularly worrisome and I suspect represents a benign adenoma. 4. Left-sided, nonobstructing nephrolithiasis as described above. 5. The prostate gland is enlarged (5.5-6 cm).  I suspect the changes are related to BPH, however, correlation with the patient's physical examination and PSA is recommended. 6. Chronic changes are present as described above. Electronically signed by: Nilay Boateng Date:    02/02/2023 Time:    09:02      EKG    MICROBIOLOGY    MDM

## 2023-02-04 NOTE — HOSPITAL COURSE
Pt admitted as a transfer overnight on 02/03 from OSH for management of acute renal failure. Steroids discontinued on admission due to hyperglycemia. PTA antibiotics of Rocephin/Azithromycin continued. Pt started on IV sodium bicarb for metabolic acidosis in setting of renal failure. Nephrology consulted for evaluation. Renal ultrasound shows slightly large kidneys particularly on the left 13 cm.  At this point the cause of his acute kidney injury is not clear, possibly AIN in setting of azithro use versus intermittent partial obstruction secondary to extensive Benadryl use causing symptoms of prostatism. Initially planned for renal biopsy, however this was subsequently canceled with patient's agreement after further discussions with him regarding risks versus benefits as his creatinine continued to improve.  Less likely to be vasculitis or autoimmune process as renal function improved without initiation of any immunosuppressive therapy.  Nephrology recommend removal of Lee catheter to make sure he can void appropriately.  Lee catheter removed on 02/07/2023, patient was able to void without difficulty.  On the day of discharge, patient's creatinine had decreased further to 2.7.  He was made follow-up appointment with his PCP, Dr. Vidales for Monday 02/13/2023 and instructed to have repeat BMP done and further discussion regarding initiation of diabetic regimen.  PCP's nurse notified personally regarding these requests.  Referral initiated for patient to establish with Nephrology in [facility].  Seen and examined with nephrology on the day of discharge.  Patient agreeable with discharge plan.  All questions answered to his satisfaction.  Stable for discharge home at this time.

## 2023-02-04 NOTE — PLAN OF CARE
[facility] - Telemetry (Hospital)  Initial Discharge Assessment       Primary Care Provider: Andrey Vidales MD    Admission Diagnosis: TRENT (acute kidney injury) [N17.9]    Admission Date: 2/3/2023  Expected Discharge Date:     Discharge Barriers Identified: None    Payor: MEDICAID / Plan: AETNA [facility] / Product Type: Managed Medicaid /     Extended Emergency Contact Information  Primary Emergency Contact: NIKOS HERNANDEZ  Mobile Phone: [phone]  Relation: Daughter  Preferred language: English   needed? No    Discharge Plan A: Home         [facility] [place] [address]  [address] [facility] [address]  Phone: [phone] Fax: [phone]      Initial Assessment (most recent)       Adult Discharge Assessment - 02/04/23 1055          Discharge Assessment    Assessment Type Discharge Planning Assessment     Confirmed/corrected address, phone number and insurance Yes     Confirmed Demographics Correct on Facesheet     Source of Information patient     Does patient/caregiver understand observation status Yes     Communicated BARBER with patient/caregiver Date not available/Unable to determine     Reason For Admission acute kidney innjury     People in Home alone     Do you expect to return to your current living situation? Yes     Do you have help at home or someone to help you manage your care at home? No     Prior to hospitilization cognitive status: Alert/Oriented     Current cognitive status: Alert/Oriented     Equipment Currently Used at Home none     Readmission within 30 days? No     Patient currently being followed by outpatient case management? No     Do you currently have service(s) that help you manage your care at home? No     Do you take prescription medications? Yes     Do you have prescription coverage? Yes     Do you have any problems affording any of your prescribed medications? No     Is the patient taking medications as prescribed? yes      Who is going to help you get home at discharge? family     How do you get to doctors appointments? car, drives self     Are you on dialysis? No     Do you take coumadin? No     Discharge Plan A Home     DME Needed Upon Discharge  none     Discharge Plan discussed with: Patient     Discharge Barriers Identified None                   SW spoke with pt. SW explained role. Pt reports he lives alone and is independent with his ADLs. Pt has no discharge needs at this time.

## 2023-02-04 NOTE — ASSESSMENT & PLAN NOTE
Patient with Long standing persistent (>12 months) atrial fibrillation which is controlled currently with Beta Blocker. Patient is currently in sinus rhythm.[id number] Score: 1. Anticoagulation indicated. Anticoagulation done with Eliquis outpatient but currently on Lovenox due to renal function..

## 2023-02-04 NOTE — NURSING
RECEIVED CALL FROM BETZY BARNETT [facility] WITH BED ASSIGNMENT TO [facility].  REPORT TO BE CALLED [phone] AND ACCEPTING PHYSICIAN WILL BE DR. JEANETTE BARGER

## 2023-02-04 NOTE — ASSESSMENT & PLAN NOTE
- Recevied rocephin + Azithro at OSH as CXR showed RLL infiltrate vs. Atelectasis  - continue Rocephin (day 4)   - stop Azithro   - Wean o2 as tolerated to maintain sats > 90%

## 2023-02-04 NOTE — ASSESSMENT & PLAN NOTE
Patient with Long standing persistent (>12 months) atrial fibrillation which is controlled currently with Beta Blocker. Patient is currently in sinus rhythm.[id number] Score: 1. Anticoagulation indicated. Anticoagulation done with Eliquis outpatient but currently on Lovenox due to renal function..  - continue Lopressor   - continue therapeutic lovenox (renal dosing)   - lovenox will need to be stopped 24 hrs prior to biopsy due to Cr clearance per pharmacy

## 2023-02-04 NOTE — CONSULTS
[facility])  Nephrology  Consult Note    Patient Name: Dereje Aguirre  MRN: [id number]  Admission Date: 2/3/2023  Hospital Length of Stay: 1 days  Attending Provider: Clara Dhillon MD   Primary Care Physician: Andrey Vidales MD  Principal Problem:<principal problem not specified>    Inpatient consult to Nephrology  Consult performed by: Hunter Byrd MD  Consult ordered by: Juan Yanez MD  Reason for consult: TRENT      Subjective:     HPI:  62-year-old male transferred from an [facility] facility secondary to rapidly progressive acute kidney injury.  Nephrology has been consulted for evaluation.  The patient was seen in his hospital room.  In bed resting comfortably.  No acute distress noted.  He states that Monday of last week he was started on antibiotics for persistent cough and upper respiratory infection.  Antibiotics including azithromycin and Augmentin.  He states that his symptoms worsened and he was hospitalized at an [facility] facility on Wednesday February 1st.  On review of laboratory studies it appears that his presenting creatinine was around 4.  Old records were reviewed showing his creatinine was around 1.1 and September of last year.  Despite medical management his creatinine was noted to increase to over 7 and he was transferred to [facility].    He had a Lee catheter placed at the [facility] with returned about 800 cc urine.  He states that he did not have any sensation eating urinate prior to the catheter being placed.    Other than recent antibiotic exposure no other medications reported.  No over-the-counter supplements reported.  He does not take nonsteroidal anti-inflammatory agents.  He is not on a statin.  No fevers or chills related.  No rashes or oral ulcers.      Past Medical History:   Diagnosis Date    High cholesterol     Hypertension        Past Surgical History:   Procedure Laterality Date    CORONARY ANGIOPLASTY WITH STENT PLACEMENT       REVISION TOTAL HIP ARTHROPLASTY Left        Review of patient's allergies indicates:  No Known Allergies  Current Facility-Administered Medications   Medication Frequency    acetaminophen suppository 650 mg Q6H PRN    acetaminophen tablet 650 mg Q8H PRN    albuterol-ipratropium 2.5 mg-0.5 mg/3 mL nebulizer solution 3 mL Q6H PRN    amLODIPine tablet 5 mg Daily    azithromycin (ZITHROMAX) 500 mg in dextrose 5 % (D5W) 250 mL IVPB (Vial-Mate) Q24H    cefTRIAXone (ROCEPHIN) 1 g in dextrose 5 % in water (D5W) 5 % 50 mL IVPB (MB+) Q24H    dextromethorphan-guaiFENesin  mg per 12 hr tablet 1 tablet BID    dextrose 10% bolus 125 mL 125 mL PRN    dextrose 10% bolus 250 mL 250 mL PRN    enoxaparin injection 30 mg Daily    glucagon (human recombinant) injection 1 mg PRN    glucose chewable tablet 16 g PRN    glucose chewable tablet 24 g PRN    insulin aspart U-100 pen 0-15 Units Q6H PRN    melatonin tablet 6 mg Nightly PRN    metoprolol tartrate (LOPRESSOR) tablet 25 mg BID    mupirocin 2 % ointment BID    naloxone 0.4 mg/mL injection 0.02 mg PRN    ondansetron injection 4 mg Q8H PRN    oseltamivir capsule 30 mg Daily    promethazine tablet 25 mg Q6H PRN    senna-docusate 8.6-50 mg per tablet 1 tablet BID PRN    sodium bicarbonate 75 mEq in sodium chloride 0.45% 1,075 mL infusion Continuous    sodium chloride 0.9% flush 3 mL Q12H PRN     Family History       Problem Relation (Age of Onset)    No Known Problems Mother, Father          Tobacco Use    Smoking status: Never    Smokeless tobacco: Never   Substance and Sexual Activity    Alcohol use: Not Currently    Drug use: Never    Sexual activity: Not Currently     Review of Systems   Constitutional:  Positive for fatigue.   HENT: Negative.     Eyes: Negative.    Respiratory:  Positive for cough.    Cardiovascular: Negative.    Gastrointestinal: Negative.    Genitourinary: Negative.    Musculoskeletal: Negative.    Neurological: Negative.    Objective:     Vital Signs  (Most Recent):  Temp: 97 °F (36.1 °C) (02/04/23 0824)  Pulse: 78 (02/04/23 0824)  Resp: 18 (02/04/23 0824)  BP: (!) 152/73 (02/04/23 0824)  SpO2: (!) 93 % (02/04/23 0824)   Vital Signs (24h Range):  Temp:  [96.9 °F (36.1 °C)-97.9 °F (36.6 °C)] 97 °F (36.1 °C)  Pulse:  [78-89] 78  Resp:  [18-20] 18  SpO2:  [92 %-95 %] 93 %  BP: (124-152)/(65-78) 152/73     Weight: 119.8 kg (264 lb 1.8 oz) (02/03/23 2345)  Body mass index is 36.84 kg/m².  Body surface area is 2.45 meters squared.    I/O last 3 completed shifts:  In: -   Out: 1300 [Urine:1300]    Physical Exam  Constitutional:       Appearance: Normal appearance.   HENT:      Head: Normocephalic and atraumatic.   Eyes:      General: No scleral icterus.     Extraocular Movements: Extraocular movements intact.      Pupils: Pupils are equal, round, and reactive to light.   Cardiovascular:      Rate and Rhythm: Normal rate and regular rhythm.   Pulmonary:      Effort: Pulmonary effort is normal.      Breath sounds: Normal breath sounds.   Musculoskeletal:      Right lower leg: No edema.      Left lower leg: No edema.   Skin:     General: Skin is warm and dry.   Neurological:      General: No focal deficit present.      Mental Status: He is alert and oriented to person, place, and time.   Psychiatric:         Mood and Affect: Mood normal.         Behavior: Behavior normal.       Significant Labs:  BMP:   Recent Labs   Lab 02/01/23  1848 02/02/23  0430 02/04/23  0321   GLU  --   --  592*      < > 132*   K 3.1*   < > 4.2   CL  --   --  104   CO2 20*   < > 11*   BUN 64.0*   < > 113*   CREATININE 4.98*   < > 7.6*   CALCIUM 8.3*   < > 8.0*   MG 2.70*  --   --     < > = values in this interval not displayed.     CMP:   Recent Labs   Lab 02/04/23  0321   *   CALCIUM 8.0*   ALBUMIN 2.5*   PROT 6.0   *   K 4.2   CO2 11*      *   CREATININE 7.6*   ALKPHOS 67   ALT 59*   AST 64*   BILITOT 0.4     All labs within the past 24 hours have been  reviewed.    Significant Imaging:  Labs: Reviewed      Assessment/Plan:     Active Diagnoses:    Diagnosis Date Noted POA    Class 2 severe obesity due to excess calories with serious comorbidity and body mass index (BMI) of 36.0 to 36.9 in adult [E66.01, Z68.36] 02/04/2023 Not Applicable    ELENA (obstructive sleep apnea) [G47.33] 02/04/2023 Unknown    Type 2 diabetes mellitus, without long-term current use of insulin [E11.9] 02/04/2023 Yes    Chronic a-fib [I48.20] 02/03/2023 Yes    Influenza A [J10.1] 02/02/2023 Yes    Acute renal failure [N17.9] 02/02/2023 Yes    HTN (hypertension) [I10] 02/02/2023 Yes    Lung nodule [R91.1] 02/02/2023 Yes    Hyperlipidemia [E78.5] 02/02/2023 Yes    Pneumonia [J18.9] 02/01/2023 Yes      Problems Resolved During this Admission:       Assessment and plan:    1. TRENT:  At this point we have nonoliguric rapidly progressive acute kidney.  Creatinine has increased over the past several days from around 4.0 up to now 7.6 on most recent laboratory studies.    At this point he is non uremic and does not need urgent dialysis.    We discussed symptoms of uremia and indications for dialysis.  He would be agreeable to dialytic support if needed.    Renal ultrasound shows slightly large kidneys particularly on the left 13 cm.  Possibility of acute allergic interstitial nephritis from exposure to azithromycin exists.  Additionally, urinalysis yesterday showed large blood with no red cells on high-power field.  This raises the possibility of pigment nephropathy.      He is not on a statin, his CK levels were not markedly elevated.  Urine and serum myoglobin levels are pending.  Additionally, urine free eosinophils pending however this is of low yield.    At this point with rapidly progressive renal failure a kidney biopsy would be the most direct way of arriving at diagnosis.  He is agreeable.  Unfortunately we can not get a biopsy done today as there is no pathologist available.  Will plan to  perform a kidney biopsy on Monday if his clinical course continues.    2. Metabolic acidosis:  Serum bicarbonate was 11.  He is on IV bicarbonate replacement.    3. Electrolytes: Potassium is stable 4.2.    4. He appears euvolemic on exam.    Approximately 70 minutes was spent in face-to-face conversation and chart review.      Thank you for your consult.     Hunter Byrd MD  Nephrology  [facility] ([facility])

## 2023-02-04 NOTE — ASSESSMENT & PLAN NOTE
Currently normotensive.   Plan:  -Optimize pain control   -Continue home metoprolol and amlodipine.  Hold Ace/Arb in setting of TRENT/renal failure    -Monitor BP  -Low salt/renal diet  -IV hydralazine prn for SBP>160 or DBP>90

## 2023-02-04 NOTE — ASSESSMENT & PLAN NOTE
- CT abd on admission at OSH showed 2.5-3 cm, round, noncalcified nodule is noted posteriorly within the left lung base and is suspicious for a neoplastic/metastatic process  - Per chart review, pt had CT chest which showed lung nodule in 03/2021 and biopsy was recommended at the time  - will need to discuss with patient re: prior work up as he reports he sees pulmonologist outside of here

## 2023-02-04 NOTE — ASSESSMENT & PLAN NOTE
Body mass index is 36.84 kg/m². Elevation likely secondary to increased calorie intake and sedentary lifestyle. Patient educated on morbidity and mortality in regards to elevated BMI and stressed importance of diet and exercise.   Plan:  -low fat/low calorie diet

## 2023-02-05 LAB
ALBUMIN SERPL BCP-MCNC: 2.6 G/DL (ref 3.5–5.2)
ALP SERPL-CCNC: 68 U/L (ref 55–135)
ALT SERPL W/O P-5'-P-CCNC: 49 U/L (ref 10–44)
ANION GAP SERPL CALC-SCNC: 15 MMOL/L (ref 8–16)
ANISOCYTOSIS BLD QL SMEAR: SLIGHT
APTT BLDCRRT: 25.4 SEC (ref 21–32)
AST SERPL-CCNC: 42 U/L (ref 10–40)
BACTERIA UR CULT: NO GROWTH
BASOPHILS # BLD AUTO: 0.02 K/UL (ref 0–0.2)
BASOPHILS NFR BLD: 0.2 % (ref 0–1.9)
BILIRUB SERPL-MCNC: 0.5 MG/DL (ref 0.1–1)
BUN SERPL-MCNC: 105 MG/DL (ref 8–23)
BURR CELLS BLD QL SMEAR: ABNORMAL
CALCIUM SERPL-MCNC: 8.5 MG/DL (ref 8.7–10.5)
CHLORIDE SERPL-SCNC: 107 MMOL/L (ref 95–110)
CO2 SERPL-SCNC: 18 MMOL/L (ref 23–29)
CREAT SERPL-MCNC: 5.6 MG/DL (ref 0.5–1.4)
DIFFERENTIAL METHOD: ABNORMAL
EOSINOPHIL # BLD AUTO: 0 K/UL (ref 0–0.5)
EOSINOPHIL NFR BLD: 0 % (ref 0–8)
ERYTHROCYTE [DISTWIDTH] IN BLOOD BY AUTOMATED COUNT: 13 % (ref 11.5–14.5)
EST. GFR  (NO RACE VARIABLE): 11 ML/MIN/1.73 M^2
GLUCOSE SERPL-MCNC: 246 MG/DL (ref 70–110)
HCT VFR BLD AUTO: 38.7 % (ref 40–54)
HGB BLD-MCNC: 13.3 G/DL (ref 14–18)
IMM GRANULOCYTES # BLD AUTO: 0.22 K/UL (ref 0–0.04)
IMM GRANULOCYTES NFR BLD AUTO: 2.6 % (ref 0–0.5)
LYMPHOCYTES # BLD AUTO: 1.3 K/UL (ref 1–4.8)
LYMPHOCYTES NFR BLD: 15 % (ref 18–48)
MCH RBC QN AUTO: 29.1 PG (ref 27–31)
MCHC RBC AUTO-ENTMCNC: 34.4 G/DL (ref 32–36)
MCV RBC AUTO: 85 FL (ref 82–98)
MONOCYTES # BLD AUTO: 1.2 K/UL (ref 0.3–1)
MONOCYTES NFR BLD: 14.2 % (ref 4–15)
NEUTROPHILS # BLD AUTO: 5.8 K/UL (ref 1.8–7.7)
NEUTROPHILS NFR BLD: 68 % (ref 38–73)
NRBC BLD-RTO: 0 /100 WBC
OVALOCYTES BLD QL SMEAR: ABNORMAL
PLATELET # BLD AUTO: 171 K/UL (ref 150–450)
PLATELET BLD QL SMEAR: ABNORMAL
PMV BLD AUTO: 9.7 FL (ref 9.2–12.9)
POCT GLUCOSE: 200 MG/DL (ref 70–110)
POCT GLUCOSE: 207 MG/DL (ref 70–110)
POCT GLUCOSE: 240 MG/DL (ref 70–110)
POCT GLUCOSE: 242 MG/DL (ref 70–110)
POTASSIUM SERPL-SCNC: 3.7 MMOL/L (ref 3.5–5.1)
PROT SERPL-MCNC: 6.1 G/DL (ref 6–8.4)
RBC # BLD AUTO: 4.57 M/UL (ref 4.6–6.2)
SODIUM SERPL-SCNC: 140 MMOL/L (ref 136–145)
WBC # BLD AUTO: 8.48 K/UL (ref 3.9–12.7)

## 2023-02-05 PROCEDURE — 25000003 PHARM REV CODE 250: Performed by: HOSPITALIST

## 2023-02-05 PROCEDURE — 94761 N-INVAS EAR/PLS OXIMETRY MLT: CPT

## 2023-02-05 PROCEDURE — 99900035 HC TECH TIME PER 15 MIN (STAT)

## 2023-02-05 PROCEDURE — 25000003 PHARM REV CODE 250: Performed by: INTERNAL MEDICINE

## 2023-02-05 PROCEDURE — 94799 UNLISTED PULMONARY SVC/PX: CPT

## 2023-02-05 PROCEDURE — 27000221 HC OXYGEN, UP TO 24 HOURS

## 2023-02-05 PROCEDURE — 99233 PR SUBSEQUENT HOSPITAL CARE,LEVL III: ICD-10-PCS | Mod: ,,, | Performed by: INTERNAL MEDICINE

## 2023-02-05 PROCEDURE — 36415 COLL VENOUS BLD VENIPUNCTURE: CPT | Performed by: INTERNAL MEDICINE

## 2023-02-05 PROCEDURE — 80053 COMPREHEN METABOLIC PANEL: CPT | Performed by: INTERNAL MEDICINE

## 2023-02-05 PROCEDURE — 99233 SBSQ HOSP IP/OBS HIGH 50: CPT | Mod: ,,, | Performed by: INTERNAL MEDICINE

## 2023-02-05 PROCEDURE — 63600175 PHARM REV CODE 636 W HCPCS: Performed by: INTERNAL MEDICINE

## 2023-02-05 PROCEDURE — 85730 THROMBOPLASTIN TIME PARTIAL: CPT | Performed by: INTERNAL MEDICINE

## 2023-02-05 PROCEDURE — 85025 COMPLETE CBC W/AUTO DIFF WBC: CPT | Performed by: INTERNAL MEDICINE

## 2023-02-05 PROCEDURE — 63600175 PHARM REV CODE 636 W HCPCS: Performed by: HOSPITALIST

## 2023-02-05 PROCEDURE — 21400001 HC TELEMETRY ROOM

## 2023-02-05 RX ADMIN — SENNOSIDES AND DOCUSATE SODIUM 1 TABLET: 50; 8.6 TABLET ORAL at 08:02

## 2023-02-05 RX ADMIN — INSULIN ASPART 6 UNITS: 100 INJECTION, SOLUTION INTRAVENOUS; SUBCUTANEOUS at 06:02

## 2023-02-05 RX ADMIN — AMLODIPINE BESYLATE 5 MG: 5 TABLET ORAL at 09:02

## 2023-02-05 RX ADMIN — INSULIN ASPART 6 UNITS: 100 INJECTION, SOLUTION INTRAVENOUS; SUBCUTANEOUS at 05:02

## 2023-02-05 RX ADMIN — CEFTRIAXONE 1 G: 1 INJECTION, POWDER, FOR SOLUTION INTRAMUSCULAR; INTRAVENOUS at 05:02

## 2023-02-05 RX ADMIN — METOPROLOL TARTRATE 25 MG: 25 TABLET, FILM COATED ORAL at 08:02

## 2023-02-05 RX ADMIN — SENNOSIDES AND DOCUSATE SODIUM 1 TABLET: 50; 8.6 TABLET ORAL at 09:02

## 2023-02-05 RX ADMIN — MUPIROCIN: 20 OINTMENT TOPICAL at 09:02

## 2023-02-05 RX ADMIN — GUAIFENESIN AND DEXTROMETHORPHAN HYDROBROMIDE 1 TABLET: 600; 30 TABLET, EXTENDED RELEASE ORAL at 09:02

## 2023-02-05 RX ADMIN — METOPROLOL TARTRATE 25 MG: 25 TABLET, FILM COATED ORAL at 09:02

## 2023-02-05 RX ADMIN — GUAIFENESIN AND DEXTROMETHORPHAN HYDROBROMIDE 1 TABLET: 600; 30 TABLET, EXTENDED RELEASE ORAL at 08:02

## 2023-02-05 RX ADMIN — SODIUM BICARBONATE: 84 INJECTION, SOLUTION INTRAVENOUS at 05:02

## 2023-02-05 RX ADMIN — INSULIN ASPART 6 UNITS: 100 INJECTION, SOLUTION INTRAVENOUS; SUBCUTANEOUS at 01:02

## 2023-02-05 NOTE — PLAN OF CARE
A241/A241 BENNETT Aguirre is a 62 y.o.male admitted on 2/3/2023 for <principal problem not specified>   Code Status: Full Code  Lead Monitored: Lead II Rhythm: normal sinus rhythm      Shift Summary:  NAEON. Patient rested throughout the night. Continued IV fluids, sodium bicarb with 0.45% normal saline. Lee in place for retention. Good UOP overnight. Patient continued on 1L via NC. Patient aware of plan No other significant events or changes to current plan of care.    Four eye skin assessment not done at shift change due to isolation precautions. Chart check completed. Continue current plan of care.      Problem: Adult Inpatient Plan of Care  Goal: Plan of Care Review  Outcome: Ongoing, Progressing  Goal: Patient-Specific Goal (Individualized)  Outcome: Ongoing, Progressing  Goal: Absence of Hospital-Acquired Illness or Injury  Outcome: Ongoing, Progressing  Goal: Optimal Comfort and Wellbeing  Outcome: Ongoing, Progressing  Goal: Readiness for Transition of Care  Outcome: Ongoing, Progressing     Problem: Diabetes Comorbidity  Goal: Blood Glucose Level Within Targeted Range  Outcome: Ongoing, Progressing     Problem: Fluid and Electrolyte Imbalance (Acute Kidney Injury/Impairment)  Goal: Fluid and Electrolyte Balance  Outcome: Ongoing, Progressing     Problem: Oral Intake Inadequate (Acute Kidney Injury/Impairment)  Goal: Optimal Nutrition Intake  Outcome: Ongoing, Progressing     Problem: Renal Function Impairment (Acute Kidney Injury/Impairment)  Goal: Effective Renal Function  Outcome: Ongoing, Progressing     Problem: Fluid Imbalance (Pneumonia)  Goal: Fluid Balance  Outcome: Ongoing, Progressing     Problem: Infection (Pneumonia)  Goal: Resolution of Infection Signs and Symptoms  Outcome: Ongoing, Progressing     Problem: Respiratory Compromise (Pneumonia)  Goal: Effective Oxygenation and Ventilation  Outcome: Ongoing, Progressing     Problem: Fall Injury Risk  Goal: Absence of Fall and Fall-Related  Injury  Outcome: Ongoing, Progressing     Problem: Infection  Goal: Absence of Infection Signs and Symptoms  Outcome: Ongoing, Progressing

## 2023-02-05 NOTE — CONSULTS
[facility])  Nephrology  Consult Note    Patient Name: Dereje Aguirre  MRN: [id number]  Admission Date: 2/3/2023  Hospital Length of Stay: 2 days  Attending Provider: Jim Fairbanks MD   Primary Care Physician: Andrey Vidales MD  Principal Problem:<principal problem not specified>    Consults  Subjective:     HPI:  62-year-old male transferred from an [facility] facility secondary to rapidly progressive acute kidney injury.  Nephrology has been consulted for evaluation.  The patient was seen in his hospital room.  In bed resting comfortably.  No acute distress noted.  He states that Monday of last week he was started on antibiotics for persistent cough and upper respiratory infection.  Antibiotics including azithromycin and Augmentin.  He states that his symptoms worsened and he was hospitalized at an [facility] facility on Wednesday February 1st.  On review of laboratory studies it appears that his presenting creatinine was around 4.  Old records were reviewed showing his creatinine was around 1.1 and September of last year.  Despite medical management his creatinine was noted to increase to over 7 and he was transferred to [facility].    He had a Lee catheter placed at the [facility] with returned about 800 cc urine.  He states that he did not have any sensation eating urinate prior to the catheter being placed.    Other than recent antibiotic exposure no other medications reported.  No over-the-counter supplements reported.  He does not take nonsteroidal anti-inflammatory agents.  He is not on a statin.  No fevers or chills related.  No rashes or oral ulcers.      02/05/2023: Patient was seen in his hospital room.  In bed resting comfortably.  No acute distress noted.  Overall states he is feeling better.    Past Medical History:   Diagnosis Date    High cholesterol     Hypertension        Past Surgical History:   Procedure Laterality Date    CORONARY ANGIOPLASTY WITH STENT PLACEMENT       REVISION TOTAL HIP ARTHROPLASTY Left        Review of patient's allergies indicates:  No Known Allergies  Current Facility-Administered Medications   Medication Frequency    acetaminophen suppository 650 mg Q6H PRN    acetaminophen tablet 650 mg Q8H PRN    albuterol-ipratropium 2.5 mg-0.5 mg/3 mL nebulizer solution 3 mL Q6H PRN    amLODIPine tablet 5 mg Daily    cefTRIAXone (ROCEPHIN) 1 g in dextrose 5 % in water (D5W) 5 % 50 mL IVPB (MB+) Q24H    dextromethorphan-guaiFENesin  mg per 12 hr tablet 1 tablet BID    dextrose 10% bolus 125 mL 125 mL PRN    dextrose 10% bolus 250 mL 250 mL PRN    enoxaparin injection 30 mg Daily    glucagon (human recombinant) injection 1 mg PRN    glucose chewable tablet 16 g PRN    glucose chewable tablet 24 g PRN    insulin aspart U-100 pen 0-15 Units Q6H PRN    melatonin tablet 6 mg Nightly PRN    metoprolol tartrate (LOPRESSOR) tablet 25 mg BID    mupirocin 2 % ointment BID    naloxone 0.4 mg/mL injection 0.02 mg PRN    ondansetron injection 4 mg Q8H PRN    [START ON 2/6/2023] oseltamivir capsule 30 mg Every other day    polyethylene glycol packet 17 g Daily PRN    promethazine tablet 25 mg Q6H PRN    senna-docusate 8.6-50 mg per tablet 1 tablet BID PRN    senna-docusate 8.6-50 mg per tablet 1 tablet BID    sodium bicarbonate 75 mEq in sodium chloride 0.45% 1,075 mL infusion Continuous    sodium chloride 0.9% flush 3 mL Q12H PRN     Family History       Problem Relation (Age of Onset)    No Known Problems Mother, Father          Tobacco Use    Smoking status: Never    Smokeless tobacco: Never   Substance and Sexual Activity    Alcohol use: Not Currently    Drug use: Never    Sexual activity: Not Currently     Review of Systems   Constitutional:  Positive for fatigue.   HENT: Negative.     Eyes: Negative.    Respiratory:  Positive for cough.    Cardiovascular: Negative.    Gastrointestinal: Negative.    Genitourinary: Negative.    Musculoskeletal: Negative.    Neurological:  Negative.    Objective:     Vital Signs (Most Recent):  Temp: 97.9 °F (36.6 °C) (02/05/23 0737)  Pulse: 82 (02/05/23 0803)  Resp: 18 (02/05/23 0803)  BP: 135/80 (02/05/23 0737)  SpO2: (!) 92 % (02/05/23 0803)   Vital Signs (24h Range):  Temp:  [97.7 °F (36.5 °C)-98.7 °F (37.1 °C)] 97.9 °F (36.6 °C)  Pulse:  [69-85] 82  Resp:  [18] 18  SpO2:  [92 %-97 %] 92 %  BP: (134-142)/(73-80) 135/80     Weight: 119.8 kg (264 lb 1.8 oz) (02/03/23 2345)  Body mass index is 36.84 kg/m².  Body surface area is 2.45 meters squared.    I/O last 3 completed shifts:  In: 2882.1 [P.O.:440; I.V.:2411.1; IV Piggyback:31]  Out: 6100 [Urine:6100]    Physical Exam  Constitutional:       Appearance: Normal appearance.   HENT:      Head: Normocephalic and atraumatic.   Eyes:      General: No scleral icterus.     Extraocular Movements: Extraocular movements intact.      Pupils: Pupils are equal, round, and reactive to light.   Cardiovascular:      Rate and Rhythm: Normal rate and regular rhythm.   Pulmonary:      Effort: Pulmonary effort is normal.      Breath sounds: Normal breath sounds.   Musculoskeletal:      Right lower leg: No edema.      Left lower leg: No edema.   Skin:     General: Skin is warm and dry.   Neurological:      General: No focal deficit present.      Mental Status: He is alert and oriented to person, place, and time.   Psychiatric:         Mood and Affect: Mood normal.         Behavior: Behavior normal.       Significant Labs:  BMP:   Recent Labs   Lab 02/01/23  1848 02/02/23  0430 02/05/23  0529   GLU  --    < > 246*      < > 140   K 3.1*   < > 3.7   CL  --    < > 107   CO2 20*   < > 18*   BUN 64.0*   < > 105*   CREATININE 4.98*   < > 5.6*   CALCIUM 8.3*   < > 8.5*   MG 2.70*  --   --     < > = values in this interval not displayed.       CMP:   Recent Labs   Lab 02/05/23  0529   *   CALCIUM 8.5*   ALBUMIN 2.6*   PROT 6.1      K 3.7   CO2 18*      *   CREATININE 5.6*   ALKPHOS 68   ALT 49*    AST 42*   BILITOT 0.5       All labs within the past 24 hours have been reviewed.    Significant Imaging:  Labs: Reviewed      Assessment/Plan:     Active Diagnoses:    Diagnosis Date Noted POA    ELENA (obstructive sleep apnea) [G47.33] 02/04/2023 Yes    Type 2 diabetes mellitus, without long-term current use of insulin [E11.9] 02/04/2023 Yes    Chronic a-fib [I48.20] 02/03/2023 Yes    Influenza A [J10.1] 02/02/2023 Yes    Acute renal failure [N17.9] 02/02/2023 Yes    HTN (hypertension) [I10] 02/02/2023 Yes    Lung nodule [R91.1] 02/02/2023 Yes    Hyperlipidemia [E78.5] 02/02/2023 Yes    Transaminitis [R74.01] 02/02/2023 Yes    Pneumonia [J18.9] 02/01/2023 Yes      Problems Resolved During this Admission:       Assessment and plan:    1. TRENT:  Creatinine has improved overnight from 7.6 down to 5.6.  Good urine output with 3.4 L reported.  At this point he is non uremic and does not need urgent dialysis.    He has no symptoms of uremia.    Renal ultrasound shows slightly large kidneys particularly on the left 13 cm.  At this point the cause of his acute kidney injury is not clear.  Several serologic tests are pending.    We discussed the improvement in his creatinine.  He is still interested in a kidney biopsy as we have no definitive diagnosis.  We discussed that a kidney biopsy does not always reveal underlying pathology however is the most direct diagnostic procedure.    2. Metabolic acidosis:  Metabolic acidosis has improved with bicarb increasing to 18 with IV replacement.    3. Electrolytes: Potassium is stable 3.7    4. He appears euvolemic on exam.    Approximately 50 minutes was spent in face-to-face conversation, coordination of care with other providers and chart review.      Thank you for your consult.     Hunter Byrd MD  Nephrology  [facility] ([facility])

## 2023-02-06 LAB
ALBUMIN SERPL BCP-MCNC: 2.6 G/DL (ref 3.5–5.2)
ALP SERPL-CCNC: 65 U/L (ref 55–135)
ALT SERPL W/O P-5'-P-CCNC: 37 U/L (ref 10–44)
ANION GAP SERPL CALC-SCNC: 13 MMOL/L (ref 8–16)
AST SERPL-CCNC: 26 U/L (ref 10–40)
BACTERIA BLD CULT: NORMAL
BASOPHILS # BLD AUTO: 0.03 K/UL (ref 0–0.2)
BASOPHILS NFR BLD: 0.6 % (ref 0–1.9)
BILIRUB SERPL-MCNC: 0.7 MG/DL (ref 0.1–1)
BUN SERPL-MCNC: 80 MG/DL (ref 8–23)
CALCIUM SERPL-MCNC: 8.6 MG/DL (ref 8.7–10.5)
CHLORIDE SERPL-SCNC: 110 MMOL/L (ref 95–110)
CO2 SERPL-SCNC: 20 MMOL/L (ref 23–29)
CREAT SERPL-MCNC: 3.8 MG/DL (ref 0.5–1.4)
DIFFERENTIAL METHOD: ABNORMAL
EOSINOPHIL # BLD AUTO: 0 K/UL (ref 0–0.5)
EOSINOPHIL NFR BLD: 0.2 % (ref 0–8)
ERYTHROCYTE [DISTWIDTH] IN BLOOD BY AUTOMATED COUNT: 13.2 % (ref 11.5–14.5)
EST. GFR  (NO RACE VARIABLE): 17 ML/MIN/1.73 M^2
GLUCOSE SERPL-MCNC: 169 MG/DL (ref 70–110)
HCT VFR BLD AUTO: 41 % (ref 40–54)
HGB BLD-MCNC: 13.7 G/DL (ref 14–18)
IMM GRANULOCYTES # BLD AUTO: 0.19 K/UL (ref 0–0.04)
IMM GRANULOCYTES NFR BLD AUTO: 3.5 % (ref 0–0.5)
INR PPP: 1 (ref 0.8–1.2)
LYMPHOCYTES # BLD AUTO: 1 K/UL (ref 1–4.8)
LYMPHOCYTES NFR BLD: 17.6 % (ref 18–48)
MCH RBC QN AUTO: 29 PG (ref 27–31)
MCHC RBC AUTO-ENTMCNC: 33.4 G/DL (ref 32–36)
MCV RBC AUTO: 87 FL (ref 82–98)
MONOCYTES # BLD AUTO: 0.8 K/UL (ref 0.3–1)
MONOCYTES NFR BLD: 13.9 % (ref 4–15)
NEUTROPHILS # BLD AUTO: 3.5 K/UL (ref 1.8–7.7)
NEUTROPHILS NFR BLD: 64.2 % (ref 38–73)
NRBC BLD-RTO: 0 /100 WBC
PLATELET # BLD AUTO: 171 K/UL (ref 150–450)
PMV BLD AUTO: 9.8 FL (ref 9.2–12.9)
POCT GLUCOSE: 161 MG/DL (ref 70–110)
POCT GLUCOSE: 165 MG/DL (ref 70–110)
POCT GLUCOSE: 187 MG/DL (ref 70–110)
POCT GLUCOSE: 209 MG/DL (ref 70–110)
POTASSIUM SERPL-SCNC: 3.4 MMOL/L (ref 3.5–5.1)
PROT SERPL-MCNC: 6.4 G/DL (ref 6–8.4)
PROTHROMBIN TIME: 11.1 SEC (ref 9–12.5)
RBC # BLD AUTO: 4.73 M/UL (ref 4.6–6.2)
SODIUM SERPL-SCNC: 143 MMOL/L (ref 136–145)
WBC # BLD AUTO: 5.39 K/UL (ref 3.9–12.7)

## 2023-02-06 PROCEDURE — 27000207 HC ISOLATION

## 2023-02-06 PROCEDURE — 21400001 HC TELEMETRY ROOM

## 2023-02-06 PROCEDURE — 25000003 PHARM REV CODE 250: Performed by: HOSPITALIST

## 2023-02-06 PROCEDURE — 25000003 PHARM REV CODE 250: Performed by: INTERNAL MEDICINE

## 2023-02-06 PROCEDURE — 85025 COMPLETE CBC W/AUTO DIFF WBC: CPT | Performed by: INTERNAL MEDICINE

## 2023-02-06 PROCEDURE — 27000221 HC OXYGEN, UP TO 24 HOURS

## 2023-02-06 PROCEDURE — 99233 PR SUBSEQUENT HOSPITAL CARE,LEVL III: ICD-10-PCS | Mod: ,,, | Performed by: INTERNAL MEDICINE

## 2023-02-06 PROCEDURE — 94761 N-INVAS EAR/PLS OXIMETRY MLT: CPT

## 2023-02-06 PROCEDURE — 85610 PROTHROMBIN TIME: CPT | Performed by: INTERNAL MEDICINE

## 2023-02-06 PROCEDURE — 80053 COMPREHEN METABOLIC PANEL: CPT | Performed by: INTERNAL MEDICINE

## 2023-02-06 PROCEDURE — 63600175 PHARM REV CODE 636 W HCPCS: Performed by: HOSPITALIST

## 2023-02-06 PROCEDURE — 36415 COLL VENOUS BLD VENIPUNCTURE: CPT | Performed by: INTERNAL MEDICINE

## 2023-02-06 PROCEDURE — 99233 SBSQ HOSP IP/OBS HIGH 50: CPT | Mod: ,,, | Performed by: INTERNAL MEDICINE

## 2023-02-06 PROCEDURE — 99900035 HC TECH TIME PER 15 MIN (STAT)

## 2023-02-06 PROCEDURE — 51798 US URINE CAPACITY MEASURE: CPT

## 2023-02-06 RX ADMIN — APIXABAN 5 MG: 2.5 TABLET, FILM COATED ORAL at 08:02

## 2023-02-06 RX ADMIN — INSULIN ASPART 3 UNITS: 100 INJECTION, SOLUTION INTRAVENOUS; SUBCUTANEOUS at 12:02

## 2023-02-06 RX ADMIN — MUPIROCIN: 20 OINTMENT TOPICAL at 08:02

## 2023-02-06 RX ADMIN — OSELTAMIVIR PHOSPHATE 30 MG: 30 CAPSULE ORAL at 08:02

## 2023-02-06 RX ADMIN — AMLODIPINE BESYLATE 5 MG: 5 TABLET ORAL at 08:02

## 2023-02-06 RX ADMIN — SODIUM BICARBONATE: 84 INJECTION, SOLUTION INTRAVENOUS at 12:02

## 2023-02-06 RX ADMIN — INSULIN ASPART 2 UNITS: 100 INJECTION, SOLUTION INTRAVENOUS; SUBCUTANEOUS at 12:02

## 2023-02-06 RX ADMIN — SENNOSIDES AND DOCUSATE SODIUM 1 TABLET: 50; 8.6 TABLET ORAL at 08:02

## 2023-02-06 RX ADMIN — CEFTRIAXONE 1 G: 1 INJECTION, POWDER, FOR SOLUTION INTRAMUSCULAR; INTRAVENOUS at 05:02

## 2023-02-06 RX ADMIN — GUAIFENESIN AND DEXTROMETHORPHAN HYDROBROMIDE 1 TABLET: 600; 30 TABLET, EXTENDED RELEASE ORAL at 08:02

## 2023-02-06 RX ADMIN — METOPROLOL TARTRATE 25 MG: 25 TABLET, FILM COATED ORAL at 08:02

## 2023-02-06 RX ADMIN — POTASSIUM BICARBONATE 35 MEQ: 391 TABLET, EFFERVESCENT ORAL at 05:02

## 2023-02-06 RX ADMIN — INSULIN ASPART 6 UNITS: 100 INJECTION, SOLUTION INTRAVENOUS; SUBCUTANEOUS at 03:02

## 2023-02-06 NOTE — PLAN OF CARE
Ongoing (interventions implemented as appropriate)  Pt is alert and oriented.    VSS  Pt able to make needs known.  Pt remained afebrile throughout this shift.   Pt remained free of falls this shift.   Pt. Is alert and oriented.   Plan of care reviewed. Patient verbalizes understanding.   Pt moving/turing independent. Frequent weight shifting encouraged.  Patient sinus rhythm on monitor.   Bed low, side rails up x 2, wheels locked, call light in reach.   Hourly rounding completed.   Will continue to observe.

## 2023-02-06 NOTE — SUBJECTIVE & OBJECTIVE
Interval History: NAEON. BG levels imprvoed today. Pt awake, alert, in NAD, reports nonproductive cough. Plan for renal biopsy on Monday 02/06    Review of Systems   All other systems reviewed and are negative.  Objective:     Vital Signs (Most Recent):  Temp: 98.1 °F (36.7 °C) (02/05/23 2016)  Pulse: 83 (02/05/23 2016)  Resp: 18 (02/05/23 2016)  BP: (!) 117/56 (02/05/23 2016)  SpO2: (!) 93 % (02/05/23 2016)   Vital Signs (24h Range):  Temp:  [97.5 °F (36.4 °C)-98.1 °F (36.7 °C)] 98.1 °F (36.7 °C)  Pulse:  [69-85] 83  Resp:  [18] 18  SpO2:  [92 %-94 %] 93 %  BP: (114-142)/(56-80) 117/56     Weight: 119.8 kg (264 lb 1.8 oz)  Body mass index is 36.84 kg/m².    Intake/Output Summary (Last 24 hours) at 2/5/2023 2035  Last data filed at 2/5/2023 1830  Gross per 24 hour   Intake 2442.12 ml   Output 5400 ml   Net -2957.88 ml      Physical Exam  Constitutional:       General: He is not in acute distress.     Appearance: Normal appearance. He is obese.   Cardiovascular:      Rate and Rhythm: Normal rate and regular rhythm.      Heart sounds: No murmur heard.  Pulmonary:      Effort: Pulmonary effort is normal. No respiratory distress.      Breath sounds: Normal breath sounds. No wheezing.   Abdominal:      General: There is no distension.      Palpations: Abdomen is soft.      Tenderness: There is no abdominal tenderness.   Neurological:      Mental Status: He is alert.       Significant Labs: All pertinent labs within the past 24 hours have been reviewed.  BMP:   Recent Labs   Lab 02/05/23  0529   *      K 3.7      CO2 18*   *   CREATININE 5.6*   CALCIUM 8.5*     CBC:   Recent Labs   Lab 02/04/23  0321 02/05/23  0529   WBC 8.58 8.48   HGB 12.9* 13.3*   HCT 38.3* 38.7*    171       Significant Imaging: I have reviewed all pertinent imaging results/findings within the past 24 hours.    US Retroperitoneal Complete   Final Result      No hydronephrosis.      Acute left medical renal disease.       Nonobstructing left renal calcification.         Electronically signed by: Chuck Denny   Date:    02/04/2023   Time:    09:55

## 2023-02-06 NOTE — PROGRESS NOTES
[facility] Medicine  Progress Note    Patient Name: Dereje Aguirre  MRN: [id number]  Patient Class: IP- Inpatient   Admission Date: 2/3/2023  Length of Stay: 2 days  Attending Physician: Jim Fairbanks MD  Primary Care Provider: Andrey Vidales MD        Subjective:     Principal Problem:Acute renal failure        HPI:  Dereje Aguirre is a 62 y.o. male with a PMH  has a past medical history of High cholesterol and Hypertension. who presented as a transfer from outside facility for higher level of care and nephrology consultation.  Presenting history noted below from [place] and outside hospital as as follows:    Patient is a 62-year-old man with history of hypertension/hyperlipidemia who had presented to his PCP Andrey Vidales MD for issues of worsening shortness of breath and cough. I spoke with Dr. Vidales and pt had been started on Azithromax and Augmentin po and was worsening so he came back in to his office on 02/01 with worsening symtpoms, Dr. Vidales felt he failed outpt management and called for admission to hospital      Patient states that for the past couple of weeks he has been having difficulty with symptoms of shortness of breath and cough.  He had gone to see his primary care physician who had treated him for a pneumonia.    Despite being treated for the symptoms patient continued to have runny nose, sinus congestion and cough.  Initially the cough was considered dry but has become more productive in the past week.  Patient states that due to the persistent cough he developed pleuritic chest pain and upper abdominal pain that gets worse when coughing and taking deep breaths.  Patient states that he had previously not had issues of shortness of breath on exertion but that has since gotten worse to the point where patient has become short of breath on minimal exertion.  Denies any constipation but has been having difficulty with diarrhea, decreased appetite and decreased oral intake.   Patient states that he has been feeling bad a lot, belching and not feeling as well as he did before.    Hospital Course:     02/02/2023 patient admitted from PCP office on 02/01 with failure of outpatient treatment known influenza A positive worsening shortness of breath requiring inpatient admission he is on 3 L of oxygen at present satting 91%  Patient BUN creatinine were 64 and 4.9 on admit this a.m. there up to 72 and 5.3 glucose was 233 also has some mild elevation of his liver functions he is no known prior history of any renal insufficiency see chest also showed a left lower lobe nodule patient states he was seen by Dr. Jimenez pulmonologist in [place] for this in the past was told it was fungal was treated with medicine for 6 months and the lesion has been stable since patient reports shortness of breath this a.m. very easily fatigued denies chest pain at present    02/03/2023 patient admitted with flu found to have worsening renal function creatinine is up to 7.3 this a.m. I called the transfer center this morning we are working on finding somewhere that has a renal/nephrologist [facility] on diversion we did place a Lee this morning and she got about 800 cc out with placement patient says he really did not have the urge to urinate denies any history of prostate trouble clinically he feels better as far as his breathing and congestion today we will await word from the transfer center regarding transfer we will leave his acute renal failure is likely multifactorial and hope he will respond to continued care without having to go on dialysis but feel he needs an inpatient renal consult at this time due to worsening renal function this was explained to the patient    02/03/2023 hospital course patient was admitted from PCP found to have mild renal insufficiency we gave him ivf with bolus and held nephrotoxic med for the however his renal function has not improved  I spoke to the  transfer center this morning and he is going to be transferred to [place] facility [facility] there other issues include chronic AFib for which he is on Eliquis he has some hyperglycemia we have ordered him some sliding scale A1c was measured at 7.8 he was not on any meds for diabetes at home prior to admit we have continued his metoprolol he was on an ACE inhibitor prior to admit which has been held also holding his lipitor due to some elevated liver function    At time of bedside assessment, patient lying in bed in no acute distress upon entering room earlier tonight without any concerns or complaints.  Patient currently pain-free and denies endorsing any lightheadedness, dizziness, headache, visual changes, fever, chills, sweats, nausea, vomiting, chest pain, shortness a breath, abdominal pain, bowel/bladder incontinence, muscle weakness, arthralgias, or onset neurological deficits.  Patient does report endorsing clear productive cough and dry mouth but reported all other review of systems negative except as noted above.  Patient updated on treatment plan in regards to Nephrology consultation and continuation of IVFs, treatment of hyperglycemia, and TRENT and agree with treatment plan moving forward.    PCP: Andrey Vidales        Overview/Hospital Course:  Pt admitted as a transfer overnight on 02/03 from OSH for management of acute renal failure. Steroids discontinued on admission due to hyperglycemia. PTA antibiotics of Rocephin/Azithromycin continued. Pt started on IV sodium bicarb for metabolic acidosis in setting of renal failure. Nephrology consulted for evaluation. Renal biopsy pending.      Interval History: NAEON. BG levels imprvoed today. Pt awake, alert, in NAD, reports nonproductive cough. Plan for renal biopsy on Monday 02/06    Review of Systems   All other systems reviewed and are negative.  Objective:     Vital Signs (Most Recent):  Temp: 98.1 °F (36.7 °C) (02/05/23 2016)  Pulse: 83 (02/05/23  2016)  Resp: 18 (02/05/23 2016)  BP: (!) 117/56 (02/05/23 2016)  SpO2: (!) 93 % (02/05/23 2016)   Vital Signs (24h Range):  Temp:  [97.5 °F (36.4 °C)-98.1 °F (36.7 °C)] 98.1 °F (36.7 °C)  Pulse:  [69-85] 83  Resp:  [18] 18  SpO2:  [92 %-94 %] 93 %  BP: (114-142)/(56-80) 117/56     Weight: 119.8 kg (264 lb 1.8 oz)  Body mass index is 36.84 kg/m².    Intake/Output Summary (Last 24 hours) at 2/5/2023 2035  Last data filed at 2/5/2023 1830  Gross per 24 hour   Intake 2442.12 ml   Output 5400 ml   Net -2957.88 ml      Physical Exam  Constitutional:       General: He is not in acute distress.     Appearance: Normal appearance. He is obese.   Cardiovascular:      Rate and Rhythm: Normal rate and regular rhythm.      Heart sounds: No murmur heard.  Pulmonary:      Effort: Pulmonary effort is normal. No respiratory distress.      Breath sounds: Normal breath sounds. No wheezing.   Abdominal:      General: There is no distension.      Palpations: Abdomen is soft.      Tenderness: There is no abdominal tenderness.   Neurological:      Mental Status: He is alert.       Significant Labs: All pertinent labs within the past 24 hours have been reviewed.  BMP:   Recent Labs   Lab 02/05/23  0529   *      K 3.7      CO2 18*   *   CREATININE 5.6*   CALCIUM 8.5*     CBC:   Recent Labs   Lab 02/04/23  0321 02/05/23  0529   WBC 8.58 8.48   HGB 12.9* 13.3*   HCT 38.3* 38.7*    171       Significant Imaging: I have reviewed all pertinent imaging results/findings within the past 24 hours.    US Retroperitoneal Complete   Final Result      No hydronephrosis.      Acute left medical renal disease.      Nonobstructing left renal calcification.         Electronically signed by: Chuck Denny   Date:    02/04/2023   Time:    09:55              Assessment/Plan:      * Acute renal failure  - BUN 64, Cr 4.98 on 02/01 on admission to OSH, uptrended to 102 and 7.38 respectively despite IV fluids. Pt transferred to  [facility]- for renal evaluation. Prev baseline   - Nephrology consulted- Dr. Byrd following- discussed with Dr. Byrd-- concern for pigment nephropathy as pt has large occult blood but no RBC vs. AIN in setting of azithro use, less likely vasculitis type picture. No need for urgent HD at this time, will continue to eval   - STOP Azithro   - Renal U/S shows no hydronephrosis, acute L medical renal disease, nonobstructing L renal calcification   - monitor strict IS and OS- ledezma in place  - monitor BMP   - renally dose meds; avoid nephrotoxics   - continue sodium bicarb infusion for acidosis       Type 2 diabetes mellitus, without long-term current use of insulin  Patient's FSGs are uncontrolled due to hyperglycemia on current medication regimen.  Last A1c reviewed-   Lab Results   Component Value Date    HGBA1C 7.8 (H) 02/03/2023     Most recent fingerstick glucose reviewed-   Recent Labs   Lab 02/04/23  0619 02/04/23  0831 02/04/23  1057 02/04/23  1505   POCTGLUCOSE >500* 448* 323* 263*     Current correctional scale  Low  titrate as needed anti-hyperglycemic dose as follows-   Antihyperglycemics (From admission, onward)    Start     Stop Route Frequency Ordered    02/04/23 0916  insulin aspart U-100 pen 0-15 Units         -- SubQ Every 6 hours PRN 02/04/23 0817      Plan:  -SSI increased to high dose   -steroids stopped  - if BG remain elevated, will add long acting insulin but will need to start low dose due to ARF   -hypoglycemic protocol  -patient will likely need diabetic regimen upon hospital discharge    ELENA (obstructive sleep apnea)  - resume CPAP qhs     Class 2 severe obesity due to excess calories with serious comorbidity and body mass index (BMI) of 36.0 to 36.9 in adult  Body mass index is 36.84 kg/m². Elevation likely secondary to increased calorie intake and sedentary lifestyle. Patient educated on morbidity and mortality in regards to elevated BMI and stressed importance of diet and exercise.    Plan:  -low fat/low calorie diet       Chronic a-fib  Patient with Long standing persistent (>12 months) atrial fibrillation which is controlled currently with Beta Blocker. Patient is currently in sinus rhythm.[id number] Score: 1. Anticoagulation indicated. Anticoagulation done with Eliquis outpatient but currently on Lovenox due to renal function..  - continue Lopressor   - continue therapeutic lovenox (renal dosing)   - lovenox will need to be stopped 24 hrs prior to biopsy due to Cr clearance per pharmacy         Hyperlipidemia  - statin held due to elevated LFTs       HTN (hypertension)  - currently normotensive   - Continue Norvasc   - ARB held due to ARF   - monitor BP       Lung nodule  - CT abd on admission at OSH showed 2.5-3 cm, round, noncalcified nodule is noted posteriorly within the left lung base and is suspicious for a neoplastic/metastatic process  - Per chart review, pt had CT chest which showed lung nodule in 03/2021 and biopsy was recommended at the time  - will need to discuss with patient re: prior work up as he reports he sees pulmonologist outside of here     Transaminitis  -LFTs downtrending, may be in setting of viral URI. Abd imaging at OSH shows no liver or gallbladder abnormalities  - monitor CMP       Influenza A  - continue Tamiflu-will decrease dose to 30 every other day based on renal function for total course of 5d   - supportive care, droplet precautions   - wean O2 as tolerated to maintain sats> 90%       Pneumonia  - Recevied rocephin + Azithro at OSH as CXR showed RLL infiltrate vs. Atelectasis  - continue Rocephin (day 4)   - stop Azithro   - Wean o2 as tolerated to maintain sats > 90%       VTE Risk Mitigation (From admission, onward)         Ordered     IP VTE HIGH RISK PATIENT  Once         02/04/23 0038     Place sequential compression device  Until discontinued         02/04/23 0038                Discharge Planning   BARBER:      Code Status: Full Code   Is the patient  medically ready for discharge?:     Reason for patient still in hospital (select all that apply): Laboratory test, Treatment and Consult recommendations  Discharge Plan A: Home            Jim Fairbanks MD  Department of Hospital Medicine   [facility] ([facility])

## 2023-02-06 NOTE — CONSULTS
Chart reviewed by Dr. Dailey.       ASSESSMENT/PLAN:    ARF    Discussed with Dr. Byrd and no intervention needed at this time.  Please reconsult if radiology can assist in treatment and/or diagnostics.         Thank you for the consult.

## 2023-02-06 NOTE — ASSESSMENT & PLAN NOTE
Continue Tamiflu-will decrease dose to 30 mg every other day based on renal function for total course of 5d   Continue supportive care, droplet precautions   Wean O2 as tolerated to maintain sats> 90%

## 2023-02-06 NOTE — ASSESSMENT & PLAN NOTE
Resolved   May be in setting of viral URI. Abd imaging at OSH shows no liver or gallbladder abnormalities

## 2023-02-06 NOTE — SUBJECTIVE & OBJECTIVE
Interval History: No acute events overnight.  Seen and examined without any family present.  Cough is improving.  Denies any other complaints today.  Patient had agreed to renal biopsy being canceled after discussing with Nephrology.  Lee catheter to be removed, monitor for urinary retention post Lee removal.      Review of Systems   Constitutional:  Negative for chills and fever.   Respiratory:  Positive for cough. Negative for shortness of breath and wheezing.    Cardiovascular:  Negative for chest pain and palpitations.   Gastrointestinal:  Negative for abdominal pain, constipation, diarrhea, nausea and vomiting.   Neurological:  Negative for dizziness and headaches.   All other systems reviewed and are negative.  Objective:     Vital Signs (Most Recent):  Temp: 98 °F (36.7 °C) (02/06/23 1247)  Pulse: 76 (02/06/23 1500)  Resp: 18 (02/06/23 1500)  BP: (!) 112/57 (02/06/23 1247)  SpO2: (!) 94 % (02/06/23 1500)   Vital Signs (24h Range):  Temp:  [97.8 °F (36.6 °C)-98.1 °F (36.7 °C)] 98 °F (36.7 °C)  Pulse:  [65-85] 76  Resp:  [16-18] 18  SpO2:  [92 %-96 %] 94 %  BP: (107-124)/(55-77) 112/57     Weight: 119.8 kg (264 lb 1.8 oz)  Body mass index is 36.84 kg/m².    Intake/Output Summary (Last 24 hours) at 2/6/2023 1538  Last data filed at 2/6/2023 1423  Gross per 24 hour   Intake --   Output 4450 ml   Net -4450 ml      Physical Exam  Vitals and nursing note reviewed.   Constitutional:       General: He is not in acute distress.     Appearance: He is obese. He is not ill-appearing.   HENT:      Head: Normocephalic and atraumatic.      Right Ear: External ear normal.      Left Ear: External ear normal.      Nose: Nose normal.      Mouth/Throat:      Mouth: Mucous membranes are moist.   Eyes:      Extraocular Movements: Extraocular movements intact.      Pupils: Pupils are equal, round, and reactive to light.   Cardiovascular:      Rate and Rhythm: Normal rate and regular rhythm.   Pulmonary:      Effort: Pulmonary  effort is normal. No respiratory distress.      Breath sounds: No wheezing.   Abdominal:      General: Bowel sounds are normal.      Palpations: Abdomen is soft.      Tenderness: There is no abdominal tenderness. There is no guarding or rebound.   Genitourinary:     Comments: Lee catheter in place  Musculoskeletal:      Cervical back: Neck supple.      Right lower leg: No edema.      Left lower leg: No edema.   Skin:     General: Skin is warm and dry.   Neurological:      Mental Status: He is alert and oriented to person, place, and time. Mental status is at baseline.       Significant Labs: All pertinent labs within the past 24 hours have been reviewed.  CBC:   Recent Labs   Lab 02/05/23  0529 02/06/23  0548   WBC 8.48 5.39   HGB 13.3* 13.7*   HCT 38.7* 41.0    171     CMP:   Recent Labs   Lab 02/05/23  0529 02/06/23  0547    143   K 3.7 3.4*    110   CO2 18* 20*   * 169*   * 80*   CREATININE 5.6* 3.8*   CALCIUM 8.5* 8.6*   PROT 6.1 6.4   ALBUMIN 2.6* 2.6*   BILITOT 0.5 0.7   ALKPHOS 68 65   AST 42* 26   ALT 49* 37   ANIONGAP 15 13       Significant Imaging: I have reviewed all pertinent imaging results/findings within the past 24 hours.

## 2023-02-06 NOTE — ASSESSMENT & PLAN NOTE
Recevied rocephin + Azithro at OSH as CXR showed RLL infiltrate vs. Atelectasis  Continue Rocephin (day 5)   Azithromycin was discontinued  Weaned off supplemental O2

## 2023-02-06 NOTE — ASSESSMENT & PLAN NOTE
BUN 64, Cr 4.98 on 02/01 on admission to OSH, uptrended to 102 and 7.38 respectively despite IV fluids. Pt transferred to [facility] for renal evaluation. Prev baseline   Nephrology consulted- Dr. Byrd following- discussed with Dr. Byrd-- concern for pigment nephropathy as pt has large occult blood but no RBC vs. AIN in setting of azithro use versus intermittent partial obstruction secondary to extensive Benadryl use causing symptoms of prostatism.  Less likely vasculitis type picture. No need for urgent HD at this time  Renal U/S shows no hydronephrosis, acute L medical renal disease, nonobstructing L renal calcification  Initially planned for renal biopsy on 02/06, however this was canceled as renal function continued to improve  Continue strict Is&Os   Renally dose meds; avoid nephrotoxics   Continue sodium bicarb infusion for acidosis

## 2023-02-06 NOTE — ASSESSMENT & PLAN NOTE
CT abd on admission at OSH showed 2.5-3 cm, round, noncalcified nodule is noted posteriorly within the left lung base and is suspicious for a neoplastic/metastatic process  Per chart review, pt had CT chest which showed lung nodule in 03/2021 and biopsy was recommended at the time  Will need to discuss with patient to follow up with outpatient pulmonologist

## 2023-02-06 NOTE — ASSESSMENT & PLAN NOTE
Patient with Long standing persistent (>12 months) atrial fibrillation which is controlled currently with Beta Blocker. Patient is currently in sinus rhythm.[id number] Score: 1. Anticoagulation indicated. Anticoagulation done with Eliquis.  Continue Lopressor   Resume Eliquis

## 2023-02-06 NOTE — CONSULTS
[facility])  Nephrology  Consult Note    Patient Name: Dereje Aguirre  MRN: [id number]  Admission Date: 2/3/2023  Hospital Length of Stay: 3 days  Attending Provider: Britt Li DO   Primary Care Physician: Andrey Vidales MD  Principal Problem:Acute renal failure    Consults  Subjective:     HPI:  62-year-old male transferred from an [facility] facility secondary to rapidly progressive acute kidney injury.  Nephrology has been consulted for evaluation.  The patient was seen in his hospital room.  In bed resting comfortably.  No acute distress noted.  He states that Monday of last week he was started on antibiotics for persistent cough and upper respiratory infection.  Antibiotics including azithromycin and Augmentin.  He states that his symptoms worsened and he was hospitalized at an [facility] facility on Wednesday February 1st.  On review of laboratory studies it appears that his presenting creatinine was around 4.  Old records were reviewed showing his creatinine was around 1.1 and September of last year.  Despite medical management his creatinine was noted to increase to over 7 and he was transferred to [facility].    He had a Lee catheter placed at the [facility] with returned about 800 cc urine.  He states that he did not have any sensation eating urinate prior to the catheter being placed.    Other than recent antibiotic exposure no other medications reported.  No over-the-counter supplements reported.  He does not take nonsteroidal anti-inflammatory agents.  He is not on a statin.  No fevers or chills related.  No rashes or oral ulcers.      02/05/2023: Patient was seen in his hospital room.  In bed resting comfortably.  No acute distress noted.  Overall states he is feeling better.    02/06/2023: Patient was seen in his hospital room.  In bed resting comfortably.  No new complaints or issues from overnight.  States he is feeling better since admission.    Past Medical  History:   Diagnosis Date    High cholesterol     Hypertension        Past Surgical History:   Procedure Laterality Date    CORONARY ANGIOPLASTY WITH STENT PLACEMENT      REVISION TOTAL HIP ARTHROPLASTY Left        Review of patient's allergies indicates:  No Known Allergies  Current Facility-Administered Medications   Medication Frequency    acetaminophen suppository 650 mg Q6H PRN    acetaminophen tablet 650 mg Q8H PRN    albuterol-ipratropium 2.5 mg-0.5 mg/3 mL nebulizer solution 3 mL Q6H PRN    amLODIPine tablet 5 mg Daily    cefTRIAXone (ROCEPHIN) 1 g in dextrose 5 % in water (D5W) 5 % 50 mL IVPB (MB+) Q24H    dextromethorphan-guaiFENesin  mg per 12 hr tablet 1 tablet BID    dextrose 10% bolus 125 mL 125 mL PRN    dextrose 10% bolus 250 mL 250 mL PRN    glucagon (human recombinant) injection 1 mg PRN    glucose chewable tablet 16 g PRN    glucose chewable tablet 24 g PRN    insulin aspart U-100 pen 0-15 Units Q6H PRN    melatonin tablet 6 mg Nightly PRN    metoprolol tartrate (LOPRESSOR) tablet 25 mg BID    mupirocin 2 % ointment BID    naloxone 0.4 mg/mL injection 0.02 mg PRN    ondansetron injection 4 mg Q8H PRN    polyethylene glycol packet 17 g Daily PRN    promethazine tablet 25 mg Q6H PRN    senna-docusate 8.6-50 mg per tablet 1 tablet BID PRN    senna-docusate 8.6-50 mg per tablet 1 tablet BID    sodium bicarbonate 75 mEq in sodium chloride 0.45% 1,075 mL infusion Continuous    sodium chloride 0.9% flush 3 mL Q12H PRN     Family History       Problem Relation (Age of Onset)    No Known Problems Mother, Father          Tobacco Use    Smoking status: Never    Smokeless tobacco: Never   Substance and Sexual Activity    Alcohol use: Not Currently    Drug use: Never    Sexual activity: Not Currently     Review of Systems   Constitutional:  Positive for fatigue.   HENT: Negative.     Eyes: Negative.    Respiratory:  Positive for cough.    Cardiovascular: Negative.    Gastrointestinal: Negative.     Genitourinary: Negative.    Musculoskeletal: Negative.    Neurological: Negative.    Objective:     Vital Signs (Most Recent):  Temp: 97.8 °F (36.6 °C) (02/06/23 0822)  Pulse: 85 (02/06/23 0822)  Resp: 18 (02/06/23 0822)  BP: (!) 114/56 (02/06/23 0822)  SpO2: 95 % (02/06/23 0828)   Vital Signs (24h Range):  Temp:  [97.5 °F (36.4 °C)-98.1 °F (36.7 °C)] 97.8 °F (36.6 °C)  Pulse:  [65-85] 85  Resp:  [16-18] 18  SpO2:  [92 %-96 %] 95 %  BP: (107-124)/(55-77) 114/56     Weight: 119.8 kg (264 lb 1.8 oz) (02/06/23 0033)  Body mass index is 36.84 kg/m².  Body surface area is 2.45 meters squared.    I/O last 3 completed shifts:  In: 2442.1 [I.V.:2411.1; IV Piggyback:31]  Out: 7550 [Urine:7550]    Physical Exam  Constitutional:       Appearance: Normal appearance.   HENT:      Head: Normocephalic and atraumatic.   Eyes:      General: No scleral icterus.     Extraocular Movements: Extraocular movements intact.      Pupils: Pupils are equal, round, and reactive to light.   Cardiovascular:      Rate and Rhythm: Normal rate and regular rhythm.   Pulmonary:      Effort: Pulmonary effort is normal.      Breath sounds: Normal breath sounds.   Musculoskeletal:      Right lower leg: No edema.      Left lower leg: No edema.   Skin:     General: Skin is warm and dry.   Neurological:      General: No focal deficit present.      Mental Status: He is alert and oriented to person, place, and time.   Psychiatric:         Mood and Affect: Mood normal.         Behavior: Behavior normal.       Significant Labs:  BMP:   Recent Labs   Lab 02/01/23  1848 02/02/23  0430 02/06/23  0547   GLU  --    < > 169*      < > 143   K 3.1*   < > 3.4*   CL  --    < > 110   CO2 20*   < > 20*   BUN 64.0*   < > 80*   CREATININE 4.98*   < > 3.8*   CALCIUM 8.3*   < > 8.6*   MG 2.70*  --   --     < > = values in this interval not displayed.       CMP:   Recent Labs   Lab 02/06/23  0547   *   CALCIUM 8.6*   ALBUMIN 2.6*   PROT 6.4      K 3.4*    CO2 20*      BUN 80*   CREATININE 3.8*   ALKPHOS 65   ALT 37   AST 26   BILITOT 0.7       All labs within the past 24 hours have been reviewed.    Significant Imaging:  Labs: Reviewed      Assessment/Plan:     Active Diagnoses:    Diagnosis Date Noted POA    PRINCIPAL PROBLEM:  Acute renal failure [N17.9] 02/02/2023 Yes    ELENA (obstructive sleep apnea) [G47.33] 02/04/2023 Yes    Type 2 diabetes mellitus, without long-term current use of insulin [E11.9] 02/04/2023 Yes    Chronic a-fib [I48.20] 02/03/2023 Yes    Influenza A [J10.1] 02/02/2023 Yes    HTN (hypertension) [I10] 02/02/2023 Yes    Lung nodule [R91.1] 02/02/2023 Yes    Hyperlipidemia [E78.5] 02/02/2023 Yes    Transaminitis [R74.01] 02/02/2023 Yes    Pneumonia [J18.9] 02/01/2023 Yes      Problems Resolved During this Admission:       Assessment and plan:    1. TRENT:  Creatinine continues to improve decreasing from a peak of 7.6 down to 3.8 on most recent laboratory studies.  Continues with good urine output, 3.9 L reported.  He is net -8 L since admission.      Renal ultrasound shows slightly large kidneys particularly on the left 13 cm.  At this point the cause of his acute kidney injury is not clear.  Several serologic tests are pending.    As his creatinine continues to improve we discussed the risks benefit ratio of proceeding with a kidney biopsy.  I explained that at this point we would not know for certain what caused his kidney failure.  Working diagnoses are:  1. 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 intermittent partial obstruction secondary to extensive Benadryl use causing symptoms of prostatism.    The possibility of a vasculitis or autoimmune process seems very unlikely as he is clinically stable and does not appear to be ill.  Additionally, renal function is improving with no immunosuppressive therapy initiated.    He is agreeable with cancelling the biopsy.    At this point I would suggest removing his  catheter to make sure that he can void appropriately.  He is stable and could be discharged with close primary care or nephrology follow-up as an outpatient.    2. Metabolic acidosis:  Metabolic acidosis has improved with bicarb increasing to 18 with IV replacement.    3. Electrolytes: Potassium is stable 3.7    4. He appears euvolemic on exam.    Approximately 50 minutes was spent in face-to-face conversation, coordination of care with other providers and chart review.      Thank you for your consult.     Hunter Byrd MD  Nephrology  [facility] [facility] ([facility])

## 2023-02-06 NOTE — ASSESSMENT & PLAN NOTE
Patient's FSGs are uncontrolled due to hyperglycemia on current medication regimen.  Last A1c reviewed-   Lab Results   Component Value Date    HGBA1C 7.8 (H) 02/03/2023     Most recent fingerstick glucose reviewed-   Recent Labs   Lab 02/05/23 2034 02/06/23  0030 02/06/23  0529 02/06/23  1048   POCTGLUCOSE 200* 187* 161* 165*     Current correctional scale  High  titrate as needed anti-hyperglycemic dose as follows-   Antihyperglycemics (From admission, onward)    Start     Stop Route Frequency Ordered    02/04/23 0916  insulin aspart U-100 pen 0-15 Units         -- SubQ Every 6 hours PRN 02/04/23 0817      Plan:  -SSI increased to high dose   -steroids stopped  - if BG remain elevated, will add long acting insulin but will need to start low dose due to ARF   -hypoglycemic protocol  -we will need close follow-up with PCP for home diabetic regimen

## 2023-02-06 NOTE — PROGRESS NOTES
"[facility] ([facility])  Adult Nutrition  Progress Note    SUMMARY     Recommendations  1) Continue renal carb controlled diet as tolerated   2) If PO < 50% meals, Add Novasource Renal BID   3) Monitor wts   4) NFPE as appropriate at follow up    Goals: Pt to meet > 75% EEN by RD follow up  Nutrition Goal Status: new  Communication of RD Recs: other (comment) (POC)    Assessment and Plan  Nutrition Problem  Inadequate oral intake    Related to (etiology):   Decreased appetite    Signs and Symptoms (as evidenced by):   Pt endorses decreased PO and 14-23 lbs wt loss    Interventions(treatment strategy):  Collaboration with other providers  Mineral Modified Diet  Carb controlled diet    Nutrition Diagnosis Status:   New      Malnutrition Assessment  LEA NFPE due to RD remote    Reason for Assessment  Reason For Assessment: identified at risk by screening criteria (MST3)  Diagnosis: other (see comments) (Acute renal failure)  Relevant Medical History: High cholesterol and Hypertension, DM  Interdisciplinary Rounds: did not attend  General Information Comments: RD remote for coverage, pt admitted with flu, acute renal failure. renal biopsy today. LBM 2/2, intake varies during admit ~ 75% intake. Pt endorses decreased appetite and diarrhea on admit. MST 3: Pt endorses 14-23 lbs wt loss and decreased PO. No wt history on file to confirm time frame of wt loss. Suspect malnutrition, LEA NFPE due to RD remote.  Nutrition Discharge Planning: Discharge on renal, carb controlled diet    Nutrition Risk Screen  Nutrition Risk Screen: no indicators present    Nutrition/Diet History  Food Allergies: NKFA  Factors Affecting Nutritional Intake: diarrhea, decreased appetite    Anthropometrics  Temp: 98 °F (36.7 °C)  Height Method: Stated  Height: 5' 11" (180.3 cm)  Height (inches): 71 in  Weight Method: Bed Scale  Weight: 119.8 kg (264 lb 1.8 oz)  Weight (lb): 264.11 lb  Ideal Body Weight (IBW), Male: 172 lb  % Ideal Body Weight, " Male (lb): 153.55 %  BMI (Calculated): 36.9       Lab/Procedures/Meds  Pertinent Labs Reviewed: reviewed  Pertinent Labs Comments: K 3.4, BUN 80, Cr 3.8, GFR 17, Glu 169, Ca 8.6, Alb 2.6, Hgb A1c 7.8 on 2/3/23  Pertinent Medications Reviewed: reviewed  Pertinent Medications Comments: amlodipine, senna-docusate    Estimated/Assessed Needs  Weight Used For Calorie Calculations: 119.7 kg (264 lb)  Energy Calorie Requirements (kcal): 2019 kcal/day based on MSJ x no AF for BMI > 30  Energy Need Method: Dayhoit-St Jeor  Protein Requirements: 72-96 g/day based on 0.6-0.8 g/kg for acute renal failure  Weight Used For Protein Calculations: 119.7 kg (264 lb)  Fluid Requirements (mL): 500 mL + UOP or per MD for TRENT  Estimated Fluid Requirement Method: other (see comments)  RDA Method (mL): 2019  CHO Requirement: 250 g/day based on 50% kcal from CHO    Nutrition Prescription Ordered  Current Diet Order: Renal, carb controlled    Evaluation of Received Nutrient/Fluid Intake    Intake/Output Summary (Last 24 hours) at 2/6/2023 1442  Last data filed at 2/6/2023 1423  Gross per 24 hour   Intake --   Output 4450 ml   Net -4450 ml       I/O: -5.8 L since admit  Energy Calories Required: meeting needs  Protein Required: meeting needs  Fluid Required: not meeting needs  Comments: LBM 2/2  Tolerance: tolerating  % Intake of Estimated Energy Needs: Other: varies  % Meal Intake: Other: varies ~ 75% but also skips meals per chart    Nutrition Risk  Level of Risk/Frequency of Follow-up:  (1-2x weekly)     Monitor and Evaluation  Food and Nutrient Intake: energy intake, food and beverage intake  Food and Nutrient Adminstration: diet order  Knowledge/Beliefs/Attitudes: beliefs and attitudes  Physical Activity and Function: nutrition-related ADLs and IADLs  Anthropometric Measurements: body mass index, weight change, weight  Biochemical Data, Medical Tests and Procedures: electrolyte and renal panel, lipid profile, gastrointestinal profile,  glucose/endocrine profile, inflammatory profile  Nutrition-Focused Physical Findings: overall appearance, extremities, muscles and bones, skin     Nutrition Follow-Up  RD Follow-up?: Yes

## 2023-02-06 NOTE — PLAN OF CARE
Recommendations  1) Continue renal carb controlled diet as tolerated   2) If PO < 50% meals, Add Novasource Renal BID   3) Monitor wts   4) NFPE as appropriate at follow up    Goals: Pt to meet > 75% EEN by RD follow up  Nutrition Goal Status: new  Communication of RD Recs: other (comment) (POC)    Assessment and Plan  Nutrition Problem  Inadequate oral intake    Related to (etiology):   Decreased appetite    Signs and Symptoms (as evidenced by):   Pt endorses decreased PO and 14-23 lbs wt loss    Interventions(treatment strategy):  Collaboration with other providers  Mineral Modified Diet  Carb controlled diet    Nutrition Diagnosis Status:   New

## 2023-02-07 VITALS
HEIGHT: 71 IN | OXYGEN SATURATION: 94 % | WEIGHT: 264.13 LBS | HEART RATE: 80 BPM | SYSTOLIC BLOOD PRESSURE: 118 MMHG | BODY MASS INDEX: 36.98 KG/M2 | DIASTOLIC BLOOD PRESSURE: 73 MMHG | RESPIRATION RATE: 18 BRPM | TEMPERATURE: 98 F

## 2023-02-07 LAB
ALBUMIN SERPL BCP-MCNC: 2.6 G/DL (ref 3.5–5.2)
ALP SERPL-CCNC: 60 U/L (ref 55–135)
ALT SERPL W/O P-5'-P-CCNC: 31 U/L (ref 10–44)
ANION GAP SERPL CALC-SCNC: 13 MMOL/L (ref 8–16)
AST SERPL-CCNC: 21 U/L (ref 10–40)
BACTERIA BLD CULT: NORMAL
BASOPHILS # BLD AUTO: 0.02 K/UL (ref 0–0.2)
BASOPHILS NFR BLD: 0.4 % (ref 0–1.9)
BILIRUB SERPL-MCNC: 0.9 MG/DL (ref 0.1–1)
BUN SERPL-MCNC: 58 MG/DL (ref 8–23)
CALCIUM SERPL-MCNC: 8.6 MG/DL (ref 8.7–10.5)
CHLORIDE SERPL-SCNC: 109 MMOL/L (ref 95–110)
CO2 SERPL-SCNC: 22 MMOL/L (ref 23–29)
CREAT SERPL-MCNC: 2.7 MG/DL (ref 0.5–1.4)
DIFFERENTIAL METHOD: ABNORMAL
EOSINOPHIL # BLD AUTO: 0.1 K/UL (ref 0–0.5)
EOSINOPHIL NFR BLD: 1.7 % (ref 0–8)
ERYTHROCYTE [DISTWIDTH] IN BLOOD BY AUTOMATED COUNT: 13 % (ref 11.5–14.5)
EST. GFR  (NO RACE VARIABLE): 26 ML/MIN/1.73 M^2
GLUCOSE SERPL-MCNC: 152 MG/DL (ref 70–110)
HCT VFR BLD AUTO: 39.9 % (ref 40–54)
HGB BLD-MCNC: 13.2 G/DL (ref 14–18)
IMM GRANULOCYTES # BLD AUTO: 0.2 K/UL (ref 0–0.04)
IMM GRANULOCYTES NFR BLD AUTO: 4.2 % (ref 0–0.5)
LYMPHOCYTES # BLD AUTO: 0.9 K/UL (ref 1–4.8)
LYMPHOCYTES NFR BLD: 18.3 % (ref 18–48)
MAGNESIUM SERPL-MCNC: 1.9 MG/DL (ref 1.6–2.6)
MCH RBC QN AUTO: 28.5 PG (ref 27–31)
MCHC RBC AUTO-ENTMCNC: 33.1 G/DL (ref 32–36)
MCV RBC AUTO: 86 FL (ref 82–98)
MONOCYTES # BLD AUTO: 0.6 K/UL (ref 0.3–1)
MONOCYTES NFR BLD: 13.4 % (ref 4–15)
MYOGLOBIN SERPL-MCNC: 182 MCG/L
MYOGLOBIN UR-MCNC: 509 MCG/L
NEUTROPHILS # BLD AUTO: 3 K/UL (ref 1.8–7.7)
NEUTROPHILS NFR BLD: 62 % (ref 38–73)
NRBC BLD-RTO: 0 /100 WBC
PLATELET # BLD AUTO: 207 K/UL (ref 150–450)
PMV BLD AUTO: 9.7 FL (ref 9.2–12.9)
POCT GLUCOSE: 148 MG/DL (ref 70–110)
POCT GLUCOSE: 159 MG/DL (ref 70–110)
POTASSIUM SERPL-SCNC: 3.4 MMOL/L (ref 3.5–5.1)
PROT SERPL-MCNC: 6.6 G/DL (ref 6–8.4)
RBC # BLD AUTO: 4.63 M/UL (ref 4.6–6.2)
SODIUM SERPL-SCNC: 144 MMOL/L (ref 136–145)
WBC # BLD AUTO: 4.76 K/UL (ref 3.9–12.7)

## 2023-02-07 PROCEDURE — 99233 PR SUBSEQUENT HOSPITAL CARE,LEVL III: ICD-10-PCS | Mod: ,,, | Performed by: INTERNAL MEDICINE

## 2023-02-07 PROCEDURE — 25000003 PHARM REV CODE 250: Performed by: INTERNAL MEDICINE

## 2023-02-07 PROCEDURE — 85025 COMPLETE CBC W/AUTO DIFF WBC: CPT | Performed by: INTERNAL MEDICINE

## 2023-02-07 PROCEDURE — 25000003 PHARM REV CODE 250: Performed by: HOSPITALIST

## 2023-02-07 PROCEDURE — 83735 ASSAY OF MAGNESIUM: CPT | Performed by: INTERNAL MEDICINE

## 2023-02-07 PROCEDURE — 99233 SBSQ HOSP IP/OBS HIGH 50: CPT | Mod: ,,, | Performed by: INTERNAL MEDICINE

## 2023-02-07 PROCEDURE — 80053 COMPREHEN METABOLIC PANEL: CPT | Performed by: INTERNAL MEDICINE

## 2023-02-07 PROCEDURE — 94761 N-INVAS EAR/PLS OXIMETRY MLT: CPT

## 2023-02-07 PROCEDURE — 36415 COLL VENOUS BLD VENIPUNCTURE: CPT | Performed by: INTERNAL MEDICINE

## 2023-02-07 RX ORDER — ROSUVASTATIN CALCIUM 40 MG/1
TABLET, COATED ORAL
COMMUNITY
Start: 2022-11-15

## 2023-02-07 RX ORDER — IBUPROFEN 800 MG/1
TABLET ORAL
Status: ON HOLD | COMMUNITY
Start: 2022-11-11 | End: 2023-02-07 | Stop reason: HOSPADM

## 2023-02-07 RX ORDER — BENAZEPRIL HYDROCHLORIDE 40 MG/1
TABLET ORAL
Status: ON HOLD | COMMUNITY
Start: 2022-12-05 | End: 2023-02-07 | Stop reason: HOSPADM

## 2023-02-07 RX ORDER — EZETIMIBE 10 MG/1
TABLET ORAL
COMMUNITY
Start: 2022-11-17

## 2023-02-07 RX ADMIN — GUAIFENESIN AND DEXTROMETHORPHAN HYDROBROMIDE 1 TABLET: 600; 30 TABLET, EXTENDED RELEASE ORAL at 09:02

## 2023-02-07 RX ADMIN — SENNOSIDES AND DOCUSATE SODIUM 1 TABLET: 50; 8.6 TABLET ORAL at 09:02

## 2023-02-07 RX ADMIN — POTASSIUM BICARBONATE 25 MEQ: 978 TABLET, EFFERVESCENT ORAL at 09:02

## 2023-02-07 RX ADMIN — MUPIROCIN: 20 OINTMENT TOPICAL at 09:02

## 2023-02-07 RX ADMIN — AMLODIPINE BESYLATE 5 MG: 5 TABLET ORAL at 09:02

## 2023-02-07 RX ADMIN — APIXABAN 5 MG: 2.5 TABLET, FILM COATED ORAL at 09:02

## 2023-02-07 RX ADMIN — INSULIN ASPART 2 UNITS: 100 INJECTION, SOLUTION INTRAVENOUS; SUBCUTANEOUS at 12:02

## 2023-02-07 RX ADMIN — METOPROLOL TARTRATE 25 MG: 25 TABLET, FILM COATED ORAL at 09:02

## 2023-02-07 NOTE — CONSULTS
[facility])  Nephrology  Consult Note    Patient Name: Dereje Aguirre  MRN: [id number]  Admission Date: 2/3/2023  Hospital Length of Stay: 4 days  Attending Provider: Britt Li DO   Primary Care Physician: Andrey Vidales MD  Principal Problem:Acute renal failure    Consults  Subjective:     HPI:  62-year-old male transferred from an [facility] facility secondary to rapidly progressive acute kidney injury.  Nephrology has been consulted for evaluation.  The patient was seen in his hospital room.  In bed resting comfortably.  No acute distress noted.  He states that Monday of last week he was started on antibiotics for persistent cough and upper respiratory infection.  Antibiotics including azithromycin and Augmentin.  He states that his symptoms worsened and he was hospitalized at an [facility] facility on Wednesday February 1st.  On review of laboratory studies it appears that his presenting creatinine was around 4.  Old records were reviewed showing his creatinine was around 1.1 and September of last year.  Despite medical management his creatinine was noted to increase to over 7 and he was transferred to [facility].    He had a Lee catheter placed at the [facility] with returned about 800 cc urine.  He states that he did not have any sensation eating urinate prior to the catheter being placed.    Other than recent antibiotic exposure no other medications reported.  No over-the-counter supplements reported.  He does not take nonsteroidal anti-inflammatory agents.  He is not on a statin.  No fevers or chills related.  No rashes or oral ulcers.      02/05/2023: Patient was seen in his hospital room.  In bed resting comfortably.  No acute distress noted.  Overall states he is feeling better.    02/06/2023: Patient was seen in his hospital room.  In bed resting comfortably.  No new complaints or issues from overnight.  States he is feeling better since admission.    02/07/2023:  Patient  was seen in his hospital room.  In bed resting comfortably.  No acute distress noted.  States he is voiding without difficulty after his Lee catheter was removed.        Past Medical History:   Diagnosis Date    High cholesterol     Hypertension        Past Surgical History:   Procedure Laterality Date    CORONARY ANGIOPLASTY WITH STENT PLACEMENT      REVISION TOTAL HIP ARTHROPLASTY Left        Review of patient's allergies indicates:  No Known Allergies  Current Facility-Administered Medications   Medication Frequency    acetaminophen suppository 650 mg Q6H PRN    acetaminophen tablet 650 mg Q8H PRN    amLODIPine tablet 5 mg Daily    apixaban tablet 5 mg BID    cefTRIAXone (ROCEPHIN) 1 g in dextrose 5 % in water (D5W) 5 % 50 mL IVPB (MB+) Q24H    dextromethorphan-guaiFENesin  mg per 12 hr tablet 1 tablet BID    dextrose 10% bolus 125 mL 125 mL PRN    dextrose 10% bolus 250 mL 250 mL PRN    glucagon (human recombinant) injection 1 mg PRN    glucose chewable tablet 16 g PRN    glucose chewable tablet 24 g PRN    insulin aspart U-100 pen 0-15 Units Q6H PRN    melatonin tablet 6 mg Nightly PRN    metoprolol tartrate (LOPRESSOR) tablet 25 mg BID    mupirocin 2 % ointment BID    naloxone 0.4 mg/mL injection 0.02 mg PRN    ondansetron injection 4 mg Q8H PRN    polyethylene glycol packet 17 g Daily PRN    promethazine tablet 25 mg Q6H PRN    senna-docusate 8.6-50 mg per tablet 1 tablet BID PRN    senna-docusate 8.6-50 mg per tablet 1 tablet BID    sodium bicarbonate 75 mEq in sodium chloride 0.45% 1,075 mL infusion Continuous    sodium chloride 0.9% flush 3 mL Q12H PRN     Family History       Problem Relation (Age of Onset)    No Known Problems Mother, Father          Tobacco Use    Smoking status: Never    Smokeless tobacco: Never   Substance and Sexual Activity    Alcohol use: Not Currently    Drug use: Never    Sexual activity: Not Currently     Review of Systems   Constitutional:  Positive for fatigue.   HENT:  Negative.     Eyes: Negative.    Respiratory:  Positive for cough.    Cardiovascular: Negative.    Gastrointestinal: Negative.    Genitourinary: Negative.    Musculoskeletal: Negative.    Neurological: Negative.    Objective:     Vital Signs (Most Recent):  Temp: 98.3 °F (36.8 °C) (02/07/23 0530)  Pulse: 77 (02/07/23 0530)  Resp: 18 (02/07/23 0829)  BP: (!) 109/59 (02/07/23 0530)  SpO2: (!) 93 % (02/07/23 0829)   Vital Signs (24h Range):  Temp:  [98 °F (36.7 °C)-98.4 °F (36.9 °C)] 98.3 °F (36.8 °C)  Pulse:  [73-88] 77  Resp:  [18] 18  SpO2:  [92 %-96 %] 93 %  BP: (109-125)/(56-77) 109/59     Weight: 119.8 kg (264 lb 1.8 oz) (02/06/23 0033)  Body mass index is 36.84 kg/m².  Body surface area is 2.45 meters squared.    I/O last 3 completed shifts:  In: -   Out: 5700 [Urine:5700]    Physical Exam  Constitutional:       Appearance: Normal appearance.   HENT:      Head: Normocephalic and atraumatic.   Eyes:      General: No scleral icterus.     Extraocular Movements: Extraocular movements intact.      Pupils: Pupils are equal, round, and reactive to light.   Cardiovascular:      Rate and Rhythm: Normal rate and regular rhythm.   Pulmonary:      Effort: Pulmonary effort is normal.      Breath sounds: Normal breath sounds.   Musculoskeletal:      Right lower leg: No edema.      Left lower leg: No edema.   Skin:     General: Skin is warm and dry.   Neurological:      General: No focal deficit present.      Mental Status: He is alert and oriented to person, place, and time.   Psychiatric:         Mood and Affect: Mood normal.         Behavior: Behavior normal.       Significant Labs:  BMP:   Recent Labs   Lab 02/07/23  0620   *      K 3.4*      CO2 22*   BUN 58*   CREATININE 2.7*   CALCIUM 8.6*   MG 1.9       CMP:   Recent Labs   Lab 02/07/23  0620   *   CALCIUM 8.6*   ALBUMIN 2.6*   PROT 6.6      K 3.4*   CO2 22*      BUN 58*   CREATININE 2.7*   ALKPHOS 60   ALT 31   AST 21   BILITOT  0.9       All labs within the past 24 hours have been reviewed.    Significant Imaging:  Labs: Reviewed      Assessment/Plan:     Active Diagnoses:    Diagnosis Date Noted POA    PRINCIPAL PROBLEM:  Acute renal failure [N17.9] 02/02/2023 Yes    ELENA (obstructive sleep apnea) [G47.33] 02/04/2023 Yes    Type 2 diabetes mellitus, without long-term current use of insulin [E11.9] 02/04/2023 Yes    Class 2 severe obesity due to excess calories with serious comorbidity and body mass index (BMI) of 36.0 to 36.9 in adult [E66.01, Z68.36] 02/04/2023 Not Applicable    Chronic a-fib [I48.20] 02/03/2023 Yes    Influenza A [J10.1] 02/02/2023 Yes    HTN (hypertension) [I10] 02/02/2023 Yes    Lung nodule [R91.1] 02/02/2023 Yes    Hyperlipidemia [E78.5] 02/02/2023 Yes    Transaminitis [R74.01] 02/02/2023 Yes    Pneumonia [J18.9] 02/01/2023 Yes      Problems Resolved During this Admission:       Assessment and plan:    1. TRENT:  Creatinine is continuing to improve down to 2.7 from 3.8 yesterday.  Is nonoliguric with 3.5 L urine output reported.      Renal ultrasound shows slightly large kidneys particularly on the left 13 cm.  At this point the cause of his acute kidney injury is not clear.  Several serologic tests are pending.     At this point the cause of his acute kidney injury is not entirely clear.  He will need close outpatient follow-up.    2. Metabolic acidosis:  Bicarbonate has improved to 22.    3. Electrolytes: Potassium slightly low at 3.4.  Replace per protocol.    4. He appears euvolemic on exam.    Approximately 50 minutes was spent in face-to-face conversation, coordination of care with other providers and chart review.      Thank you for your consult.     Hunter Byrd MD  Nephrology  [facility] [facility])

## 2023-02-07 NOTE — PLAN OF CARE
[facility] - [facility] (Hospital)  Discharge Final Note    Primary Care Provider: Andrey Vidales MD    Expected Discharge Date: 2/7/2023    Final Discharge Note (most recent)       Final Note - 02/07/23 0914          Final Note    Assessment Type Final Discharge Note     Anticipated Discharge Disposition Home or Self Care     Hospital Resources/Appts/Education Provided Appointments scheduled and added to AVS        Post-Acute Status    Discharge Delays None known at this time                   Pt to discharge home today. PCP scheduled.     No CM needs for discharge.     Important Message from Medicare             Contact Info       Andrey Vidales MD   Specialty: Family Medicine   Relationship: PCP - General    [address] [id number]   Phone: [phone]       Next Steps: Follow up on 2/13/2023    Instructions: at 10:15 AM  Need repeat BMP to assess renal function, and to be started on diabetic regimen    Dr. Jimenez, pulmonology        Next Steps: Follow up

## 2023-02-07 NOTE — CONSULTS
Diabetes Education  Author: Megan Leon, RN  Date: 2/7/2023      Consulted for diabetes education. A1C level 7.8 2/3/23 and  2/3/23. Patient states prior to this hospital admit he has never been told he has diabetes. Patient educated about diabetes, A1C level, BG normal levels, importance of maintaining normal levels, and follow up care. Demonstrated how to check blood sugar level with sample meter and supplies given to patient. Patient did have hands on with meter and testing strips but refused to return demonstration on the lancet pen. Patient states he was sorry but he didn't want to do it right now and that his father in law is a diabetic and if he needs help he will ask him. Teach back method performed. Spoke with hospital provider and she states no medications to be ordered at discharge and patient will follow up with PCP next week for labs to be redrawn.                Health Maintenance was reviewed today with patient. Discussed with patient importance of routine eye exams, foot exams/foot care, blood work (i.e.: A1c, microalbumin, and lipid), dental visits, yearly flu vaccine, and pneumonia vaccine as indicated by PCP. Patient verbalized understanding.     Health Maintenance Topics with due status: Not Due       Topic Last Completion Date    Low Dose Statin 11/15/2022    Hemoglobin A1c 02/03/2023     Health Maintenance Due   Topic Date Due    Hepatitis C Screening  Never done    Lipid Panel  Never done    COVID-19 Vaccine (1) Never done    Diabetes Urine Screening  Never done    Pneumococcal Vaccines (Age 0-64) (1 - PCV) Never done    Foot Exam  Never done    Eye Exam  Never done    HIV Screening  Never done    TETANUS VACCINE  Never done    Colorectal Cancer Screening  Never done    Influenza Vaccine (1) Never done            Today's Self-Management Care Plan was developed with the patient's input and is based on barriers identified during today's assessment.      The patient received a copy of  today's self-management plan and verbalized understanding of the care plan, goals, and all of today's instructions.      The patient was encouraged to communicate with his physician and care team regarding his condition(s) and treatment.  I provided the patient with my contact information today and encouraged him to contact me via phone as needed.

## 2023-02-07 NOTE — DISCHARGE SUMMARY
[facility]  Discharge Summary      Patient Name: Dereje Aguirre  MRN: [id number]  [facility]: [id number]  Patient Class: IP- Inpatient  Admission Date: 2/3/2023  Hospital Length of Stay: 4 days  Discharge Date and Time:  02/07/2023 9:01 AM  Attending Physician: Britt Li DO   Discharging Provider: Britt Li DO  Primary Care Provider: Andrey Vidales MD    Primary Care Team: Networked reference to record PCT     HPI:   Dereje Aguirre is a 62 y.o. male with a PMH  has a past medical history of High cholesterol and Hypertension. who presented as a transfer from outside facility for higher level of care and nephrology consultation.  Presenting history noted below from  and outside hospital as as follows:    Patient is a 62-year-old man with history of hypertension/hyperlipidemia who had presented to his PCP Andrey Vidales MD for issues of worsening shortness of breath and cough. I spoke with Dr. Vidales and pt had been started on Azithromax and Augmentin po and was worsening so he came back in to his office on 02/01 with worsening symtpoms, Dr. Vidales felt he failed outpt management and called for admission to hospital      Patient states that for the past couple of weeks he has been having difficulty with symptoms of shortness of breath and cough.  He had gone to see his primary care physician who had treated him for a pneumonia.    Despite being treated for the symptoms patient continued to have runny nose, sinus congestion and cough.  Initially the cough was considered dry but has become more productive in the past week.  Patient states that due to the persistent cough he developed pleuritic chest pain and upper abdominal pain that gets worse when coughing and taking deep breaths.  Patient states that he had previously not had issues of shortness of breath on exertion but that has since gotten worse to the point where patient has become short of breath on minimal exertion.   Denies any constipation but has been having difficulty with diarrhea, decreased appetite and decreased oral intake.  Patient states that he has been feeling bad a lot, belching and not feeling as well as he did before.    Hospital Course:     02/02/2023 patient admitted from PCP office on 02/01 with failure of outpatient treatment known influenza A positive worsening shortness of breath requiring inpatient admission he is on 3 L of oxygen at present satting 91%  Patient BUN creatinine were 64 and 4.9 on admit this a.m. there up to 72 and 5.3 glucose was 233 also has some mild elevation of his liver functions he is no known prior history of any renal insufficiency see chest also showed a left lower lobe nodule patient states he was seen by Dr. Jimenez pulmonologist in [place] for this in the past was told it was fungal was treated with medicine for 6 months and the lesion has been stable since patient reports shortness of breath this a.m. very easily fatigued denies chest pain at present    02/03/2023 patient admitted with flu found to have worsening renal function creatinine is up to 7.3 this a.m. I called the transfer center this morning we are working on finding somewhere that has a renal/nephrologist [facility] on diversion we did place a Lee this morning and she got about 800 cc out with placement patient says he really did not have the urge to urinate denies any history of prostate trouble clinically he feels better as far as his breathing and congestion today we will await word from the transfer center regarding transfer we will leave his acute renal failure is likely multifactorial and hope he will respond to continued care without having to go on dialysis but feel he needs an inpatient renal consult at this time due to worsening renal function this was explained to the patient    02/03/2023 hospital course patient was admitted from PCP found to have mild renal insufficiency we gave  him ivf with bolus and held nephrotoxic med for the however his renal function has not improved  I spoke to the transfer center this morning and he is going to be transferred to [place] facility [facility] there other issues include chronic AFib for which he is on Eliquis he has some hyperglycemia we have ordered him some sliding scale A1c was measured at 7.8 he was not on any meds for diabetes at home prior to admit we have continued his metoprolol he was on an ACE inhibitor prior to admit which has been held also holding his lipitor due to some elevated liver function    At time of bedside assessment, patient lying in bed in no acute distress upon entering room earlier tonight without any concerns or complaints.  Patient currently pain-free and denies endorsing any lightheadedness, dizziness, headache, visual changes, fever, chills, sweats, nausea, vomiting, chest pain, shortness a breath, abdominal pain, bowel/bladder incontinence, muscle weakness, arthralgias, or onset neurological deficits.  Patient does report endorsing clear productive cough and dry mouth but reported all other review of systems negative except as noted above.  Patient updated on treatment plan in regards to Nephrology consultation and continuation of IVFs, treatment of hyperglycemia, and TRENT and agree with treatment plan moving forward.    PCP: Andrey Vidales        * No surgery found *      Hospital Course:   Pt admitted as a transfer overnight on 02/03 from OSH for management of acute renal failure. Steroids discontinued on admission due to hyperglycemia. PTA antibiotics of Rocephin/Azithromycin continued. Pt started on IV sodium bicarb for metabolic acidosis in setting of renal failure. Nephrology consulted for evaluation. Renal ultrasound shows slightly large kidneys particularly on the left 13 cm.  At this point the cause of his acute kidney injury is not clear, possibly AIN in setting of azithro use versus intermittent partial  obstruction secondary to extensive Benadryl use causing symptoms of prostatism. Initially planned for renal biopsy, however this was subsequently canceled with patient's agreement after further discussions with him regarding risks versus benefits as his creatinine continued to improve.  Less likely to be vasculitis or autoimmune process as renal function improved without initiation of any immunosuppressive therapy.  Nephrology recommend removal of Lee catheter to make sure he can void appropriately.  Lee catheter removed on 02/07/2023, patient was able to void without difficulty.  On the day of discharge, patient's creatinine had decreased further to 2.7.  He was made follow-up appointment with his PCP, Dr. Vidales for Monday 02/13/2023.  Referral initiated for patient to establish with Nephrology in [facility].  Seen and examined with nephrology on the day of discharge.  Patient agreeable with discharge plan.  All questions answered to his satisfaction.  Stable for discharge home at this time.       Goals of Care Treatment Preferences:  Code Status: Full Code      Consults:   Consults (From admission, onward)          Status Ordering Provider     Inpatient consult to Diabetes educator  Once        Provider:  (Not yet assigned)    Acknowledged SYLVIA HSANE     Inpatient consult to Interventional Radiology  Once        Provider:  Alexsander Dailey MD    Completed HUNTER BYRD     Inpatient consult to Nephrology  Once        Provider:  Hunter Byrd MD    Completed JOHN FALK     IP consult to case management  Once        Provider:  (Not yet assigned)    Completed JEANETTE BARGER            * Acute renal failure  BUN 64, Cr 4.98 on 02/01 on admission to OSH, uptrended to 102 and 7.38 respectively despite IV fluids. Pt transferred to [facility] for renal evaluation. Prev baseline   Nephrology consulted- Dr. Byrd following- discussed with Dr. Byrd-- concern for pigment nephropathy as pt  has large occult blood but no RBC vs. AIN in setting of azithro use versus intermittent partial obstruction secondary to extensive Benadryl use causing symptoms of prostatism.  Less likely vasculitis type picture. No need for urgent HD at this time  Renal U/S shows no hydronephrosis, acute L medical renal disease, nonobstructing L renal calcification  Initially planned for renal biopsy on 02/06, however this was canceled as renal function continued to improve  Continue strict Is&Os    Renally dose meds; avoid nephrotoxics   Initially placed on sodium bicarb infusion for acidosis, acidosis significantly improved prior to discharge      Type 2 diabetes mellitus, without long-term current use of insulin  Patient's FSGs are uncontrolled due to hyperglycemia on current medication regimen.  Last A1c reviewed-   Lab Results   Component Value Date    HGBA1C 7.8 (H) 02/03/2023     Most recent fingerstick glucose reviewed-   No results for input(s): POCTGLUCOSE in the last 24 hours.  Current correctional scale  High   titrate as needed anti-hyperglycemic dose as follows-   Antihyperglycemics (From admission, onward)    None      New diagnosis of diabetes  Diabetic education provided inpatient, also referred to outpatient diabetic educator  -glucometer, strips and lancets provided prior to discharge  -steroids stopped with improvement in glucose control  -discussed with patient that he will need close follow-up with PCP for home diabetic regimen.  He stated agreement    ELENA (obstructive sleep apnea)  Continue CPAP qhs     Class 2 severe obesity due to excess calories with serious comorbidity and body mass index (BMI) of 36.0 to 36.9 in adult  Body mass index is 36.84 kg/m². Elevation likely secondary to increased calorie intake and sedentary lifestyle. Patient educated on morbidity and mortality in regards to elevated BMI and stressed importance of diet and exercise.   Plan:  -low fat/low calorie diet       Chronic  a-fib  Patient with Long standing persistent (>12 months) atrial fibrillation which is controlled currently with Beta Blocker. Patient is currently in sinus rhythm.[id number] Score: 1. Anticoagulation indicated. Anticoagulation done with Eliquis.  Continue Lopressor   Resume Eliquis        Hyperlipidemia  statin initially held due to elevated LFTs  Resume on discharge      HTN (hypertension)  remains normotensive   Continue Norvasc   ARB discontinued due to acute renal failure which is now resolving  Follow-up with PCP prior to resumption of ARB    Lung nodule  CT abd on admission at OSH showed 2.5-3 cm, round, noncalcified nodule is noted posteriorly within the left lung base and is suspicious for a neoplastic/metastatic process  Per chart review, pt had CT chest which showed lung nodule in 03/2021 and biopsy was recommended at the time  Patient to follow up with outpatient pulmonologist    Transaminitis  Resolved   May be in setting of viral URI. Abd imaging at OSH shows no liver or gallbladder abnormalities      Influenza A  Finished renally dosed 5 day course of Tamiflu    Pneumonia  Recevied rocephin + Azithro at OSH as CXR showed RLL infiltrate vs. Atelectasis  Azithromycin was discontinued  Received a total of 5 days of ceftriaxone  Weaned off supplemental O2    Final Active Diagnoses:    Diagnosis Date Noted POA    PRINCIPAL PROBLEM:  Acute renal failure [N17.9] 02/02/2023 Yes    ELENA (obstructive sleep apnea) [G47.33] 02/04/2023 Yes    Type 2 diabetes mellitus, without long-term current use of insulin [E11.9] 02/04/2023 Yes    Chronic a-fib [I48.20] 02/03/2023 Yes    Influenza A [J10.1] 02/02/2023 Yes    HTN (hypertension) [I10] 02/02/2023 Yes    Lung nodule [R91.1] 02/02/2023 Yes    Hyperlipidemia [E78.5] 02/02/2023 Yes    Transaminitis [R74.01] 02/02/2023 Yes    Pneumonia [J18.9] 02/01/2023 Yes      Problems Resolved During this Admission:       Discharged Condition: stable    Disposition: Home  or Self Care    Follow Up:   Follow-up Information       Andrey Vidales MD Follow up on 2/13/2023.    Specialty: Family Medicine  Why: at 10:15 AM  Need repeat BMP to assess renal function, and to be started on diabetic regimen  Contact information:  [place] [id number]  [phone]               Dr. Jimenez, pulmonology Follow up.                           Patient Instructions:      Ambulatory referral/consult to Nephrology   Standing Status: Future   Referral Priority: Routine Referral Type: Consultation   Referral Reason: Specialty Services Required   Requested Specialty: Nephrology   Number of Visits Requested: 1     Ambulatory referral/consult to Diabetes Education   Standing Status: Future   Referral Priority: Routine Referral Type: Consultation   Referral Reason: Specialty Services Required   Requested Specialty: Diabetes   Number of Visits Requested: 1 Expiration Date: 02/07/24     Diet diabetic     Diet renal     Notify your health care provider if you experience any of the following:  persistent nausea and vomiting or diarrhea     Notify your health care provider if you experience any of the following:  temperature >100.4     Notify your health care provider if you experience any of the following:  difficulty breathing or increased cough     Activity as tolerated       Significant Diagnostic Studies: Labs:   CMP   Recent Labs   Lab 02/06/23  0547 02/07/23  0620    144   K 3.4* 3.4*    109   CO2 20* 22*   * 152*   BUN 80* 58*   CREATININE 3.8* 2.7*   CALCIUM 8.6* 8.6*   PROT 6.4 6.6   ALBUMIN 2.6* 2.6*   BILITOT 0.7 0.9   ALKPHOS 65 60   AST 26 21   ALT 37 31   ANIONGAP 13 13   , CBC   Recent Labs   Lab 02/06/23  0548 02/07/23  0620   WBC 5.39 4.76   HGB 13.7* 13.2*   HCT 41.0 39.9*    207    and All labs within the past 24 hours have been reviewed    Pending Diagnostic Studies:       Procedure Component Value Units Date/Time    Anti-neutrophilic cytoplasmic  antibody [[id number]] Collected: 02/04/23 0653    Order Status: Sent Lab Status: In process Updated: 02/06/23 0731    Specimen: Blood     Myeloperoxidase Antibody (MPO) [[id number]] Collected: 02/04/23 0653    Order Status: Sent Lab Status: In process Updated: 02/04/23 1416    Specimen: Blood     Myoglobin, serum [[id number]] Collected: 02/04/23 0653    Order Status: Sent Lab Status: In process Updated: 02/04/23 1416    Specimen: Blood     Myoglobin, urine [[id number]] Collected: 02/04/23 0952    Order Status: Sent Lab Status: In process Updated: 02/04/23 2024    Specimen: Urine, Catheterized            Medications:  Reconciled Home Medications:      Medication List        START taking these medications      ONETOUCH DELICA PLUS LANCET 30 gauge Misc  Generic drug: lancets  use to check blood sugar three times a day     ONETOUCH VERIO REFLECT METER Misc  Generic drug: blood-glucose meter  use to check blood sugar three times a day     ONETOUCH VERIO TEST STRIPS Strp  Generic drug: blood sugar diagnostic  use to check blood sugar three times a day            CONTINUE taking these medications      amLODIPine 5 MG tablet  Commonly known as: NORVASC  Take 5 mg by mouth once daily.     apixaban 5 mg Tab  Commonly known as: ELIQUIS  Take 1 tablet (5 mg total) by mouth 2 (two) times daily.     ezetimibe 10 mg tablet  Commonly known as: ZETIA  TAKE ONE TABLET BY MOUTH ONCE A DAY FOR CHOLESTEROL     metoprolol tartrate 25 MG tablet  Commonly known as: LOPRESSOR  Take 1 tablet (25 mg total) by mouth 2 (two) times daily.     rosuvastatin 40 MG Tab  Commonly known as: CRESTOR  TAKE ONE TABLET BY MOUTH EVERY EVENING FOR CHOLESTEROL *GEN. CRESTOR*            STOP taking these medications      acetaminophen 500 MG tablet  Commonly known as: TYLENOL     albuterol-ipratropium 2.5 mg-0.5 mg/3 mL nebulizer solution  Commonly known as: DUO-NEB     benazepriL 40 MG tablet  Commonly known as: LOTENSIN     benzonatate 100 MG capsule  Commonly  known as: TESSALON     dextromethorphan-guaiFENesin  mg  mg per 12 hr tablet  Commonly known as: MUCINEX DM     dextrose 5 % (D5W) SolP 250 mL with azithromycin 500 mg SolR 500 mg     dextrose 5 % in water (D5W) 5 % PgBk 50 mL with cefTRIAXone 1 gram SolR 1 g     hydrocodone-chlorpheniramine 10-8 mg/5 mL suspension  Commonly known as: TUSSIONEX     ibuprofen 800 MG tablet  Commonly known as: ADVIL,MOTRIN     melatonin 3 mg tablet  Commonly known as: MELATIN     methylPREDNISolone sodium succinate 40 mg/mL Solr  Commonly known as: SOLU-MEDROL     ondansetron 4 mg/2 mL Soln     oseltamivir 6 mg/mL Susr  Commonly known as: TAMIFLU     sodium chloride 0.9% solution     tiZANidine 2 MG tablet  Commonly known as: ZANAFLEX              Indwelling Lines/Drains at time of discharge:   Lines/Drains/Airways       None                   Time spent on the discharge of patient: 45 minutes         Britt Li DO  Department of Hospital Medicine  [facility] - Telemetry ([facility])

## 2023-02-07 NOTE — PLAN OF CARE
POC reviewed with pt. Pt verbalizes understanding of POC. No questions at this time.  AAOx4.   NSR with some intermittent bradycardia on cardiac monitor.  Pt remains free of falls. Independent with ADLs.  Medications given as ordered.  POCT glucose monitored.   PRN pain medication given per request.  No complaints or distress noted @ this time.  Safety measures in place. Will continue to monitor.  Informed pt to call for assistance before getting up. Pt verbalizes understanding.   Hourly rounding complete.

## 2023-02-07 NOTE — PROGRESS NOTES
[facility] Medicine  Progress Note    Patient Name: Dereje Aguirre  MRN: [id number]  Patient Class: IP- Inpatient   Admission Date: 2/3/2023  Length of Stay: 3 days  Attending Physician: Britt Li DO  Primary Care Provider: Andrey Vidales MD        Subjective:     Principal Problem:Acute renal failure        HPI:  Dereje Aguirre is a 62 y.o. male with a PMH  has a past medical history of High cholesterol and Hypertension. who presented as a transfer from outside facility for higher level of care and nephrology consultation.  Presenting history noted below from [place] and outside hospital as as follows:    Patient is a 62-year-old man with history of hypertension/hyperlipidemia who had presented to his PCP Andrey Vidales MD for issues of worsening shortness of breath and cough. I spoke with Dr. Vidales and pt had been started on Azithromax and Augmentin po and was worsening so he came back in to his office on 02/01 with worsening symtpoms, Dr. Vidales felt he failed outpt management and called for admission to hospital      Patient states that for the past couple of weeks he has been having difficulty with symptoms of shortness of breath and cough.  He had gone to see his primary care physician who had treated him for a pneumonia.    Despite being treated for the symptoms patient continued to have runny nose, sinus congestion and cough.  Initially the cough was considered dry but has become more productive in the past week.  Patient states that due to the persistent cough he developed pleuritic chest pain and upper abdominal pain that gets worse when coughing and taking deep breaths.  Patient states that he had previously not had issues of shortness of breath on exertion but that has since gotten worse to the point where patient has become short of breath on minimal exertion.  Denies any constipation but has been having difficulty with diarrhea, decreased appetite and decreased oral intake.   Patient states that he has been feeling bad a lot, belching and not feeling as well as he did before.    Hospital Course:     02/02/2023 patient admitted from PCP office on 02/01 with failure of outpatient treatment known influenza A positive worsening shortness of breath requiring inpatient admission he is on 3 L of oxygen at present satting 91%  Patient BUN creatinine were 64 and 4.9 on admit this a.m. there up to 72 and 5.3 glucose was 233 also has some mild elevation of his liver functions he is no known prior history of any renal insufficiency see chest also showed a left lower lobe nodule patient states he was seen by Dr. Jimenez pulmonologist in [place] for this in the past was told it was fungal was treated with medicine for 6 months and the lesion has been stable since patient reports shortness of breath this a.m. very easily fatigued denies chest pain at present    02/03/2023 patient admitted with flu found to have worsening renal function creatinine is up to 7.3 this a.m. I called the transfer center this morning we are working on finding somewhere that has a renal/nephrologist [facility] on diversion we did place a Lee this morning and she got about 800 cc out with placement patient says he really did not have the urge to urinate denies any history of prostate trouble clinically he feels better as far as his breathing and congestion today we will await word from the transfer center regarding transfer we will leave his acute renal failure is likely multifactorial and hope he will respond to continued care without having to go on dialysis but feel he needs an inpatient renal consult at this time due to worsening renal function this was explained to the patient    02/03/2023 hospital course patient was admitted from PCP found to have mild renal insufficiency we gave him ivf with bolus and held nephrotoxic med for the however his renal function has not improved  I spoke to the  transfer center this morning and he is going to be transferred to [place] facility [facility] there other issues include chronic AFib for which he is on Eliquis he has some hyperglycemia we have ordered him some sliding scale A1c was measured at 7.8 he was not on any meds for diabetes at home prior to admit we have continued his metoprolol he was on an ACE inhibitor prior to admit which has been held also holding his lipitor due to some elevated liver function    At time of bedside assessment, patient lying in bed in no acute distress upon entering room earlier tonight without any concerns or complaints.  Patient currently pain-free and denies endorsing any lightheadedness, dizziness, headache, visual changes, fever, chills, sweats, nausea, vomiting, chest pain, shortness a breath, abdominal pain, bowel/bladder incontinence, muscle weakness, arthralgias, or onset neurological deficits.  Patient does report endorsing clear productive cough and dry mouth but reported all other review of systems negative except as noted above.  Patient updated on treatment plan in regards to Nephrology consultation and continuation of IVFs, treatment of hyperglycemia, and TRENT and agree with treatment plan moving forward.    PCP: Andrey Vidales        Overview/Hospital Course:  Pt admitted as a transfer overnight on 02/03 from OSH for management of acute renal failure. Steroids discontinued on admission due to hyperglycemia. PTA antibiotics of Rocephin/Azithromycin continued. Pt started on IV sodium bicarb for metabolic acidosis in setting of renal failure. Nephrology consulted for evaluation.  Initially planned for renal biopsy, however this was subsequently canceled with patient's agreement after further discussions with him regarding risks versus benefits as his creatinine continued to improve.  Less likely to be vasculitis or autoimmune process as renal function improved without initiation of any immunosuppressive therapy.   Nephrology recommend removal of Lee catheter to make sure he can void appropriately.  Planned for discharge home tomorrow with close follow-up with PCP and Nephrology      Interval History: No acute events overnight.  Seen and examined without any family present.  Cough is improving.  Denies any other complaints today.  Patient had agreed to renal biopsy being canceled after discussing with Nephrology.  Lee catheter to be removed, monitor for urinary retention post Lee removal.      Review of Systems   Constitutional:  Negative for chills and fever.   Respiratory:  Positive for cough. Negative for shortness of breath and wheezing.    Cardiovascular:  Negative for chest pain and palpitations.   Gastrointestinal:  Negative for abdominal pain, constipation, diarrhea, nausea and vomiting.   Neurological:  Negative for dizziness and headaches.   All other systems reviewed and are negative.  Objective:     Vital Signs (Most Recent):  Temp: 98 °F (36.7 °C) (02/06/23 1247)  Pulse: 76 (02/06/23 1500)  Resp: 18 (02/06/23 1500)  BP: (!) 112/57 (02/06/23 1247)  SpO2: (!) 94 % (02/06/23 1500)   Vital Signs (24h Range):  Temp:  [97.8 °F (36.6 °C)-98.1 °F (36.7 °C)] 98 °F (36.7 °C)  Pulse:  [65-85] 76  Resp:  [16-18] 18  SpO2:  [92 %-96 %] 94 %  BP: (107-124)/(55-77) 112/57     Weight: 119.8 kg (264 lb 1.8 oz)  Body mass index is 36.84 kg/m².    Intake/Output Summary (Last 24 hours) at 2/6/2023 1538  Last data filed at 2/6/2023 1423  Gross per 24 hour   Intake --   Output 4450 ml   Net -4450 ml      Physical Exam  Vitals and nursing note reviewed.   Constitutional:       General: He is not in acute distress.     Appearance: He is obese. He is not ill-appearing.   HENT:      Head: Normocephalic and atraumatic.      Right Ear: External ear normal.      Left Ear: External ear normal.      Nose: Nose normal.      Mouth/Throat:      Mouth: Mucous membranes are moist.   Eyes:      Extraocular Movements: Extraocular movements  intact.      Pupils: Pupils are equal, round, and reactive to light.   Cardiovascular:      Rate and Rhythm: Normal rate and regular rhythm.   Pulmonary:      Effort: Pulmonary effort is normal. No respiratory distress.      Breath sounds: No wheezing.   Abdominal:      General: Bowel sounds are normal.      Palpations: Abdomen is soft.      Tenderness: There is no abdominal tenderness. There is no guarding or rebound.   Genitourinary:     Comments: Lee catheter in place  Musculoskeletal:      Cervical back: Neck supple.      Right lower leg: No edema.      Left lower leg: No edema.   Skin:     General: Skin is warm and dry.   Neurological:      Mental Status: He is alert and oriented to person, place, and time. Mental status is at baseline.       Significant Labs: All pertinent labs within the past 24 hours have been reviewed.  CBC:   Recent Labs   Lab 02/05/23 0529 02/06/23  0548   WBC 8.48 5.39   HGB 13.3* 13.7*   HCT 38.7* 41.0    171     CMP:   Recent Labs   Lab 02/05/23 0529 02/06/23  0547    143   K 3.7 3.4*    110   CO2 18* 20*   * 169*   * 80*   CREATININE 5.6* 3.8*   CALCIUM 8.5* 8.6*   PROT 6.1 6.4   ALBUMIN 2.6* 2.6*   BILITOT 0.5 0.7   ALKPHOS 68 65   AST 42* 26   ALT 49* 37   ANIONGAP 15 13       Significant Imaging: I have reviewed all pertinent imaging results/findings within the past 24 hours.      Assessment/Plan:      * Acute renal failure  BUN 64, Cr 4.98 on 02/01 on admission to OSH, uptrended to 102 and 7.38 respectively despite IV fluids. Pt transferred to [facility] for renal evaluation. Prev baseline   Nephrology consulted- Dr. Byrd following- discussed with Dr. Byrd-- concern for pigment nephropathy as pt has large occult blood but no RBC vs. AIN in setting of azithro use versus intermittent partial obstruction secondary to extensive Benadryl use causing symptoms of prostatism.  Less likely vasculitis type picture. No need for urgent HD at this  time  Renal U/S shows no hydronephrosis, acute L medical renal disease, nonobstructing L renal calcification  Initially planned for renal biopsy on 02/06, however this was canceled as renal function continued to improve  Continue strict Is&Os   Renally dose meds; avoid nephrotoxics   Continue sodium bicarb infusion for acidosis       Type 2 diabetes mellitus, without long-term current use of insulin  Patient's FSGs are uncontrolled due to hyperglycemia on current medication regimen.  Last A1c reviewed-   Lab Results   Component Value Date    HGBA1C 7.8 (H) 02/03/2023     Most recent fingerstick glucose reviewed-   Recent Labs   Lab 02/05/23  2034 02/06/23  0030 02/06/23  0529 02/06/23  1048   POCTGLUCOSE 200* 187* 161* 165*     Current correctional scale  High  titrate as needed anti-hyperglycemic dose as follows-   Antihyperglycemics (From admission, onward)    Start     Stop Route Frequency Ordered    02/04/23 0916  insulin aspart U-100 pen 0-15 Units         -- SubQ Every 6 hours PRN 02/04/23 0817      Plan:  -SSI increased to high dose   -steroids stopped  - if BG remain elevated, will add long acting insulin but will need to start low dose due to ARF   -hypoglycemic protocol  -we will need close follow-up with PCP for home diabetic regimen    ELENA (obstructive sleep apnea)  Continue CPAP qhs     Class 2 severe obesity due to excess calories with serious comorbidity and body mass index (BMI) of 36.0 to 36.9 in adult  Body mass index is 36.84 kg/m². Elevation likely secondary to increased calorie intake and sedentary lifestyle. Patient educated on morbidity and mortality in regards to elevated BMI and stressed importance of diet and exercise.   Plan:  -low fat/low calorie diet       Chronic a-fib  Patient with Long standing persistent (>12 months) atrial fibrillation which is controlled currently with Beta Blocker. Patient is currently in sinus rhythm.[id number] Score: 1. Anticoagulation indicated. Anticoagulation  done with Eliquis.  Continue Lopressor   Resume Eliquis        Hyperlipidemia  statin initially held due to elevated LFTs  Resume on discharge      HTN (hypertension)  remains normotensive   Continue Norvasc   ARB held due to ARF   Monitor BP    Lung nodule  CT abd on admission at OSH showed 2.5-3 cm, round, noncalcified nodule is noted posteriorly within the left lung base and is suspicious for a neoplastic/metastatic process  Per chart review, pt had CT chest which showed lung nodule in 03/2021 and biopsy was recommended at the time  Will need to discuss with patient to follow up with outpatient pulmonologist    Transaminitis  Resolved   May be in setting of viral URI. Abd imaging at OSH shows no liver or gallbladder abnormalities      Influenza A  Continue Tamiflu-will decrease dose to 30 mg every other day based on renal function for total course of 5d   Continue supportive care, droplet precautions   Wean O2 as tolerated to maintain sats> 90%       Pneumonia  Recevied rocephin + Azithro at OSH as CXR showed RLL infiltrate vs. Atelectasis  Continue Rocephin (day 5)   Azithromycin was discontinued  Weaned off supplemental O2      VTE Risk Mitigation (From admission, onward)         Ordered     apixaban tablet 5 mg  2 times daily         02/06/23 1552     IP VTE HIGH RISK PATIENT  Once         02/04/23 0038     Place sequential compression device  Until discontinued         02/04/23 0038                Discharge Planning   BARBER:      Code Status: Full Code   Is the patient medically ready for discharge?:     Reason for patient still in hospital (select all that apply): Patient trending condition  Discharge Plan A: Home                  Britt Li DO  Department of Hospital Medicine   [facility] - Telemetry ([facility])

## 2023-02-08 LAB
ANCA AB TITR SER IF: NORMAL TITER
MYELOPEROXIDASE AB SER-ACNC: 3 UNITS
P-ANCA TITR SER IF: NORMAL TITER

## 2023-02-09 NOTE — ASSESSMENT & PLAN NOTE
BUN 64, Cr 4.98 on 02/01 on admission to OSH, uptrended to 102 and 7.38 respectively despite IV fluids. Pt transferred to [facility] for renal evaluation. Prev baseline   Nephrology consulted- Dr. Byrd following- discussed with Dr. Byrd-- concern for pigment nephropathy as pt has large occult blood but no RBC vs. AIN in setting of azithro use versus intermittent partial obstruction secondary to extensive Benadryl use causing symptoms of prostatism.  Less likely vasculitis type picture. No need for urgent HD at this time  Renal U/S shows no hydronephrosis, acute L medical renal disease, nonobstructing L renal calcification  Initially planned for renal biopsy on 02/06, however this was canceled as renal function continued to improve  Continue strict Is&Os    Renally dose meds; avoid nephrotoxics   Initially placed on sodium bicarb infusion for acidosis, acidosis significantly improved prior to discharge

## 2023-02-09 NOTE — ASSESSMENT & PLAN NOTE
Patient with Long standing persistent (>12 months) atrial fibrillation which is controlled currently with Beta Blocker. Patient is currently in sinus rhythm.[id number] Score: 1. Anticoagulation indicated. Anticoagulation done with Eliquis.  Continue Lopressor   Resume Eliquis

## 2023-02-09 NOTE — ASSESSMENT & PLAN NOTE
remains normotensive   Continue Norvasc   ARB discontinued due to acute renal failure which is now resolving  Follow-up with PCP prior to resumption of ARB

## 2023-02-09 NOTE — ASSESSMENT & PLAN NOTE
Recevied rocephin + Azithro at OSH as CXR showed RLL infiltrate vs. Atelectasis  Azithromycin was discontinued  Received a total of 5 days of ceftriaxone  Weaned off supplemental O2

## 2023-02-09 NOTE — ASSESSMENT & PLAN NOTE
Patient's FSGs are uncontrolled due to hyperglycemia on current medication regimen.  Last A1c reviewed-   Lab Results   Component Value Date    HGBA1C 7.8 (H) 02/03/2023     Most recent fingerstick glucose reviewed-   No results for input(s): POCTGLUCOSE in the last 24 hours.  Current correctional scale  High   titrate as needed anti-hyperglycemic dose as follows-   Antihyperglycemics (From admission, onward)    None      New diagnosis of diabetes  Diabetic education provided inpatient, also referred to outpatient diabetic educator  -glucometer, strips and lancets provided prior to discharge  -steroids stopped with improvement in glucose control  -discussed with patient that he will need close follow-up with PCP for home diabetic regimen.  He stated agreement

## 2023-02-09 NOTE — ASSESSMENT & PLAN NOTE
CT abd on admission at OSH showed 2.5-3 cm, round, noncalcified nodule is noted posteriorly within the left lung base and is suspicious for a neoplastic/metastatic process  Per chart review, pt had CT chest which showed lung nodule in 03/2021 and biopsy was recommended at the time  Patient to follow up with outpatient pulmonologist

## 2023-02-14 ENCOUNTER — LAB VISIT (OUTPATIENT)
Dept: LAB | Facility: HOSPITAL | Age: 63
End: 2023-02-14
Attending: FAMILY MEDICINE
Payer: MEDICAID

## 2023-02-14 DIAGNOSIS — I48.91 ATRIAL FIBRILLATION: ICD-10-CM

## 2023-02-14 DIAGNOSIS — E78.49 FAMILIAL COMBINED HYPERLIPIDEMIA: Primary | ICD-10-CM

## 2023-02-14 DIAGNOSIS — E11.00 TYPE II DIABETES MELLITUS WITH HYPEROSMOLARITY, UNCONTROLLED: ICD-10-CM

## 2023-02-14 LAB
ALBUMIN SERPL-MCNC: 3.8 G/DL (ref 3.4–5)
ALBUMIN/GLOB SERPL: 1.1 RATIO
ALP SERPL-CCNC: 93 UNIT/L (ref 50–144)
ALT SERPL-CCNC: 168 UNIT/L (ref 1–45)
ANION GAP SERPL CALC-SCNC: 12 MEQ/L (ref 2–13)
AST SERPL-CCNC: 147 UNIT/L (ref 17–59)
BASOPHILS # BLD AUTO: 0.02 X10(3)/MCL (ref 0.01–0.08)
BASOPHILS NFR BLD AUTO: 0.2 % (ref 0.1–1.2)
BILIRUBIN DIRECT+TOT PNL SERPL-MCNC: 1.4 MG/DL (ref 0–1)
BUN SERPL-MCNC: 33 MG/DL (ref 7–20)
CALCIUM SERPL-MCNC: 9.2 MG/DL (ref 8.4–10.2)
CHLORIDE SERPL-SCNC: 103 MMOL/L (ref 98–110)
CO2 SERPL-SCNC: 25 MMOL/L (ref 21–32)
CREAT SERPL-MCNC: 1.97 MG/DL (ref 0.66–1.25)
CREAT/UREA NIT SERPL: 17 (ref 12–20)
EOSINOPHIL # BLD AUTO: 0.13 X10(3)/MCL (ref 0.04–0.54)
EOSINOPHIL NFR BLD AUTO: 1.5 % (ref 0.7–7)
ERYTHROCYTE [DISTWIDTH] IN BLOOD BY AUTOMATED COUNT: 12.6 % (ref 11.6–14.4)
GFR SERPLBLD CREATININE-BSD FMLA CKD-EPI: 38 MLS/MIN/1.73/M2
GLOBULIN SER-MCNC: 3.4 GM/DL (ref 2–3.9)
GLUCOSE SERPL-MCNC: 136 MG/DL (ref 70–115)
HCT VFR BLD AUTO: 37.6 % (ref 36–52)
HGB BLD-MCNC: 12.1 GM/DL (ref 13–18)
IMM GRANULOCYTES # BLD AUTO: 0.04 X10(3)/MCL (ref 0–0.03)
IMM GRANULOCYTES NFR BLD AUTO: 0.5 % (ref 0–0.5)
LYMPHOCYTES # BLD AUTO: 0.93 X10(3)/MCL (ref 1.32–3.57)
LYMPHOCYTES NFR BLD AUTO: 10.6 % (ref 20–55)
MCH RBC QN AUTO: 28.1 PG (ref 27–34)
MCV RBC AUTO: 87.2 FL (ref 79–99)
MEAN CELL HEMOGLOBIN CONCENTRATION (OHS) G/DL: 32.2 G/DL (ref 31–37)
MONOCYTES # BLD AUTO: 1.09 X10(3)/MCL (ref 0.3–0.82)
MONOCYTES NFR BLD AUTO: 12.5 % (ref 4.7–12.5)
NEUTROPHILS # BLD AUTO: 6.54 X10(3)/MCL (ref 1.78–5.38)
NEUTROPHILS NFR BLD AUTO: 74.7 % (ref 37–73)
NRBC BLD AUTO-RTO: 0 % (ref 0–1)
PLATELET # BLD AUTO: 417 X10(3)/MCL (ref 140–371)
PMV BLD AUTO: 10.9 FL (ref 9.4–12.4)
POTASSIUM SERPL-SCNC: 4 MMOL/L (ref 3.5–5.1)
PROT SERPL-MCNC: 7.2 GM/DL (ref 6.3–8.2)
RBC # BLD AUTO: 4.31 X10(6)/MCL (ref 4–6)
SODIUM SERPL-SCNC: 140 MMOL/L (ref 135–145)
WBC # SPEC AUTO: 8.8 X10(3)/MCL (ref 4–11.5)

## 2023-02-14 PROCEDURE — 85025 COMPLETE CBC W/AUTO DIFF WBC: CPT

## 2023-02-14 PROCEDURE — 36415 COLL VENOUS BLD VENIPUNCTURE: CPT

## 2023-02-14 PROCEDURE — 80053 COMPREHEN METABOLIC PANEL: CPT

## 2023-03-03 ENCOUNTER — TELEPHONE (OUTPATIENT)
Dept: DIABETES | Facility: CLINIC | Age: 63
End: 2023-03-03
Payer: MEDICAID

## 2023-05-08 ENCOUNTER — HOSPITAL ENCOUNTER (OUTPATIENT)
Dept: RADIOLOGY | Facility: HOSPITAL | Age: 63
Discharge: HOME | End: 2023-05-08
Attending: PHYSICIAN ASSISTANT
Payer: MEDICAID

## 2023-05-08 ENCOUNTER — HOSPITAL ENCOUNTER (OUTPATIENT)
Dept: RADIOLOGY | Facility: HOSPITAL | Age: 63
Discharge: HOME | End: 2023-05-08
Attending: FAMILY MEDICINE
Payer: MEDICAID

## 2023-05-08 DIAGNOSIS — J11.08 INFLUENZA DUE TO UNIDENTIFIED INFLUENZA VIRUS WITH SPECIFIED PNEUMONIA: ICD-10-CM

## 2023-05-08 DIAGNOSIS — M25.569 KNEE PAIN, UNSPECIFIED CHRONICITY, UNSPECIFIED LATERALITY: ICD-10-CM

## 2023-05-08 PROBLEM — N17.9 ACUTE RENAL FAILURE: Status: RESOLVED | Noted: 2023-02-02 | Resolved: 2023-05-08

## 2023-05-08 PROBLEM — J18.9 PNEUMONIA: Status: RESOLVED | Noted: 2023-02-01 | Resolved: 2023-05-08

## 2023-05-08 PROCEDURE — 71046 X-RAY EXAM CHEST 2 VIEWS: CPT | Mod: TC

## 2023-05-08 PROCEDURE — 73564 X-RAY EXAM KNEE 4 OR MORE: CPT | Mod: TC,50

## 2023-06-16 ENCOUNTER — PATIENT MESSAGE (OUTPATIENT)
Dept: PODIATRY | Facility: CLINIC | Age: 63
End: 2023-06-16
Payer: MEDICAID

## 2024-01-18 ENCOUNTER — HOSPITAL ENCOUNTER (OUTPATIENT)
Dept: RADIOLOGY | Facility: HOSPITAL | Age: 64
Discharge: HOME | End: 2024-01-18
Attending: FAMILY MEDICINE
Payer: COMMERCIAL

## 2024-01-18 DIAGNOSIS — R94.5 NONSPECIFIC ABNORMAL RESULTS OF LIVER FUNCTION STUDY: ICD-10-CM

## 2024-01-18 PROCEDURE — 76705 ECHO EXAM OF ABDOMEN: CPT | Mod: TC

## 2025-06-11 NOTE — ASSESSMENT & PLAN NOTE
Body mass index is 36.84 kg/m². Elevation likely secondary to increased calorie intake and sedentary lifestyle. Patient educated on morbidity and mortality in regards to elevated BMI and stressed importance of diet and exercise.   Plan:  -low fat/low calorie diet      Hide Include Location In Plan Question?: No Detail Level: Generalized

## 2025-08-01 ENCOUNTER — HOSPITAL ENCOUNTER (OUTPATIENT)
Dept: RADIOLOGY | Facility: HOSPITAL | Age: 65
Discharge: HOME | End: 2025-08-01
Attending: FAMILY MEDICINE
Payer: MEDICARE

## 2025-08-01 DIAGNOSIS — M25.511 RIGHT SHOULDER PAIN: ICD-10-CM

## 2025-08-01 PROCEDURE — 73030 X-RAY EXAM OF SHOULDER: CPT | Mod: TC,RT
